# Patient Record
Sex: FEMALE | Race: WHITE | NOT HISPANIC OR LATINO | Employment: FULL TIME | URBAN - NONMETROPOLITAN AREA
[De-identification: names, ages, dates, MRNs, and addresses within clinical notes are randomized per-mention and may not be internally consistent; named-entity substitution may affect disease eponyms.]

---

## 2017-06-15 ENCOUNTER — TELEPHONE (OUTPATIENT)
Dept: FAMILY MEDICINE | Facility: OTHER | Age: 47
End: 2017-06-15

## 2017-06-15 DIAGNOSIS — F41.0 PANIC DISORDER WITHOUT AGORAPHOBIA: ICD-10-CM

## 2017-06-15 RX ORDER — PAROXETINE 10 MG/1
10 TABLET, FILM COATED ORAL DAILY
Qty: 30 TABLET | Refills: 0 | Status: SHIPPED | OUTPATIENT
Start: 2017-06-15 | End: 2017-07-14

## 2017-06-15 NOTE — TELEPHONE ENCOUNTER
Last office visit 7/15/15.  Last signed 12/5/16 #90, 1 R.  Future appointment 7/14/17.  PCP Hailey.  Please see notes below.  Medication pended for 30 days.  Thanks

## 2017-06-15 NOTE — TELEPHONE ENCOUNTER
Reason for call:  Medication    1. Medication Name? Paxil  2. Is this request for a refill? Yes  3. What Pharmacy do you use? [facility]  4. Have you contacted your pharmacy? Yes    5. If yes, when?  (Please note that the turn-around-time for prescriptions is 72 business hours; I am sending your request at this time. SEND TO  [facility]  )  Description: Patient was told she needed to be seen for a med review, but patient will be out of medication in 3 days. She would like if she could get med refilled until her appt on 07/14.  Was an appointment offered for this a call? Yes 07/14 first available  Preferred method for responding to this messageTelephone Call  If we cannot reach you directly, may we leave a detailed response at the number you provided? Yes  Can this message wait until your PCP/Provider returns if not available today? No

## 2017-06-22 DIAGNOSIS — Z12.31 VISIT FOR SCREENING MAMMOGRAM: Primary | ICD-10-CM

## 2017-07-14 ENCOUNTER — OFFICE VISIT (OUTPATIENT)
Dept: FAMILY MEDICINE | Facility: OTHER | Age: 47
End: 2017-07-14
Attending: FAMILY MEDICINE
Payer: COMMERCIAL

## 2017-07-14 VITALS
HEIGHT: 64 IN | WEIGHT: 113 LBS | SYSTOLIC BLOOD PRESSURE: 122 MMHG | BODY MASS INDEX: 19.29 KG/M2 | TEMPERATURE: 97.2 F | DIASTOLIC BLOOD PRESSURE: 66 MMHG | HEART RATE: 58 BPM

## 2017-07-14 DIAGNOSIS — Z91.018 NUT ALLERGY: Primary | ICD-10-CM

## 2017-07-14 DIAGNOSIS — F41.0 PANIC DISORDER WITHOUT AGORAPHOBIA: ICD-10-CM

## 2017-07-14 DIAGNOSIS — F34.1 DYSTHYMIA: ICD-10-CM

## 2017-07-14 PROCEDURE — 99213 OFFICE O/P EST LOW 20 MIN: CPT | Performed by: FAMILY MEDICINE

## 2017-07-14 RX ORDER — PAROXETINE 10 MG/1
10 TABLET, FILM COATED ORAL DAILY
Qty: 90 TABLET | Refills: 4 | Status: SHIPPED | OUTPATIENT
Start: 2017-07-14 | End: 2018-09-14

## 2017-07-14 RX ORDER — EPINEPHRINE 0.3 MG/.3ML
0.3 INJECTION SUBCUTANEOUS PRN
Qty: 0.6 ML | Refills: 3 | Status: SHIPPED | OUTPATIENT
Start: 2017-07-14 | End: 2019-06-24

## 2017-07-14 ASSESSMENT — ANXIETY QUESTIONNAIRES
1. FEELING NERVOUS, ANXIOUS, OR ON EDGE: NOT AT ALL
6. BECOMING EASILY ANNOYED OR IRRITABLE: NOT AT ALL
5. BEING SO RESTLESS THAT IT IS HARD TO SIT STILL: NOT AT ALL
7. FEELING AFRAID AS IF SOMETHING AWFUL MIGHT HAPPEN: NOT AT ALL
GAD7 TOTAL SCORE: 0
2. NOT BEING ABLE TO STOP OR CONTROL WORRYING: NOT AT ALL
4. TROUBLE RELAXING: NOT AT ALL
3. WORRYING TOO MUCH ABOUT DIFFERENT THINGS: NOT AT ALL

## 2017-07-14 ASSESSMENT — PAIN SCALES - GENERAL: PAINLEVEL: NO PAIN (0)

## 2017-07-14 NOTE — MR AVS SNAPSHOT
"              After Visit Summary   7/14/2017    Kacy Valdez    MRN: [id number]           Patient Information     Date Of Birth          1970        Visit Information        Provider Department      7/14/2017 8:45 AM Carlos Hart MD [facility]        Today's Diagnoses     Nut allergy    -  1    Panic disorder without agoraphobia        Dysthymia           Follow-ups after your visit        Your next 10 appointments already scheduled     Aug 23, 2017 10:30 AM CDT   (Arrive by 10:15 AM)   PHYSICAL with Ada Shirley MD   [facility] [place] ([facility] [place] )    [address]  [place] [address]   [phone]              Who to contact     If you have questions or need follow up information about today's clinic visit or your schedule please contact [facility] directly at [phone].  Normal or non-critical lab and imaging results will be communicated to you by MyChart, letter or phone within 4 business days after the clinic has received the results. If you do not hear from us within 7 days, please contact the clinic through MyChart or phone. If you have a critical or abnormal lab result, we will notify you by phone as soon as possible.  Submit refill requests through [employer] or call your pharmacy and they will forward the refill request to us. Please allow 3 business days for your refill to be completed.          Additional Information About Your Visit        MyChart Information     [employer] lets you send messages to your doctor, view your test results, renew your prescriptions, schedule appointments and more. To sign up, go to [email or web address] . Click on \"Log in\" on the left side of the screen, which will take you to the Welcome page. Then click on \"Sign up Now\" on the right side of the page.     You will be asked to enter the access code listed below, as well as some personal information. Please follow the directions to create your username " "and password.     Your access code is: [email or web address]  Expires: 10/12/2017  9:07 AM     Your access code will  in 90 days. If you need help or a new code, please call your [facility] or [phone].        Care EveryWhere ID     This is your Care EveryWhere ID. This could be used by other organizations to access your [place] medical records  [id number]        Your Vitals Were     Pulse Temperature Height BMI (Body Mass Index)          58 97.2  F (36.2  C) 5' 4\" (1.626 m) 19.4 kg/m2         Blood Pressure from Last 3 Encounters:   17 122/66   16 112/76   16 131/81    Weight from Last 3 Encounters:   17 113 lb (51.3 kg)   16 113 lb (51.3 kg)   09/29/15 114 lb (51.7 kg)              Today, you had the following     No orders found for display         Today's Medication Changes          These changes are accurate as of: 17  9:07 AM.  If you have any questions, ask your nurse or doctor.               These medicines have changed or have updated prescriptions.        Dose/Directions    EPINEPHrine 0.3 MG/0.3ML injection 2-pack   Commonly known as:  EPIPEN/ADRENACLICK/or ANY BX GENERIC EQUIV   This may have changed:    - medication strength  - how much to take  - how to take this  - when to take this  - reasons to take this   Used for:  Nut allergy   Changed by:  Carlos Hart MD        Dose:  0.3 mg   Inject 0.3 mLs (0.3 mg) into the muscle as needed for anaphylaxis   Quantity:  0.6 mL   Refills:  3            Where to get your medicines      These medications were sent to [id number] [address] [address]  [address] [address]     Phone:  [phone]     EPINEPHrine 0.3 MG/0.3ML injection 2-pack    PARoxetine 10 MG tablet                Primary Care Provider Office Phone # Fax #    Carlos Hart [phone]       [facility] [address] [facility]  [place] [address]        Equal Access to Services     RISHI BUTLER AH: " Hadii aad ku hadidaniao Solennieali, waaxda luqadaha, qaybta kaalmada kristen, marita roberson. So [facility] [phone].    ATENCIÓN: Si millie paredes, tiene a caceres disposición servicios gratuitos de asistencia lingüística. Llame al [phone].    We comply with applicable federal civil rights laws and [place] laws. We do not discriminate on the basis of race, color, national origin, age, disability sex, sexual orientation or gender identity.            Thank you!     Thank you for choosing [facility]  for your care. Our goal is always to provide you with excellent care. Hearing back from our patients is one way we can continue to improve our services. Please take a few minutes to complete the written survey that you may receive in the mail after your visit with us. Thank you!             Your Updated Medication List - Protect others around you: Learn how to safely use, store and throw away your medicines at www.disposemymeds.org.          This list is accurate as of: 7/14/17  9:07 AM.  Always use your most recent med list.                   Brand Name Dispense Instructions for use Diagnosis    EPINEPHrine 0.3 MG/0.3ML injection 2-pack    EPIPEN/ADRENACLICK/or ANY BX GENERIC EQUIV    0.6 mL    Inject 0.3 mLs (0.3 mg) into the muscle as needed for anaphylaxis    Nut allergy       MIRENA (52 MG) 20 MCG/24HR IUD   Generic drug:  levonorgestrel      1 each by Intrauterine route once. Every 5 years        nystatin-triamcinolone cream    MYCOLOG II    100 g    Apply small amt to area nightly    Vaginitis and vulvovaginitis, unspecified, Routine gynecological examination       PARoxetine 10 MG tablet    PAXIL    90 tablet    Take 1 tablet (10 mg) by mouth daily    Panic disorder without agoraphobia

## 2017-07-14 NOTE — PROGRESS NOTES
"  SUBJECTIVE:                                                    Kacy Valdez is a 47 year old female who presents to clinic today for the following health issues:      Depression Followup    Status since last visit: Stable     See PHQ-9 for current symptoms.  Other associated symptoms: None    Complicating factors:   Significant life event:  No   Current substance abuse:  None  Anxiety or Panic symptoms:  No    PHQ-9  English  PHQ-9   Any Language    Amount of exercise or physical activity: 6-7 days/week for an average of greater than 60 minutes    Problems taking medications regularly: No    Medication side effects: none    Diet: regular (no restrictions)    PHQ-9 SCORE 6/3/2015 8/3/2016 7/14/2017   Total Score 1 - -   Total Score - 0 0     REGINA-7 SCORE 8/3/2016 7/14/2017   Total Score 0 0           Problem list and histories reviewed & adjusted, as indicated.  Additional history: as documented    Labs reviewed in EPIC    Reviewed and updated as needed this visit by clinical staff  Tobacco  Allergies  Meds  Med Hx  Surg Hx  Fam Hx  Soc Hx      Reviewed and updated as needed this visit by Provider         ROS:  C: NEGATIVE for fever, chills, change in weight  R: NEGATIVE for significant cough or SOB  CV: NEGATIVE for chest pain, palpitations or peripheral edema    OBJECTIVE:                                                    /66  Pulse 58  Temp 97.2  F (36.2  C)  Ht 5' 4\" (1.626 m)  Wt 113 lb (51.3 kg)  BMI 19.4 kg/m2  Body mass index is 19.4 kg/(m^2).   GENERAL: healthy, alert, well nourished, well hydrated, no distress  NECK: no tenderness, no adenopathy, no asymmetry, no masses, no stiffness; thyroid- normal to palpation  RESP: lungs clear to auscultation - no rales, no rhonchi, no wheezes  CV: regular rates and rhythm, normal S1 S2, no S3 or S4 and no murmur, no click or rub -  PSYCH: Alert and oriented times 3; speech- coherent , normal rate and volume; able to articulate logical thoughts, able " to abstract reason, no tangential thoughts, no hallucinations or delusions, affect- normal         ASSESSMENT/PLAN:                                                    1. Panic disorder without agoraphobia  VERY stable - need probable low dose life time paxil since works very well for her. Will ref and see back in 12-15 months.   - PARoxetine (PAXIL) 10 MG tablet; Take 1 tablet (10 mg) by mouth daily  Dispense: 90 tablet; Refill: 3    Needs RF on epipen for nut allergy- done today       See Patient Instructions    Carlos Hart MD  [facility]

## 2017-07-14 NOTE — NURSING NOTE
"Chief Complaint   Patient presents with     Depression       Initial /66  Pulse 58  Temp 97.2  F (36.2  C)  Ht 5' 4\" (1.626 m)  Wt 113 lb (51.3 kg)  BMI 19.4 kg/m2 Estimated body mass index is 19.4 kg/(m^2) as calculated from the following:    Height as of this encounter: 5' 4\" (1.626 m).    Weight as of this encounter: 113 lb (51.3 kg).  Medication Reconciliation: complete     Shaw Fu      "

## 2017-07-15 ASSESSMENT — PATIENT HEALTH QUESTIONNAIRE - PHQ9: SUM OF ALL RESPONSES TO PHQ QUESTIONS 1-9: 0

## 2017-07-15 ASSESSMENT — ANXIETY QUESTIONNAIRES: GAD7 TOTAL SCORE: 0

## 2017-08-23 ENCOUNTER — OFFICE VISIT (OUTPATIENT)
Dept: OBGYN | Facility: OTHER | Age: 47
End: 2017-08-23
Attending: OBSTETRICS & GYNECOLOGY
Payer: COMMERCIAL

## 2017-08-23 VITALS
BODY MASS INDEX: 19.29 KG/M2 | HEART RATE: 60 BPM | WEIGHT: 113 LBS | DIASTOLIC BLOOD PRESSURE: 64 MMHG | SYSTOLIC BLOOD PRESSURE: 112 MMHG | HEIGHT: 64 IN

## 2017-08-23 DIAGNOSIS — Z30.09 FAMILY PLANNING: ICD-10-CM

## 2017-08-23 DIAGNOSIS — Z12.31 VISIT FOR SCREENING MAMMOGRAM: Primary | ICD-10-CM

## 2017-08-23 DIAGNOSIS — Z00.00 ROUTINE GENERAL MEDICAL EXAMINATION AT A HEALTH CARE FACILITY: Primary | ICD-10-CM

## 2017-08-23 PROBLEM — N88.2 STENOSIS, CERVIX: Status: ACTIVE | Noted: 2017-08-23

## 2017-08-23 PROCEDURE — 87624 HPV HI-RISK TYP POOLED RSLT: CPT | Mod: 90 | Performed by: OBSTETRICS & GYNECOLOGY

## 2017-08-23 PROCEDURE — 99000 SPECIMEN HANDLING OFFICE-LAB: CPT | Performed by: OBSTETRICS & GYNECOLOGY

## 2017-08-23 PROCEDURE — 88142 CYTOPATH C/V THIN LAYER: CPT | Performed by: OBSTETRICS & GYNECOLOGY

## 2017-08-23 PROCEDURE — 99396 PREV VISIT EST AGE 40-64: CPT | Mod: 25 | Performed by: OBSTETRICS & GYNECOLOGY

## 2017-08-23 PROCEDURE — 77063 BREAST TOMOSYNTHESIS BI: CPT | Mod: TC | Performed by: RADIOLOGY

## 2017-08-23 PROCEDURE — G0202 SCR MAMMO BI INCL CAD: HCPCS | Mod: TC | Performed by: RADIOLOGY

## 2017-08-23 PROCEDURE — 58300 INSERT INTRAUTERINE DEVICE: CPT | Performed by: OBSTETRICS & GYNECOLOGY

## 2017-08-23 PROCEDURE — 76830 TRANSVAGINAL US NON-OB: CPT | Performed by: OBSTETRICS & GYNECOLOGY

## 2017-08-23 ASSESSMENT — ANXIETY QUESTIONNAIRES
7. FEELING AFRAID AS IF SOMETHING AWFUL MIGHT HAPPEN: NOT AT ALL
GAD7 TOTAL SCORE: 0
IF YOU CHECKED OFF ANY PROBLEMS ON THIS QUESTIONNAIRE, HOW DIFFICULT HAVE THESE PROBLEMS MADE IT FOR YOU TO DO YOUR WORK, TAKE CARE OF THINGS AT HOME, OR GET ALONG WITH OTHER PEOPLE: NOT DIFFICULT AT ALL
6. BECOMING EASILY ANNOYED OR IRRITABLE: NOT AT ALL
5. BEING SO RESTLESS THAT IT IS HARD TO SIT STILL: NOT AT ALL
1. FEELING NERVOUS, ANXIOUS, OR ON EDGE: NOT AT ALL
3. WORRYING TOO MUCH ABOUT DIFFERENT THINGS: NOT AT ALL
2. NOT BEING ABLE TO STOP OR CONTROL WORRYING: NOT AT ALL

## 2017-08-23 ASSESSMENT — PATIENT HEALTH QUESTIONNAIRE - PHQ9
5. POOR APPETITE OR OVEREATING: NOT AT ALL
SUM OF ALL RESPONSES TO PHQ QUESTIONS 1-9: 0

## 2017-08-23 NOTE — NURSING NOTE
"Chief Complaint   Patient presents with     Gyn Exam       Initial /64  Pulse 60  Ht 5' 4\" (1.626 m)  Wt 113 lb (51.3 kg)  BMI 19.4 kg/m2 Estimated body mass index is 19.4 kg/(m^2) as calculated from the following:    Height as of this encounter: 5' 4\" (1.626 m).    Weight as of this encounter: 113 lb (51.3 kg).  Medication Reconciliation: complete     WILLY YIP      "

## 2017-08-23 NOTE — LETTER
August 31, 2017      Kacy METCALF Courtney  [address]   [place] [address]        Dear Kacy,       Thank you for coming to the [facility]. This letter is to inform you that your pap test was normal and your High Risk HPV test was negative. Please call the nurse at [phone] if you have questions pertaining to your results.          Sincerely,        Ada Shirley MD/Adia VALLE LPN

## 2017-08-23 NOTE — MR AVS SNAPSHOT
"              After Visit Summary   8/23/2017    Kacy Valdez    MRN: [id number]           Patient Information     Date Of Birth          1970        Visit Information        Provider Department      8/23/2017 10:30 AM Ada Shirley MD [facility] [place]        Today's Diagnoses     Routine general medical examination at a health care facility    -  1    Family planning          Care Instructions    Return in 3 weeks   Return in 1 year           Follow-ups after your visit        Your next 10 appointments already scheduled     Aug 23, 2017 10:30 AM CDT   (Arrive by 10:15 AM)   PHYSICAL with Ada Shirley MD   [facility] ([facility] )    [address] [place] [address]   [phone]              Who to contact     If you have questions or need follow up information about today's clinic visit or your schedule please contact [facility] directly at [phone].  Normal or non-critical lab and imaging results will be communicated to you by MyChart, letter or phone within 4 business days after the clinic has received the results. If you do not hear from us within 7 days, please contact the clinic through MyChart or phone. If you have a critical or abnormal lab result, we will notify you by phone as soon as possible.  Submit refill requests through [employer] or call your pharmacy and they will forward the refill request to us. Please allow 3 business days for your refill to be completed.          Additional Information About Your Visit        MyChart Information     [employer] lets you send messages to your doctor, view your test results, renew your prescriptions, schedule appointments and more. To sign up, go to [email or web address] . Click on \"Log in\" on the left side of the screen, which will take you to the Welcome page. Then click on \"Sign up Now\" on the right side of the page.     You will be asked to enter the access code listed below, as well " "as some personal information. Please follow the directions to create your username and password.     Your access code is: [email or web address]  Expires: 10/12/2017  9:07 AM     Your access code will  in 90 days. If you need help or a new code, please call your [place] clinic or [phone].        Care EveryWhere ID     This is your Care EveryWhere ID. This could be used by other organizations to access your [place] medical records  [id number]        Your Vitals Were     Pulse Height BMI (Body Mass Index)             60 5' 4\" (1.626 m) 19.4 kg/m2          Blood Pressure from Last 3 Encounters:   17 112/64   17 122/66   16 112/76    Weight from Last 3 Encounters:   17 113 lb (51.3 kg)   17 113 lb (51.3 kg)   16 113 lb (51.3 kg)              We Performed the Following     INSERTION INTRAUTERINE DEVICE     REMOVE INTRAUTERINE DEVICE          Today's Medication Changes          These changes are accurate as of: 17 10:28 AM.  If you have any questions, ask your nurse or doctor.               These medicines have changed or have updated prescriptions.        Dose/Directions    * MIRENA (52 MG) 20 MCG/24HR IUD   This may have changed:  Another medication with the same name was added. Make sure you understand how and when to take each.   Generic drug:  levonorgestrel   Changed by:  Ada Shirley MD        Dose:  1 each   1 each by Intrauterine route once. Every 5 years   Refills:  0       * levonorgestrel 20 MCG/24HR IUD   Commonly known as:  MIRENA   This may have changed:  You were already taking a medication with the same name, and this prescription was added. Make sure you understand how and when to take each.   Used for:  Family planning   Changed by:  Ada Shirley MD        Dose:  1 each   1 each (20 mcg) by Intrauterine route continuous   Refills:  0       * Notice:  This list has 2 medication(s) that are the same as other medications prescribed for you. Read the " directions carefully, and ask your doctor or other care provider to review them with you.         Where to get your medicines      Some of these will need a paper prescription and others can be bought over the counter.  Ask your nurse if you have questions.     You don't need a prescription for these medications     levonorgestrel 20 MCG/24HR IUD                Primary Care Provider Office Phone # Fax #    Carlos Hart [phone]       [facility] [address] [facility] [address]        Equal Access to Services     [facility] : Hadii aad ku hadasho Soomaali, waaxda luqadaha, qaybta kaalmada adeegyada, waxay idiin hayaan steve haynes . So [facility] [phone].    ATENCIÓN: Si habla español, tiene a caceres disposición servicios gratuitos de asistencia lingüística. [facility] [phone].    We comply with applicable federal civil rights laws and [place] laws. We do not discriminate on the basis of race, color, national origin, age, disability sex, sexual orientation or gender identity.            Thank you!     Thank you for choosing [facility]  for your care. Our goal is always to provide you with excellent care. Hearing back from our patients is one way we can continue to improve our services. Please take a few minutes to complete the written survey that you may receive in the mail after your visit with us. Thank you!             Your Updated Medication List - Protect others around you: Learn how to safely use, store and throw away your medicines at www.disposemymeds.org.          This list is accurate as of: 8/23/17 10:28 AM.  Always use your most recent med list.                   Brand Name Dispense Instructions for use Diagnosis    EPINEPHrine 0.3 MG/0.3ML injection 2-pack    EPIPEN/ADRENACLICK/or ANY BX GENERIC EQUIV    0.6 mL    Inject 0.3 mLs (0.3 mg) into the muscle as needed for anaphylaxis    Nut allergy       * MIRENA (52 MG) 20 MCG/24HR IUD   Generic  drug:  levonorgestrel      1 each by Intrauterine route once. Every 5 years        * levonorgestrel 20 MCG/24HR IUD    MIRENA     1 each (20 mcg) by Intrauterine route continuous    Family planning       nystatin-triamcinolone cream    MYCOLOG II    100 g    Apply small amt to area nightly    Vaginitis and vulvovaginitis, unspecified, Routine gynecological examination       PARoxetine 10 MG tablet    PAXIL    90 tablet    Take 1 tablet (10 mg) by mouth daily    Panic disorder without agoraphobia       * Notice:  This list has 2 medication(s) that are the same as other medications prescribed for you. Read the directions carefully, and ask your doctor or other care provider to review them with you.

## 2017-08-23 NOTE — PROGRESS NOTES
ANNUAL    Kacy is a 47 year old  female who presents for annual exam.   yc 12  LC 9.5  Gdm n  hpt n  No LMP recorded. Patient is not currently having periods (Reason: IUD).  Menses: n pain n Contraception Mirena.  Planning pregnancy: n  Concerns in addition to routine health maintenance?  n.  GYNECOLOGIC HISTORY:   Surgery: n  History sexually transmitted infections:No STD history HPV  Safe?  y  STI testing offered?  y  History of abnormal Pap smear: y  Problems with sex? n  Family history of: breast CA: pgm and s   Uterine n ovarian CA: n   colon CA: n    HEALTH MAINTENANCE:  Cholesterol: (  Cholesterol   Date Value Ref Range Status   2015 153 <200 mg/dL Final     Comment:     LDL Cholesterol is the primary guide to therapy.   The NCEP recommends further evaluation of: patients with cholesterol greater   than 200 mg/dL if additional risk factors are present, cholesterol greater   than   240 mg/dL, triglycerides greater than 150 mg/dL, or HDL less than 40 mg/dL.     2014 147 <200 mg/dL Final     Comment:     LDL Cholesterol is the primary guide to therapy.   The NCEP recommends further evaluation of: patients with cholesterol greater   than 200 mg/dL if additional risk factors are present, cholesterol greater   than   240 mg/dL, triglycerides greater than 150 mg/dL, or HDL less than 40 mg/dL.      History of abnormal lipids:   Mammo: done . History of abnormal Mammo:y Provatas follows  Regular Self Breast Exams: y Calcium/Vitamin D or Vit: y Exercise y    TSH: (  TSH   Date Value Ref Range Status   2015 2.90 0.40 - 4.00 mU/L Final    )  Pap; (  Lab Results   Component Value Date    PAP NIL 2016    PAP NIL 2015    PAP OTHER-NIL, See Result 2014    )    HISTORY:  Obstetric History     No data available        Past Medical History:   Diagnosis Date     Carcinoma in situ of cervix uteri 2009     Dysthymic disorder 2001     Other acne 1/10/2002     Panic disorder without  agoraphobia 1/3/2011     Papanicolaou smear of cervix with high grade squamous intraepithelial lesion (HGSIL) 4/27/2009     Unspecified sinusitis (chronic) 6/5/2003     Past Surgical History:   Procedure Laterality Date     ------------OTHER-------------  2009    LEEP     ------------OTHER-------------  2005    IUD     BIOPSY BREAST      breast lump     BUNIONECTOMY  2010     Family History   Problem Relation Age of Onset     CANCER Sister 48     breast     Breast Cancer Paternal Grandmother 71     Social History     Social History     Marital status:      Spouse name: N/A     Number of children: N/A     Years of education: N/A     Social History Main Topics     Smoking status: Never Smoker     Smokeless tobacco: Never Used     Alcohol use No     Drug use: No     Sexual activity: Yes     Partners: Male     Birth control/ protection: IUD     Other Topics Concern     Parent/Sibling W/ Cabg, Mi Or Angioplasty Before 65f 55m? No     Social History Narrative       Current Outpatient Prescriptions:      EPINEPHrine (EPIPEN/ADRENACLICK/OR ANY BX GENERIC EQUIV) 0.3 MG/0.3ML injection 2-pack, Inject 0.3 mLs (0.3 mg) into the muscle as needed for anaphylaxis, Disp: 0.6 mL, Rfl: 3     nystatin-triamcinolone (MYCOLOG II) cream, Apply small amt to area nightly, Disp: 100 g, Rfl: 6     levonorgestrel (MIRENA) 20 MCG/24HR IUD, 1 each by Intrauterine route once. Every 5 years, Disp: , Rfl:      PARoxetine (PAXIL) 10 MG tablet, Take 1 tablet (10 mg) by mouth daily, Disp: 90 tablet, Rfl: 4     Allergies   Allergen Reactions     Nuts Other (See Comments)     Nut flavor - throat tightens       Past medical, surgical, social and family history were reviewed and updated in EPIC.    ROS:    C:     NEGATIVE for fever, chills, change in weight  I:       NEGATIVE for worrisome rashes, moles or lesions  E:     NEGATIVE for vision changes or irritation  E/M: NEGATIVE for ear, mouth and throat problems  R:     NEGATIVE for significant  "cough or SOB  CV:   NEGATIVE for chest pain, palpitations or peripheral edema  GI:     NEGATIVE for nausea, abdominal pain, heartburn, or change in bowel habits  :   NEGATIVE for frequency, dysuria, hematuria, vaginal discharge, or irregular bleeding,incontinence   M:     NEGATIVE for significant arthralgias or myalgia  N:      NEGATIVE for weakness, dizziness or paresthesias  E:      NEGATIVE for temperature intolerance, skin/hair changes  P:      NEGATIVE for changes in mood or affect.     EXAM:   /64  Pulse 60  Ht 5' 4\" (1.626 m)  Wt 113 lb (51.3 kg)  BMI 19.4 kg/m2   BMI: Body mass index is 19.4 kg/(m^2).  Constitutional: healthy, alert and no distress  Head: Normocephalic. No masses, lesions, tenderness or abnormalities  Neck: Neck supple. Trachea midline. No adenopathy. Thyroid symmetric, normal size.   Cardiovascular: RRR.   Respiratory: lungs clear   Breast: Breasts reveal mild symmetric fibrocystic densities, but there are no dominant, discrete, fixed or suspicious masses found.  Gastrointestinal: Abdomen soft, non-tender, non-distended. No masses, organomegaly.  :  Vulva:  No external lesions, normal female hair distribution, no inguinal adenopathy.    Urethra:  Midline, non-tender, well supported, no discharge  Vagina:  Moist, pink, no abnormal discharge, no lesions  Cervix: clean string in place  Procedure:  After verbal consent was obtained from the patient, IUD strings were grasped with ring forcep and IUD easily removed intact with minimal patient discomfort noted.  No bleeding noted.  Mirena placed in usual manner without comps but necessitating cervical dilation  Uterus:   Normal size,  , non-tender, freely mobile  Ovaries:  No masses appreciated  Rectal Exam: not done  Musculoskeletal: extremities normal  Skin: no suspicious lesions or rashes  Psychiatric: Affect appropriate, cooperative,mentation appears normal.     COUNSELING:   regular exercise  healthy " diet/nutrition  Immunizations   contraception  family planning  Folic Acid Counseling     reports that she has never smoked. She has never used smokeless tobacco.  Tobacco Cessation Action Plan: na  Body mass index is 19.4 kg/(m^2).  Weight management plan: na  FRAX Risk Assessment    ASSESSMENT:  47 year old female with satisfactory annual exam  Family planning,stenotic ceervix  PLAN:   Pap with HR hpv with cytobrush  Mirena removed and replaced in through stenotic cx  TVusg show IUD in place  rto 3 wks  Tsh,fasting chol with lipid profile,fasting blood sugar,hp,hga1c,next year  mammogram done  Needs MRI next year or whenever Provatas says  CheckMIIC  rto 1 year     Greater than 0  minutes spent discussing other than annual     Ada Shirley MD  Addendum 1639  Call to patient by me reveals light bleeding. Cramping is gone with Ibuprofen

## 2017-08-24 ASSESSMENT — ANXIETY QUESTIONNAIRES: GAD7 TOTAL SCORE: 0

## 2017-08-30 LAB
COPATH REPORT: NORMAL
PAP: NORMAL

## 2017-08-31 LAB
FINAL DIAGNOSIS: NORMAL
HPV HR 12 DNA CVX QL NAA+PROBE: NEGATIVE
HPV16 DNA SPEC QL NAA+PROBE: NEGATIVE
HPV18 DNA SPEC QL NAA+PROBE: NEGATIVE
SPECIMEN DESCRIPTION: NORMAL

## 2017-09-13 ENCOUNTER — OFFICE VISIT (OUTPATIENT)
Dept: OBGYN | Facility: OTHER | Age: 47
End: 2017-09-13
Attending: OBSTETRICS & GYNECOLOGY
Payer: COMMERCIAL

## 2017-09-13 VITALS
BODY MASS INDEX: 19.29 KG/M2 | DIASTOLIC BLOOD PRESSURE: 70 MMHG | HEIGHT: 64 IN | SYSTOLIC BLOOD PRESSURE: 112 MMHG | HEART RATE: 60 BPM | WEIGHT: 113 LBS

## 2017-09-13 DIAGNOSIS — Z30.09 FAMILY PLANNING: Primary | ICD-10-CM

## 2017-09-13 PROCEDURE — 99212 OFFICE O/P EST SF 10 MIN: CPT | Performed by: OBSTETRICS & GYNECOLOGY

## 2017-09-13 NOTE — NURSING NOTE
"Chief Complaint   Patient presents with     IUD       Initial /70  Pulse 60  Ht 5' 4\" (1.626 m)  Wt 113 lb (51.3 kg)  BMI 19.4 kg/m2 Estimated body mass index is 19.4 kg/(m^2) as calculated from the following:    Height as of this encounter: 5' 4\" (1.626 m).    Weight as of this encounter: 113 lb (51.3 kg).  Medication Reconciliation: sg Cancino      "

## 2017-09-13 NOTE — MR AVS SNAPSHOT
"              After Visit Summary   2017    Kacy Valdez    MRN: [id number]           Patient Information     Date Of Birth          1970        Visit Information        Provider Department      2017 8:30 AM Ada Shirley MD [facility]        Today's Diagnoses     Family planning    -  1      Care Instructions    Return for annual exam.            Follow-ups after your visit        Who to contact     If you have questions or need follow up information about today's clinic visit or your schedule please contact [facility] directly at [phone].  Normal or non-critical lab and imaging results will be communicated to you by Yolah[employer], letter or phone within 4 business days after the clinic has received the results. If you do not hear from us within 7 days, please contact the clinic through Yolahart or phone. If you have a critical or abnormal lab result, we will notify you by phone as soon as possible.  Submit refill requests through [employer] or call your pharmacy and they will forward the refill request to us. Please allow 3 business days for your refill to be completed.          Additional Information About Your Visit        MyChart Information     [employer] lets you send messages to your doctor, view your test results, renew your prescriptions, schedule appointments and more. To sign up, go to [email or web address] . Click on \"Log in\" on the left side of the screen, which will take you to the Welcome page. Then click on \"Sign up Now\" on the right side of the page.     You will be asked to enter the access code listed below, as well as some personal information. Please follow the directions to create your username and password.     Your access code is: [email or web address]  Expires: 10/12/2017  9:07 AM     Your access code will  in 90 days. If you need help or a new code, please call your [facility] or [phone].        Care EveryWhere ID     This is your Care " "EveryWhere ID. This could be used by other organizations to access your [place] medical records  [id number]        Your Vitals Were     Pulse Height BMI (Body Mass Index)             60 5' 4\" (1.626 m) 19.4 kg/m2          Blood Pressure from Last 3 Encounters:   09/13/17 112/70   08/23/17 112/64   07/14/17 122/66    Weight from Last 3 Encounters:   09/13/17 113 lb (51.3 kg)   08/23/17 113 lb (51.3 kg)   07/14/17 113 lb (51.3 kg)              Today, you had the following     No orders found for display       Primary Care Provider Office Phone # Fax #    Carlos Hart [phone]       [facility] [address]  [facility] [address]        Equal Access to Services     RISHI BUTLER : Hadii jair florence hadasho Soomaali, waaxda luqadaha, qaybta kaalmada adeegyada, marita haynes . So [facility] [phone].    ATENCIÓN: Si habla español, tiene a caceres disposición servicios gratuitos de asistencia lingüística. Yamile al [phone].    We comply with applicable federal civil rights laws and [place] laws. We do not discriminate on the basis of race, color, national origin, age, disability sex, sexual orientation or gender identity.            Thank you!     Thank you for choosing [facility]  for your care. Our goal is always to provide you with excellent care. Hearing back from our patients is one way we can continue to improve our services. Please take a few minutes to complete the written survey that you may receive in the mail after your visit with us. Thank you!             Your Updated Medication List - Protect others around you: Learn how to safely use, store and throw away your medicines at www.disposemymeds.org.          This list is accurate as of: 9/13/17  9:43 AM.  Always use your most recent med list.                   Brand Name Dispense Instructions for use Diagnosis    EPINEPHrine 0.3 MG/0.3ML injection 2-pack    EPIPEN/ADRENACLICK/or ANY BX " GENERIC EQUIV    0.6 mL    Inject 0.3 mLs (0.3 mg) into the muscle as needed for anaphylaxis    Nut allergy       levonorgestrel 20 MCG/24HR IUD    MIRENA     1 each (20 mcg) by Intrauterine route continuous    Family planning       nystatin-triamcinolone cream    MYCOLOG II    100 g    Apply small amt to area nightly    Vaginitis and vulvovaginitis, unspecified, Routine gynecological examination       PARoxetine 10 MG tablet    PAXIL    90 tablet    Take 1 tablet (10 mg) by mouth daily    Panic disorder without agoraphobia

## 2017-09-13 NOTE — PROGRESS NOTES
"Kacy Valdez is a 47 year old female with IUD in for 3 wks. No pain. No bleeding. Sex ok.no string      O:   /70  Pulse 60  Ht 5' 4\" (1.626 m)  Wt 113 lb (51.3 kg)  BMI 19.4 kg/m2   String in place    A:  Family planning    P:  rto annual    Greater than 15 minutes were spent face to face counseling this patient    Ada Shirley MD    "

## 2018-09-11 NOTE — PATIENT INSTRUCTIONS
Preventive Health Recommendations  Female Ages 50 - 64    Yearly exam: See your health care provider every year in order to  o Review health changes.   o Discuss preventive care.    o Review your medicines if your doctor has prescribed any.      Get a Pap test every three years (unless you have an abnormal result and your provider advises testing more often).    If you get Pap tests with HPV test, you only need to test every 5 years, unless you have an abnormal result.     You do not need a Pap test if your uterus was removed (hysterectomy) and you have not had cancer.    You should be tested each year for STDs (sexually transmitted diseases) if you're at risk.     Have a mammogram every 1 to 2 years.    Have a colonoscopy at age 50, or have a yearly FIT test (stool test). These exams screen for colon cancer.      Have a cholesterol test every 5 years, or more often if advised.    Have a diabetes test (fasting glucose) every three years. If you are at risk for diabetes, you should have this test more often.     If you are at risk for osteoporosis (brittle bone disease), think about having a bone density scan (DEXA).    Shots: Get a flu shot each year. Get a tetanus shot every 10 years.    Nutrition:     Eat at least 5 servings of fruits and vegetables each day.    Eat whole-grain bread, whole-wheat pasta and brown rice instead of white grains and rice.    Get adequate Calcium and Vitamin D.     Lifestyle    Exercise at least 150 minutes a week (30 minutes a day, 5 days a week). This will help you control your weight and prevent disease.    Limit alcohol to one drink per day.    No smoking.     Wear sunscreen to prevent skin cancer.     See your dentist every six months for an exam and cleaning.    See your eye doctor every 1 to 2 years.  Results for orders placed or performed in visit on 09/14/18 (from the past 24 hour(s))   Comprehensive metabolic panel   Result Value Ref Range    Sodium 140 133 - 144 mmol/L     Potassium 3.6 3.4 - 5.3 mmol/L    Chloride 105 94 - 109 mmol/L    Carbon Dioxide 29 20 - 32 mmol/L    Anion Gap 6 3 - 14 mmol/L    Glucose 79 70 - 99 mg/dL    Urea Nitrogen 12 7 - 30 mg/dL    Creatinine 0.85 0.52 - 1.04 mg/dL    GFR Estimate 71 >60 mL/min/1.7m2    GFR Estimate If Black 85 >60 mL/min/1.7m2    Calcium 9.5 8.5 - 10.1 mg/dL    Bilirubin Total 0.5 0.2 - 1.3 mg/dL    Albumin 4.4 3.4 - 5.0 g/dL    Protein Total 8.0 6.8 - 8.8 g/dL    Alkaline Phosphatase 83 40 - 150 U/L    ALT 20 0 - 50 U/L    AST 24 0 - 45 U/L   CBC with platelets and differential   Result Value Ref Range    WBC 4.5 4.0 - 11.0 10e9/L    RBC Count 4.74 3.8 - 5.2 10e12/L    Hemoglobin 14.9 11.7 - 15.7 g/dL    Hematocrit 43.2 35.0 - 47.0 %    MCV 91 78 - 100 fl    MCH 31.4 26.5 - 33.0 pg    MCHC 34.5 31.5 - 36.5 g/dL    RDW 11.9 10.0 - 15.0 %    Platelet Count 238 150 - 450 10e9/L    Diff Method Automated Method     % Neutrophils 57.9 %    % Lymphocytes 27.8 %    % Monocytes 9.9 %    % Eosinophils 3.5 %    % Basophils 0.7 %    % Immature Granulocytes 0.2 %    Nucleated RBCs 0 0 /100    Absolute Neutrophil 2.6 1.6 - 8.3 10e9/L    Absolute Lymphocytes 1.3 0.8 - 5.3 10e9/L    Absolute Monocytes 0.5 0.0 - 1.3 10e9/L    Absolute Eosinophils 0.2 0.0 - 0.7 10e9/L    Absolute Basophils 0.0 0.0 - 0.2 10e9/L    Abs Immature Granulocytes 0.0 0 - 0.4 10e9/L    Absolute Nucleated RBC 0.0      Colonoscopy  What You Should Know  Please arrive with an adult who can drive you home after the exam and stay with you for the next six hours. The medicines used in the exam will make you sleepy. You will not be able to drive. You cannot take a bus or taxi by yourself.  What is a colonoscopy?  A colonoscopy lets the doctor look inside your large intestine (colon). The doctor guides a scope, or small camera, through the entire colon. The scope is attached to a long, flexible tube. The image from the scope appears on a large TV screen.   The scope will send air into  your colon. This   opens the colon so the doctor can see it better on the screen.  The exam looks for defects such as inflamed tissue, polyps, ulcers (sores), diverticula (pouches in the wall of the colon) and early signs of cancer.  How do I prepare for the exam?  Read your guidelines for cleansing the colon. It is highly important that you follow the directions closely. If your colon is empty and clean, your exam will be more thorough and take less time.   Be sure to tell your doctor or nurse if you have ever had eye, lung or heart disease (including endocarditis or an artificial valve); diabetes; hepatitis; or any allergies to medicines.  Plan to arrive on time for your exam.    Dress in comfortable, loose clothing.    Bring your insurance card. Leave your purse, billfold, credit cards and other valuables at home.    Bring a list of your medicines and known allergies. If you have a pacemaker or ICD, please bring your information card.    We do our best to stay on time, but there may be a delay. Please bring something to pass the time, such as a newspaper or book.  What happens when I arrive?    You will fill out some paperwork, then we will take you to a private area. A nurse will check your records and ask you questions.    You will change into a hospital gown. We may ask you to remove any jewelry.    We will review the risks and benefits of the exam. You will need to sign a consent form.    We will insert an IV (thin tube) into a vein   in your hand or arm. We will use this to give   you medicine.  What happens during the exam?  The exam takes from 15 minutes to one hour.   You may ask questions of the doctor.    You will lie on your left side on a table.    You will receive medicines to relieve pain and help you relax.    The doctor will insert the scope into your rectum (bottom). The scope is on a long, thin tube.   The doctor will slowly guide the scope into your colon. The tube bends, so it can move through  the curves of your colon.    We may ask you to change positions to help the doctor move the scope.  If we see any polyps (growths), we will remove them. We do this because polyps can cause bleeding or turn into cancer. For some polyps, we will take tissue (a biopsy) to test in the lab. Most polyps turn out to be benign (not cancer).   Will it hurt?  You should feel little or no discomfort. The air will make you will feel full, and you will notice some pressure as the tube passes through your colon. Tell your doctor or nurse if you feel any pain. If you have cramps, breathe slowly to help your body relax.   What happens after the exam?    We will take you to the recovery area. We will watch you for up to one hour or until most of the medicine has worn off.    We will remove the IV. You will receive a report about your exam.    You may feel bloated or crampy for a short time because of the air that was injected. You will need to be passing air before you go home.    Your first meal should be light. Slowly return to normal meals, unless your doctor gives you other orders. Take it easy the rest of the day.    If you had a polyp removed:  ? Avoid heavy exercise for one week (no heavy lifting, sports or other activity).  ? Avoid nuts and popcorn for one week.  ? You may have bleeding for several days after your exam. Call your doctor if bleeding is heavy or you have black or bloody stools (bowel movements).  ? If you normally take aspirin or blood thinners, ask your doctor when you can start taking them again.    You may receive more than one bill for your exam. (Separate charges may come from the clinic, doctor, lab, etc.).  What if I need to change my appointment?  If you cannot keep your appointment, please call us as soon as you can. Our center is very busy, and we will need to contact the patient who comes after you.     [facility]: [phone]    [facility]:  [phone]    [facility]: [phone]    [facility]:   [phone]    [facility]: Call your clinic    [facility]: Call your clinic    [facility]: [phone]    [facility]: [phone]    Your clinic: ______________________________  For informational purposes only. Not to replace the advice of your health care provider.   Copyright   2007 [facility]. All rights reserved. Developed in   collaboration with [facility]. [email or web address] [id number] - REV 3/18    Colonoscopy     A camera attached to a flexible tube with a viewing lens is used to take video pictures.     Colonoscopy is a test to view the inside of your lower digestive tract (colon and rectum). Sometimes it can show the last part of the small intestine (ileum). During the test, small pieces of tissue may be removed for testing. This is called a biopsy. Small growths, such as polyps, may also be removed.   Why is colonoscopy done?  The test is done to help look for colon cancer. And it can help find the source of abdominal pain, bleeding, and changes in bowel habits. It may be needed once a year, depending on factors such as your:    Age    Health history    Family health history    Symptoms    Results from any prior colonoscopy  Risks and possible complications  These include:    Bleeding                 A puncture or tear in the colon     Risks of anesthesia    A cancer lesion not being seen  Getting ready   To prepare for the test:    Talk with your healthcare provider about the risks of the test (see below). Also ask your healthcare provider about alternatives to the test.    Tell your healthcare provider about any medicines you take. Also tell him or her about any health conditions you may have.    Make sure your rectum and colon are empty for the test. Follow the diet and bowel prep  instructions exactly. If you don t, the test may need to be rescheduled.    Plan for a friend or family member to drive you home after the test.     Colonoscopy provides an inside view of the entire colon.     You may discuss the results with your doctor right away or at a future visit.  During the test   The test is usually done in the hospital on an outpatient basis. This means you go home the same day. The procedure takes about 30 minutes. During that time:    You are given relaxing (sedating) medicine through an IV line. You may be drowsy, or fully asleep.    The healthcare provider will first give you a physical exam to check for anal and rectal problems.    Then the anus is lubricated and the scope inserted.    If you are awake, you may have a feeling similar to needing to have a bowel movement. You may also feel pressure as air is pumped into the colon. It s OK to pass gas during the procedure.    Biopsy, polyp removal, or other treatments may be done during the test.  After the test   You may have gas right after the test. It can help to try to pass it to help prevent later bloating. Your healthcare provider may discuss the results with you right away. Or you may need to schedule a follow-up visit to talk about the results. After the test, you can go back to your normal eating and other activities. You may be tired from the sedation and need to rest for a few hours.  Date Last Reviewed: 11/1/2016 2000-2017 [employer]. [address] [place] [address], [place] [address]. All rights reserved. This information is not intended as a substitute for professional medical care. Always follow your healthcare professional's instructions.

## 2018-09-11 NOTE — PROGRESS NOTES
SUBJECTIVE:   CC: Kacy Valdez is an 48 year old woman who presents for preventive health visit.     Healthy Habits:    Do you get at least three servings of calcium containing foods daily (dairy, green leafy vegetables, etc.)? yes    Amount of exercise or daily activities, outside of work: 6 day(s) per week    Problems taking medications regularly No    Medication side effects: No    Have you had an eye exam in the past two years? no    Do you see a dentist twice per year? yes    Do you have sleep apnea, excessive snoring or daytime drowsiness?no      Depression and Anxiety Follow-Up    Status since last visit: No change    Other associated symptoms:None    Complicating factors:     Significant life event: No     Current substance abuse: None    PHQ-9 7/14/2017 8/23/2017 9/14/2018   Total Score 0 0 0   Q9: Suicide Ideation Not at all Not at all Not at all     REGINA-7 SCORE 7/14/2017 8/23/2017 9/14/2018   Total Score 0 0 0       PHQ-9  English  PHQ-9   Any Language  REGINA-7  Suicide Assessment Five-step Evaluation and Treatment (SAFE-T)    Today's PHQ-2 Score:   PHQ-2 ( 1999 [employer]) 9/29/2015 5/8/2013   Q1: Little interest or pleasure in doing things 0 0   Q2: Feeling down, depressed or hopeless 0 0   PHQ-2 Score 0 0       Abuse: Current or Past(Physical, Sexual or Emotional)- No  Do you feel safe in your environment - Yes    Social History   Substance Use Topics     Smoking status: Never Smoker     Smokeless tobacco: Never Used     Alcohol use No     If you drink alcohol do you typically have >3 drinks per day or >7 drinks per week? No                     Reviewed orders with patient.  Reviewed health maintenance and updated orders accordingly - Yes  Labs reviewed in [facility]    Patient over age 50, mutual decision to screen reflected in health maintenance.    Pertinent mammograms are reviewed under the imaging tab.  History of abnormal Pap smear: YES - ALEJANDRO 2/3 on biopsy - PAP/HPV co-testing at 12, 24 months.  If two  "negative results repeat co-testing in 3 years, if negative then routine screening.  PAP / HPV Latest Ref Rng & Units 8/23/2017 8/3/2016 6/3/2015   PAP - NIL NIL NIL   HPV 16 DNA NEG:Negative Negative Negative Negative   HPV 18 DNA NEG:Negative Negative Negative Negative   OTHER HR HPV NEG:Negative Negative Negative Positive(A)   HPV HIGH RISK DNA PROBE - - - -     Reviewed and updated as needed this visit by clinical staff         Reviewed and updated as needed this visit by Provider        Past Medical History:   Diagnosis Date     Carcinoma in situ of cervix uteri 5/19/2009     Dysthymic disorder 8/8/2001     Other acne 1/10/2002     Panic disorder without agoraphobia 1/3/2011     Papanicolaou smear of cervix with high grade squamous intraepithelial lesion (HGSIL) 4/27/2009     Unspecified sinusitis (chronic) 6/5/2003      Past Surgical History:   Procedure Laterality Date     ------------OTHER-------------  2009    LEEP     ------------OTHER-------------  2005    IUD     BIOPSY BREAST      breast lump     BUNIONECTOMY  2010       ROS:  CONSTITUTIONAL: NEGATIVE for fever, chills, change in weight  INTEGUMENTARU/SKIN: NEGATIVE for worrisome rashes, moles or lesions  EYES: NEGATIVE for vision changes or irritation  ENT: NEGATIVE for ear, mouth and throat problems  RESP: NEGATIVE for significant cough or SOB  BREAST: NEGATIVE for masses, tenderness or discharge  CV: NEGATIVE for chest pain, palpitations or peripheral edema  GI: NEGATIVE for nausea, abdominal pain, heartburn, or change in bowel habits  : NEGATIVE for unusual urinary or vaginal symptoms. Periods are regular.  MUSCULOSKELETAL: NEGATIVE for significant arthralgias or myalgia  NEURO: NEGATIVE for weakness, dizziness or paresthesias  PSYCHIATRIC: NEGATIVE for changes in mood or affect    OBJECTIVE:   /70  Pulse 65  Ht 5' 3.5\" (1.613 m)  Wt 112 lb (50.8 kg)  SpO2 99%  BMI 19.53 kg/m2  EXAM:  GENERAL: healthy, alert and no distress  EYES: Eyes " grossly normal to inspection, PERRL and conjunctivae and sclerae normal  HENT: ear canals and TM's normal, nose and mouth without ulcers or lesions  NECK: no adenopathy, no asymmetry, masses, or scars and thyroid normal to palpation  RESP: lungs clear to auscultation - no rales, rhonchi or wheezes  BREAST: normal without masses, tenderness or nipple discharge and no palpable axillary masses or adenopathy  CV: regular rate and rhythm, normal S1 S2, no S3 or S4, no murmur, click or rub, no peripheral edema and peripheral pulses strong  ABDOMEN: soft, nontender, no hepatosplenomegaly, no masses and bowel sounds normal   (female): normal female external genitalia, normal urethral meatus, vaginal mucosa pink, moist, well rugated, and normal cervix/adnexa/uterus without masses or discharge  MS: no gross musculoskeletal defects noted, no edema  SKIN: no suspicious lesions or rashes  NEURO: Normal strength and tone, mentation intact and speech normal  PSYCH: mentation appears normal, affect normal/bright    Results for orders placed or performed in visit on 09/14/18 (from the past 24 hour(s))   Comprehensive metabolic panel   Result Value Ref Range    Sodium 140 133 - 144 mmol/L    Potassium 3.6 3.4 - 5.3 mmol/L    Chloride 105 94 - 109 mmol/L    Carbon Dioxide 29 20 - 32 mmol/L    Anion Gap 6 3 - 14 mmol/L    Glucose 79 70 - 99 mg/dL    Urea Nitrogen 12 7 - 30 mg/dL    Creatinine 0.85 0.52 - 1.04 mg/dL    GFR Estimate 71 >60 mL/min/1.7m2    GFR Estimate If Black 85 >60 mL/min/1.7m2    Calcium 9.5 8.5 - 10.1 mg/dL    Bilirubin Total 0.5 0.2 - 1.3 mg/dL    Albumin 4.4 3.4 - 5.0 g/dL    Protein Total 8.0 6.8 - 8.8 g/dL    Alkaline Phosphatase 83 40 - 150 U/L    ALT 20 0 - 50 U/L    AST 24 0 - 45 U/L   CBC with platelets and differential   Result Value Ref Range    WBC 4.5 4.0 - 11.0 10e9/L    RBC Count 4.74 3.8 - 5.2 10e12/L    Hemoglobin 14.9 11.7 - 15.7 g/dL    Hematocrit 43.2 35.0 - 47.0 %    MCV 91 78 - 100 fl    MCH  31.4 26.5 - 33.0 pg    MCHC 34.5 31.5 - 36.5 g/dL    RDW 11.9 10.0 - 15.0 %    Platelet Count 238 150 - 450 10e9/L    Diff Method Automated Method     % Neutrophils 57.9 %    % Lymphocytes 27.8 %    % Monocytes 9.9 %    % Eosinophils 3.5 %    % Basophils 0.7 %    % Immature Granulocytes 0.2 %    Nucleated RBCs 0 0 /100    Absolute Neutrophil 2.6 1.6 - 8.3 10e9/L    Absolute Lymphocytes 1.3 0.8 - 5.3 10e9/L    Absolute Monocytes 0.5 0.0 - 1.3 10e9/L    Absolute Eosinophils 0.2 0.0 - 0.7 10e9/L    Absolute Basophils 0.0 0.0 - 0.2 10e9/L    Abs Immature Granulocytes 0.0 0 - 0.4 10e9/L    Absolute Nucleated RBC 0.0          ASSESSMENT/PLAN:   1. Health examination of defined subpopulation  Very healthy and active. See in a year   - Comprehensive metabolic panel; Future  - CBC with platelets and differential; Future  - EKG 12-lead complete w/read - (Clinic Performed); Future  - Comprehensive metabolic panel  - CBC with platelets and differential  - EKG 12-lead complete w/read - (Clinic Performed)  - HPV High Risk Types DNA Cervical    2. Breast cancer screening, high risk patient  Higher risk - mammogram ordered     3. High risk HPV infection  Discussed. Hard time following Dr Shirley repeat paps since leep. Discussed.  Did have Other HR HPV in 2015.After long discussion- will do pap today and then move them out to 3 yrs if normal   - A pap thin layer screen with  HPV - recommended age 30 - 65 years (select HPV order below)  - HPV High Risk Types DNA Cervical    4. Depression with anxiety  Stable - needs long term meds     5. Special screening for malignant neoplasms, colon  Discussed. Referral sent   - EKG 12-lead complete w/read - (Clinic Performed); Future  - EKG 12-lead complete w/read - (Clinic Performed)  - GENERAL SURG ADULT REFERRAL    6. Family history of breast cancer in sister  Mammogram ordered  - MA Screening Digital Bilateral; Future    7. Encounter for screening mammogram for breast cancer  As above   - MA  "Screening Digital Bilateral; Future    8. Panic disorder without agoraphobia  Stable- needs long term meds   - PARoxetine (PAXIL) 10 MG tablet; Take 1 tablet (10 mg) by mouth daily  Dispense: 90 tablet; Refill: 4    9. History of cervical dysplasia  As above. Space pap out ot 3 yrs if this one ok   - A pap thin layer screen with  HPV - recommended age 30 - 65 years (select HPV order below)  - HPV High Risk Types DNA Cervical    COUNSELING:   Reviewed preventive health counseling, as reflected in patient instructions       Regular exercise       Healthy diet/nutrition       Colon cancer screening    BP Readings from Last 1 Encounters:   09/13/17 112/70     Estimated body mass index is 19.4 kg/(m^2) as calculated from the following:    Height as of 9/13/17: 5' 4\" (1.626 m).    Weight as of 9/13/17: 113 lb (51.3 kg).           reports that she has never smoked. She has never used smokeless tobacco.      Counseling Resources:  ATP IV Guidelines  Pooled Cohorts Equation Calculator  Breast Cancer Risk Calculator  FRAX Risk Assessment  ICSI Preventive Guidelines  Dietary Guidelines for Americans, 2010  USDA's MyPlate  ASA Prophylaxis  Lung CA Screening    Carlos Hart MD  [facility] [place]  "

## 2018-09-14 ENCOUNTER — OFFICE VISIT (OUTPATIENT)
Dept: FAMILY MEDICINE | Facility: OTHER | Age: 48
End: 2018-09-14
Attending: FAMILY MEDICINE
Payer: COMMERCIAL

## 2018-09-14 ENCOUNTER — TELEPHONE (OUTPATIENT)
Dept: SURGERY | Facility: OTHER | Age: 48
End: 2018-09-14

## 2018-09-14 VITALS
DIASTOLIC BLOOD PRESSURE: 70 MMHG | HEART RATE: 65 BPM | OXYGEN SATURATION: 99 % | BODY MASS INDEX: 19.12 KG/M2 | SYSTOLIC BLOOD PRESSURE: 118 MMHG | WEIGHT: 112 LBS | HEIGHT: 64 IN

## 2018-09-14 DIAGNOSIS — F41.0 PANIC DISORDER WITHOUT AGORAPHOBIA: ICD-10-CM

## 2018-09-14 DIAGNOSIS — Z12.39 BREAST CANCER SCREENING, HIGH RISK PATIENT: ICD-10-CM

## 2018-09-14 DIAGNOSIS — Z87.410 HISTORY OF CERVICAL DYSPLASIA: ICD-10-CM

## 2018-09-14 DIAGNOSIS — Z80.3 FAMILY HISTORY OF BREAST CANCER IN SISTER: ICD-10-CM

## 2018-09-14 DIAGNOSIS — B97.7 HIGH RISK HPV INFECTION: ICD-10-CM

## 2018-09-14 DIAGNOSIS — Z02.89 HEALTH EXAMINATION OF DEFINED SUBPOPULATION: Primary | ICD-10-CM

## 2018-09-14 DIAGNOSIS — F41.8 DEPRESSION WITH ANXIETY: ICD-10-CM

## 2018-09-14 DIAGNOSIS — Z12.31 ENCOUNTER FOR SCREENING MAMMOGRAM FOR BREAST CANCER: ICD-10-CM

## 2018-09-14 DIAGNOSIS — Z12.11 SPECIAL SCREENING FOR MALIGNANT NEOPLASMS, COLON: ICD-10-CM

## 2018-09-14 LAB
ALBUMIN SERPL-MCNC: 4.4 G/DL (ref 3.4–5)
ALP SERPL-CCNC: 83 U/L (ref 40–150)
ALT SERPL W P-5'-P-CCNC: 20 U/L (ref 0–50)
ANION GAP SERPL CALCULATED.3IONS-SCNC: 6 MMOL/L (ref 3–14)
AST SERPL W P-5'-P-CCNC: 24 U/L (ref 0–45)
BASOPHILS # BLD AUTO: 0 10E9/L (ref 0–0.2)
BASOPHILS NFR BLD AUTO: 0.7 %
BILIRUB SERPL-MCNC: 0.5 MG/DL (ref 0.2–1.3)
BUN SERPL-MCNC: 12 MG/DL (ref 7–30)
CALCIUM SERPL-MCNC: 9.5 MG/DL (ref 8.5–10.1)
CHLORIDE SERPL-SCNC: 105 MMOL/L (ref 94–109)
CO2 SERPL-SCNC: 29 MMOL/L (ref 20–32)
CREAT SERPL-MCNC: 0.85 MG/DL (ref 0.52–1.04)
DIFFERENTIAL METHOD BLD: NORMAL
EOSINOPHIL # BLD AUTO: 0.2 10E9/L (ref 0–0.7)
EOSINOPHIL NFR BLD AUTO: 3.5 %
ERYTHROCYTE [DISTWIDTH] IN BLOOD BY AUTOMATED COUNT: 11.9 % (ref 10–15)
GFR SERPL CREATININE-BSD FRML MDRD: 71 ML/MIN/1.7M2
GLUCOSE SERPL-MCNC: 79 MG/DL (ref 70–99)
HCT VFR BLD AUTO: 43.2 % (ref 35–47)
HGB BLD-MCNC: 14.9 G/DL (ref 11.7–15.7)
IMM GRANULOCYTES # BLD: 0 10E9/L (ref 0–0.4)
IMM GRANULOCYTES NFR BLD: 0.2 %
LYMPHOCYTES # BLD AUTO: 1.3 10E9/L (ref 0.8–5.3)
LYMPHOCYTES NFR BLD AUTO: 27.8 %
MCH RBC QN AUTO: 31.4 PG (ref 26.5–33)
MCHC RBC AUTO-ENTMCNC: 34.5 G/DL (ref 31.5–36.5)
MCV RBC AUTO: 91 FL (ref 78–100)
MONOCYTES # BLD AUTO: 0.5 10E9/L (ref 0–1.3)
MONOCYTES NFR BLD AUTO: 9.9 %
NEUTROPHILS # BLD AUTO: 2.6 10E9/L (ref 1.6–8.3)
NEUTROPHILS NFR BLD AUTO: 57.9 %
NRBC # BLD AUTO: 0 10*3/UL
NRBC BLD AUTO-RTO: 0 /100
PLATELET # BLD AUTO: 238 10E9/L (ref 150–450)
POTASSIUM SERPL-SCNC: 3.6 MMOL/L (ref 3.4–5.3)
PROT SERPL-MCNC: 8 G/DL (ref 6.8–8.8)
RBC # BLD AUTO: 4.74 10E12/L (ref 3.8–5.2)
SODIUM SERPL-SCNC: 140 MMOL/L (ref 133–144)
WBC # BLD AUTO: 4.5 10E9/L (ref 4–11)

## 2018-09-14 PROCEDURE — 80053 COMPREHEN METABOLIC PANEL: CPT | Performed by: FAMILY MEDICINE

## 2018-09-14 PROCEDURE — 85025 COMPLETE CBC W/AUTO DIFF WBC: CPT | Performed by: FAMILY MEDICINE

## 2018-09-14 PROCEDURE — 36415 COLL VENOUS BLD VENIPUNCTURE: CPT | Performed by: FAMILY MEDICINE

## 2018-09-14 PROCEDURE — 93000 ELECTROCARDIOGRAM COMPLETE: CPT | Performed by: INTERNAL MEDICINE

## 2018-09-14 PROCEDURE — 99396 PREV VISIT EST AGE 40-64: CPT | Performed by: FAMILY MEDICINE

## 2018-09-14 PROCEDURE — 87624 HPV HI-RISK TYP POOLED RSLT: CPT | Mod: 90 | Performed by: FAMILY MEDICINE

## 2018-09-14 PROCEDURE — 88142 CYTOPATH C/V THIN LAYER: CPT | Performed by: FAMILY MEDICINE

## 2018-09-14 PROCEDURE — 99000 SPECIMEN HANDLING OFFICE-LAB: CPT | Performed by: FAMILY MEDICINE

## 2018-09-14 RX ORDER — PAROXETINE 10 MG/1
10 TABLET, FILM COATED ORAL DAILY
Qty: 90 TABLET | Refills: 4 | Status: SHIPPED | OUTPATIENT
Start: 2018-09-14 | End: 2019-10-05

## 2018-09-14 ASSESSMENT — ANXIETY QUESTIONNAIRES
3. WORRYING TOO MUCH ABOUT DIFFERENT THINGS: NOT AT ALL
GAD7 TOTAL SCORE: 0
1. FEELING NERVOUS, ANXIOUS, OR ON EDGE: NOT AT ALL
5. BEING SO RESTLESS THAT IT IS HARD TO SIT STILL: NOT AT ALL
4. TROUBLE RELAXING: NOT AT ALL
7. FEELING AFRAID AS IF SOMETHING AWFUL MIGHT HAPPEN: NOT AT ALL
6. BECOMING EASILY ANNOYED OR IRRITABLE: NOT AT ALL
2. NOT BEING ABLE TO STOP OR CONTROL WORRYING: NOT AT ALL

## 2018-09-14 ASSESSMENT — PAIN SCALES - GENERAL: PAINLEVEL: NO PAIN (0)

## 2018-09-14 NOTE — MR AVS SNAPSHOT
After Visit Summary   9/14/2018    Kacy Valdez    MRN: [id number]           Patient Information     Date Of Birth          1970        Visit Information        Provider Department      9/14/2018 8:00 AM Carlos Hart MD [facility]        Today's Diagnoses     Health examination of defined subpopulation    -  1    Breast cancer screening, high risk patient        High risk HPV infection        Depression with anxiety        Special screening for malignant neoplasms, colon        Family history of breast cancer in sister        Encounter for screening mammogram for breast cancer        Panic disorder without agoraphobia        History of cervical dysplasia          Care Instructions      Preventive Health Recommendations  Female Ages 50 - 64    Yearly exam: See your health care provider every year in order to  o Review health changes.   o Discuss preventive care.    o Review your medicines if your doctor has prescribed any.      Get a Pap test every three years (unless you have an abnormal result and your provider advises testing more often).    If you get Pap tests with HPV test, you only need to test every 5 years, unless you have an abnormal result.     You do not need a Pap test if your uterus was removed (hysterectomy) and you have not had cancer.    You should be tested each year for STDs (sexually transmitted diseases) if you're at risk.     Have a mammogram every 1 to 2 years.    Have a colonoscopy at age 50, or have a yearly FIT test (stool test). These exams screen for colon cancer.      Have a cholesterol test every 5 years, or more often if advised.    Have a diabetes test (fasting glucose) every three years. If you are at risk for diabetes, you should have this test more often.     If you are at risk for osteoporosis (brittle bone disease), think about having a bone density scan (DEXA).    Shots: Get a flu shot each year. Get a tetanus shot every 10  years.    Nutrition:     Eat at least 5 servings of fruits and vegetables each day.    Eat whole-grain bread, whole-wheat pasta and brown rice instead of white grains and rice.    Get adequate Calcium and Vitamin D.     Lifestyle    Exercise at least 150 minutes a week (30 minutes a day, 5 days a week). This will help you control your weight and prevent disease.    Limit alcohol to one drink per day.    No smoking.     Wear sunscreen to prevent skin cancer.     See your dentist every six months for an exam and cleaning.    See your eye doctor every 1 to 2 years.  Results for orders placed or performed in visit on 09/14/18 (from the past 24 hour(s))   Comprehensive metabolic panel   Result Value Ref Range    Sodium 140 133 - 144 mmol/L    Potassium 3.6 3.4 - 5.3 mmol/L    Chloride 105 94 - 109 mmol/L    Carbon Dioxide 29 20 - 32 mmol/L    Anion Gap 6 3 - 14 mmol/L    Glucose 79 70 - 99 mg/dL    Urea Nitrogen 12 7 - 30 mg/dL    Creatinine 0.85 0.52 - 1.04 mg/dL    GFR Estimate 71 >60 mL/min/1.7m2    GFR Estimate If Black 85 >60 mL/min/1.7m2    Calcium 9.5 8.5 - 10.1 mg/dL    Bilirubin Total 0.5 0.2 - 1.3 mg/dL    Albumin 4.4 3.4 - 5.0 g/dL    Protein Total 8.0 6.8 - 8.8 g/dL    Alkaline Phosphatase 83 40 - 150 U/L    ALT 20 0 - 50 U/L    AST 24 0 - 45 U/L   CBC with platelets and differential   Result Value Ref Range    WBC 4.5 4.0 - 11.0 10e9/L    RBC Count 4.74 3.8 - 5.2 10e12/L    Hemoglobin 14.9 11.7 - 15.7 g/dL    Hematocrit 43.2 35.0 - 47.0 %    MCV 91 78 - 100 fl    MCH 31.4 26.5 - 33.0 pg    MCHC 34.5 31.5 - 36.5 g/dL    RDW 11.9 10.0 - 15.0 %    Platelet Count 238 150 - 450 10e9/L    Diff Method Automated Method     % Neutrophils 57.9 %    % Lymphocytes 27.8 %    % Monocytes 9.9 %    % Eosinophils 3.5 %    % Basophils 0.7 %    % Immature Granulocytes 0.2 %    Nucleated RBCs 0 0 /100    Absolute Neutrophil 2.6 1.6 - 8.3 10e9/L    Absolute Lymphocytes 1.3 0.8 - 5.3 10e9/L    Absolute Monocytes 0.5 0.0 - 1.3  10e9/L    Absolute Eosinophils 0.2 0.0 - 0.7 10e9/L    Absolute Basophils 0.0 0.0 - 0.2 10e9/L    Abs Immature Granulocytes 0.0 0 - 0.4 10e9/L    Absolute Nucleated RBC 0.0      Colonoscopy  What You Should Know  Please arrive with an adult who can drive you home after the exam and stay with you for the next six hours. The medicines used in the exam will make you sleepy. You will not be able to drive. You cannot take a bus or taxi by yourself.  What is a colonoscopy?  A colonoscopy lets the doctor look inside your large intestine (colon). The doctor guides a scope, or small camera, through the entire colon. The scope is attached to a long, flexible tube. The image from the scope appears on a large TV screen.   The scope will send air into your colon. This   opens the colon so the doctor can see it better on the screen.  The exam looks for defects such as inflamed tissue, polyps, ulcers (sores), diverticula (pouches in the wall of the colon) and early signs of cancer.  How do I prepare for the exam?  Read your guidelines for cleansing the colon. It is highly important that you follow the directions closely. If your colon is empty and clean, your exam will be more thorough and take less time.   Be sure to tell your doctor or nurse if you have ever had eye, lung or heart disease (including endocarditis or an artificial valve); diabetes; hepatitis; or any allergies to medicines.  Plan to arrive on time for your exam.    Dress in comfortable, loose clothing.    Bring your insurance card. Leave your purse, billfold, credit cards and other valuables at home.    Bring a list of your medicines and known allergies. If you have a pacemaker or ICD, please bring your information card.    We do our best to stay on time, but there may be a delay. Please bring something to pass the time, such as a newspaper or book.  What happens when I arrive?    You will fill out some paperwork, then we will take you to a private area. A nurse  will check your records and ask you questions.    You will change into a hospital gown. We may ask you to remove any jewelry.    We will review the risks and benefits of the exam. You will need to sign a consent form.    We will insert an IV (thin tube) into a vein   in your hand or arm. We will use this to give   you medicine.  What happens during the exam?  The exam takes from 15 minutes to one hour.   You may ask questions of the doctor.    You will lie on your left side on a table.    You will receive medicines to relieve pain and help you relax.    The doctor will insert the scope into your rectum (bottom). The scope is on a long, thin tube.   The doctor will slowly guide the scope into your colon. The tube bends, so it can move through the curves of your colon.    We may ask you to change positions to help the doctor move the scope.  If we see any polyps (growths), we will remove them. We do this because polyps can cause bleeding or turn into cancer. For some polyps, we will take tissue (a biopsy) to test in the lab. Most polyps turn out to be benign (not cancer).   Will it hurt?  You should feel little or no discomfort. The air will make you will feel full, and you will notice some pressure as the tube passes through your colon. Tell your doctor or nurse if you feel any pain. If you have cramps, breathe slowly to help your body relax.   What happens after the exam?    We will take you to the recovery area. We will watch you for up to one hour or until most of the medicine has worn off.    We will remove the IV. You will receive a report about your exam.    You may feel bloated or crampy for a short time because of the air that was injected. You will need to be passing air before you go home.    Your first meal should be light. Slowly return to normal meals, unless your doctor gives you other orders. Take it easy the rest of the day.    If you had a polyp removed:  ? Avoid heavy exercise for one week (no heavy  lifting, sports or other activity).  ? Avoid nuts and popcorn for one week.  ? You may have bleeding for several days after your exam. Call your doctor if bleeding is heavy or you have black or bloody stools (bowel movements).  ? If you normally take aspirin or blood thinners, ask your doctor when you can start taking them again.    You may receive more than one bill for your exam. (Separate charges may come from the clinic, doctor, lab, etc.).  What if I need to change my appointment?  If you cannot keep your appointment, please call us as soon as you can. Our center is very busy, and we will need to contact the patient who comes after you.     [facility]: [phone]    [facility]: [phone]    [facility]: [phone]    [facility]:   [phone]    [facility]: Call your clinic    [facility]: Call your clinic    [facility]: [phone]    [facility]: [phone]    Your clinic: ______________________________  For informational purposes only. Not to replace the advice of your health care provider.   Copyright   2007 [employer] [facility]. All rights reserved. Developed in   collaboration with [facility] [employer]. OLSET [id number] - REV 3/18    Colonoscopy     A camera attached to a flexible tube with a viewing lens is used to take video pictures.     Colonoscopy is a test to view the inside of your lower digestive tract (colon and rectum). Sometimes it can show the last part of the small intestine (ileum). During the test, small pieces of tissue may be removed for testing. This is called a biopsy. Small growths, such as polyps, may also be removed.   Why is colonoscopy done?  The test is done to help look for colon cancer. And it can help find the source of abdominal pain, bleeding, and changes in bowel habits. It may be needed  once a year, depending on factors such as your:    Age    Health history    Family health history    Symptoms    Results from any prior colonoscopy  Risks and possible complications  These include:    Bleeding                 A puncture or tear in the colon     Risks of anesthesia    A cancer lesion not being seen  Getting ready   To prepare for the test:    Talk with your healthcare provider about the risks of the test (see below). Also ask your healthcare provider about alternatives to the test.    Tell your healthcare provider about any medicines you take. Also tell him or her about any health conditions you may have.    Make sure your rectum and colon are empty for the test. Follow the diet and bowel prep instructions exactly. If you don t, the test may need to be rescheduled.    Plan for a friend or family member to drive you home after the test.     Colonoscopy provides an inside view of the entire colon.     You may discuss the results with your doctor right away or at a future visit.  During the test   The test is usually done in the hospital on an outpatient basis. This means you go home the same day. The procedure takes about 30 minutes. During that time:    You are given relaxing (sedating) medicine through an IV line. You may be drowsy, or fully asleep.    The healthcare provider will first give you a physical exam to check for anal and rectal problems.    Then the anus is lubricated and the scope inserted.    If you are awake, you may have a feeling similar to needing to have a bowel movement. You may also feel pressure as air is pumped into the colon. It s OK to pass gas during the procedure.    Biopsy, polyp removal, or other treatments may be done during the test.  After the test   You may have gas right after the test. It can help to try to pass it to help prevent later bloating. Your healthcare provider may discuss the results with you right away. Or you may need to schedule a follow-up visit to  talk about the results. After the test, you can go back to your normal eating and other activities. You may be tired from the sedation and need to rest for a few hours.  Date Last Reviewed: 11/1/2016 2000-2017 [employer]. [address] [place] [address]. All rights reserved. This information is not intended as a substitute for professional medical care. Always follow your healthcare professional's instructions.                Follow-ups after your visit        Additional Services     GENERAL SURG ADULT REFERRAL       Your provider has referred you to:[facility] can be meet and greet   Please be aware that coverage of these services is subject to the terms and limitations of your health insurance plan.  Call member services at your health plan with any benefit or coverage questions.      Please bring the following with you to your appointment:    (1) Any X-Rays, CTs or MRIs which have been performed.  Contact the facility where they were done to arrange for  prior to your scheduled appointment.   (2) List of current medications   (3) This referral request   (4) Any documents/labs given to you for this referral                  Future tests that were ordered for you today     Open Future Orders        Priority Expected Expires Ordered    MA Screening Digital Bilateral Routine  9/14/2019 9/14/2018            Who to contact     If you have questions or need follow up information about today's clinic visit or your schedule please contact [facility] directly at [phone].  Normal or non-critical lab and imaging results will be communicated to you by MyChart, letter or phone within 4 business days after the clinic has received the results. If you do not hear from us within 7 days, please contact the clinic through MyChart or phone. If you have a critical or abnormal lab result, we will notify you by phone as soon as possible.  Submit refill requests through [employer] or call  "your pharmacy and they will forward the refill request to us. Please allow 3 business days for your refill to be completed.          Additional Information About Your Visit        MyChart Information     [employer] lets you send messages to your doctor, view your test results, renew your prescriptions, schedule appointments and more. To sign up, go to [email or web address] . Click on \"Log in\" on the left side of the screen, which will take you to the Welcome page. Then click on \"Sign up Now\" on the right side of the page.     You will be asked to enter the access code listed below, as well as some personal information. Please follow the directions to create your username and password.     Your access code is: [email or web address]  Expires: 2018  8:37 AM     Your access code will  in 90 days. If you need help or a new code, please call your [facility] clinic or [phone].        Care EveryWhere ID     This is your Care EveryWhere ID. This could be used by other organizations to access your [facility] medical records  [id number]        Your Vitals Were     Pulse Height Pulse Oximetry BMI (Body Mass Index)          65 5' 3.5\" (1.613 m) 99% 19.53 kg/m2         Blood Pressure from Last 3 Encounters:   18 118/70   17 112/70   17 112/64    Weight from Last 3 Encounters:   18 112 lb (50.8 kg)   17 113 lb (51.3 kg)   17 113 lb (51.3 kg)              We Performed the Following     A pap thin layer screen with  HPV - recommended age 30 - 65 years (select HPV order below)     CBC with platelets and differential     Comprehensive metabolic panel     GENERAL SURG ADULT REFERRAL     HPV High Risk Types DNA Cervical          Where to get your medicines      These medications were sent to [id number] - [place] - [address]  [address] [address]     Phone:  [phone]     PARoxetine 10 MG tablet          Primary Care Provider Office Phone # Fax #    Carlos Hart, " [phone] [address] [facility] [address]        Equal Access to Services     RISHI BUTLER : Hadii aad ku hadidaniazeyad Adelia, waappleda luannalisevinnie, qaybta kafrankda kristen, marita brookein hayaapatrick peoplesnewton verde ivy roberson. So [facility] [phone].    ATENCIÓN: Si habla español, tiene a caceres disposición servicios gratuitos de asistencia lingüística. Zackame al [phone].    We comply with applicable federal civil rights laws and [place] laws. We do not discriminate on the basis of race, color, national origin, age, disability, sex, sexual orientation, or gender identity.            Thank you!     Thank you for choosing [facility]  for your care. Our goal is always to provide you with excellent care. Hearing back from our patients is one way we can continue to improve our services. Please take a few minutes to complete the written survey that you may receive in the mail after your visit with us. Thank you!             Your Updated Medication List - Protect others around you: Learn how to safely use, store and throw away your medicines at www.disposemymeds.org.          This list is accurate as of 9/14/18  8:50 AM.  Always use your most recent med list.                   Brand Name Dispense Instructions for use Diagnosis    EPINEPHrine 0.3 MG/0.3ML injection 2-pack    EPIPEN/ADRENACLICK/or ANY BX GENERIC EQUIV    0.6 mL    Inject 0.3 mLs (0.3 mg) into the muscle as needed for anaphylaxis    Nut allergy       levonorgestrel 20 MCG/24HR IUD    MIRENA     1 each (20 mcg) by Intrauterine route continuous    Family planning       nystatin-triamcinolone cream    MYCOLOG II    100 g    Apply small amt to area nightly    Vaginitis and vulvovaginitis, unspecified, Routine gynecological examination       PARoxetine 10 MG tablet    PAXIL    90 tablet    Take 1 tablet (10 mg) by mouth daily    Panic disorder without agoraphobia

## 2018-09-14 NOTE — LETTER
My Depression Action Plan  Name: Kacy Valdez   Date of Birth 1970  Date: 9/14/2018    My doctor: Carlos Hart   My clinic: [facility] [place]  [place] [address]  [phone]          GREEN    ZONE   Good Control    What it looks like:     Things are going generally well. You have normal up s and down s. You may even feel depressed from time to time, but bad moods usually last less than a day.   What you need to do:  1. Continue to care for yourself (see self care plan)  2. Check your depression survival kit and update it as needed  3. Follow your physician s recommendations including any medication.  4. Do not stop taking medication unless you consult with your physician first.           YELLOW         ZONE Getting Worse    What it looks like:     Depression is starting to interfere with your life.     It may be hard to get out of bed; you may be starting to isolate yourself from others.    Symptoms of depression are starting to last most all day and this has happened for several days.     You may have suicidal thoughts but they are not constant.   What you need to do:     1. Call your care team, your response to treatment will improve if you keep your care team informed of your progress. Yellow periods are signs an adjustment may need to be made.     2. Continue your self-care, even if you have to fake it!    3. Talk to someone in your support network    4. Open up your depression survival kit           RED    ZONE Medical Alert - Get Help    What it looks like:     Depression is seriously interfering with your life.     You may experience these or other symptoms: You can t get out of bed most days, can t work or engage in other necessary activities, you have trouble taking care of basic hygiene, or basic responsibilities, thoughts of suicide or death that will not go away, self-injurious behavior.     What you need to do:  1. Call your care team and request a same-day  appointment. If they are not available (weekends or after hours) call your local crisis line, emergency room or 911.            Depression Self Care Plan / Survival Kit    Self-Care for Depression  Here s the deal. Your body and mind are really not as separate as most people think.  What you do and think affects how you feel and how you feel influences what you do and think. This means if you do things that people who feel good do, it will help you feel better.  Sometimes this is all it takes.  There is also a place for medication and therapy depending on how severe your depression is, so be sure to consult with your medical provider and/ or Behavioral Health Consultant if your symptoms are worsening or not improving.     In order to better manage my stress, I will:    Exercise  Get some form of exercise, every day. This will help reduce pain and release endorphins, the  feel good  chemicals in your brain. This is almost as good as taking antidepressants!  This is not the same as joining a gym and then never going! (they count on that by the way ) It can be as simple as just going for a walk or doing some gardening, anything that will get you moving.      Hygiene   Maintain good hygiene (Get out of bed in the morning, Make your bed, Brush your teeth, Take a shower, and Get dressed like you were going to work, even if you are unemployed).  If your clothes don't fit try to get ones that do.    Diet  I will strive to eat foods that are good for me, drink plenty of water, and avoid excessive sugar, caffeine, alcohol, and other mood-altering substances.  Some foods that are helpful in depression are: complex carbohydrates, B vitamins, flaxseed, fish or fish oil, fresh fruits and vegetables.    Psychotherapy  I agree to participate in Individual Therapy (if recommended).    Medication  If prescribed medications, I agree to take them.  Missing doses can result in serious side effects.  I understand that drinking alcohol,  or other illicit drug use, may cause potential side effects.  I will not stop my medication abruptly without first discussing it with my provider.    Staying Connected With Others  I will stay in touch with my friends, family members, and my primary care provider/team.    Use your imagination  Be creative.  We all have a creative side; it doesn t matter if it s oil painting, sand castles, or mud pies! This will also kick up the endorphins.    Witness Beauty  (AKA stop and smell the roses) Take a look outside, even in mid-winter. Notice colors, textures. Watch the squirrels and birds.     Service to others  Be of service to others.  There is always someone else in need.  By helping others we can  get out of ourselves  and remember the really important things.  This also provides opportunities for practicing all the other parts of the program.    Humor  Laugh and be silly!  Adjust your TV habits for less news and crime-drama and more comedy.    Control your stress  Try breathing deep, massage therapy, biofeedback, and meditation. Find time to relax each day.     My support system    Clinic Contact:  Phone number:    Contact 1:  Phone number:    Contact 2:  Phone number:    Gnosticist/:  Phone number:    Therapist:  Phone number:    Local crisis center:    Phone number:    Other community support:  Phone number:

## 2018-09-14 NOTE — NURSING NOTE
"Chief Complaint   Patient presents with     Physical       Initial /70  Pulse 65  Ht 5' 3.5\" (1.613 m)  Wt 112 lb (50.8 kg)  SpO2 99%  BMI 19.53 kg/m2 Estimated body mass index is 19.53 kg/(m^2) as calculated from the following:    Height as of this encounter: 5' 3.5\" (1.613 m).    Weight as of this encounter: 112 lb (50.8 kg).  Medication Reconciliation: complete    Shaw Fu LPN  "

## 2018-09-14 NOTE — LETTER
September 17, 2018          Kacy S Courtney  [address]   [facility] [address]        Dear Kacy,      Thank you for allowing Dr. SOMERS and our surgical team to participate in your care. Please review the following instructions and information to prepare for your upcoming colonoscopy and feel free to call our office with any questions.      Guide to your Colonoscopy with Gatorade prep       Date of Procedure October 4TH with Dr. SOMERS    Admit time: Surgery Department will call you the day before your procedure by 5pm with your admit time. If your surgery is on Monday, please expect a call on Friday.    If we were unable to reach you before 5PM, you may call admitting at [phone] or toll free at [phone] ext [phone]    Please call the Registered Nurse in Surgery Education at [phone] or toll free at [phone] one to two weeks before your procedure and have an allergy and medication list ready      YOUR UPCOMING COLONOSCOPY    At [facility], we want to make sure that your colonoscopy is as pleasant as possible. This guide is designed to answer any questions you might have and to walk you through the preparations you will need to make before your procedure.    Should you have additional questions, please feel free to contact us. Contact numbers are listed below. Thank you for choosing [facility].    Important Numbers    Clinic Health Unit Coordinator: [phone]  Clinic Nurse: [phone] (or [phone]; [phone])  Surgery Education Nurse: [phone] or toll free [phone]    All nursing questions or concerns can be directed to the clinic or surgery education nurse    If you have a scheduling or appointment question, or need to postpone your procedure, please call the Health Unit Coordinator between 8am and 4pm Monday through Friday.    After hours or on weekends, please call [phone] to postpone.     Call the surgery nurse or your primary care provider if you should  "become ill within 1 week of your procedure and we will reschedule it when you are healthy. This includes signs or symptoms of a cold or the flu. This can include fever, chills, sore throat, cough, chest congestions, productive cough, runny nose.     y.                    COLONOSCOPY PREP    7 DAYS BEFORE THE EXAM:     Do not take Aspirin (325mg) or other NSAIDS (Ibuprofen, Motrin, Aleve, Celebrex, Naproxen, etc) 7 days before your surgery. Tylenol is fine.  Stop taking fiber supplements, vitamins, iron or multivitamins that contain iron.  Stop eating nuts and seeds.    If you are prescribed blood thinners (Aspirin, Coumadin/Warfarin, Plavix, etc) talk to your provider.  If you are a diabetic and take medications to control your blood sugar, follow the special instructions listed or talk to your provider.     Arrange transportation with a family member or friend to drive you home and have an adult available to stay with you for the next 4 hours when you arrive home for your safety. If you need to take a taxi or the bus, you MUST have a responsible adult to ride with you OR YOUR PROCEDURE WILL BE CANCELLED. It is recommended that you DO NOT DRIVE for the next 24 hours after receiving anesthesia.     You have been ordered Gatorade Prep as your mechanical bowel prep. Please  the items listed below from your preferred pharmacy. They are all over the counter and do not require a prescription.     Two 5 mg Dulcolax (bisacodyl) tablets   One 8.3 ounce bottle of miralax   One 10 ounce bottle of magnesium citrate   One 64 ounce bottle of gatorade-No red or purple, not powdered.      2 DAYS BEFORE THE EXAM:   Begin a low fiber diet. No raw fruits and vegetables or whole grains.  Please see the list of foods you can have and foods to avoid on page 3 of the separate \"Miralax, Dulcolax and Magnesium Citrate\" colonoscopy instruction packet.   Drink at least 4-6 large glasses of sports drink each day (not red or " "purple).      1 DAY BEFORE THE EXAM:  DO NOT EAT ANY SOLID FOOD OR MILK PRODUCTS AFTER 12:00 AM (MIDNIGHT).    Drink only clear liquids for breakfast, lunch and dinner. (No red or purple)  Please see the list of liquids you can have and what to avoid on page 2 of the separate \"Miralax, Dulcolax and Magnesium Citrate\" colonoscopy instruction packet.     Drink at least 8-10 glasses clear liquids during the day.  Follow the instructions of your colon preparation.  No red or purple colors, milk products, or alcohol.     AT 12:00 PM NOON THE DAY BEFORE EXAM:  Take 2 Dulcolax tablets by mouth with clear liquids.    AT 6:00 PM THE DAY BEFORE EXAM:  Mix the bottle of Miralax and 64 ounces of Gatorade in a pitcher. Drink one 8 ounce glass every 15 minutes until gone.  Stay near a toilet. You will have watery stools and may have cramping, bloating and nausea.    DAY OF COLONOSCOPY EXAM:     6 HOURS PRIOR TO EXAM:  Drink the bottle of magnesium citrate. Right after drinking the medication, drink one full glass of water.    You many have clear liquids up until 3 hours before you check in at admitting.  Wear comfortable clothes. No jewelry, body piercings, make-up, nail polish, hair spray, lotions, perfumes or colognes. Shower before you arrive.  Take blood pressure and heart medications as usual with a sip of water.  If you have asthma, bring your inhaler with you.  [place] in Admitting through the [facility].  You must have a  with you and and adult available to stay with your for 4 hours at home. The medicine used in this test will make you sleepy. If you do not have someone, please reschedule or your test will be cancelled.  It is recommended that you do not drive for 24 hours after your test. Do not operate power equipment, drink alcoholic beverages, make important decisions or sign legal documents.     COLONOSCOPY FREQUENTLY ASKED QUESTIONS    What is a colonoscopy?    A colonoscopy is a test to look at the " "lining of your large intestine. The purpose of the exam is to check for abnormalities including growths called \"polyps\" that can lead to serious disease. A flexibles scope is inserted into your rectum by the doctor to examine your large intestine.    What are polyps?  Polyps are abnormal growths on the lining of the colon. Most polyps are not cancerous, but some polyps have the potential to turn into cancer with time. Polyps can also bleed. For these reasons, most polyps are removed during a colonoscopy and sent to the laboratory for microscopic examination.    What preparation is needed?  The colon must be completely clean for the procedure to be performed. You may be given one or two different prep solutions to cleanse your bowel. You will also need to follow a clear liquid diet the day before your procedure.    What happens after the procedure?  After your procedure is complete, you will be taken back to your day surgery room where you will be monitored for approximately 1 hour. You can expect to feel drowsy for several hours afterward. You may experience some cramping or bloating due to the air introduced into your colon during the exam. You will not be able to drive or operate machinery the rest of the day. You will be given written discharge instructions and appropriate learning material before you go home. You must have an adult to stay at home with you for the next 4 hours after you leave the hospital for your safety.    When will I find out the results of my test?  Your surgeon will talk to you and your designated  before you leave and usually the preliminary results can be given to you at that time. If a biopsy was taken during your procedure, it will be sent to the laboratory for examination. Results usually take one week. You will be contacted by phone or by letter with results.      TIPS FOR COLON CLEANSING BEFORE YOUR COLONOSCOPY    To get accurate results from your exam, your colon must be " completely clean and empty. Please follow your doctor's instructions. If you do not, you may need to repeat both the exam and colon-cleansing process.    The medicine you take may cause bloating, nausea and other discomfort. Follow these tips to make the process as easy as possible:     You may use alcohol-free baby wipes to ease anal irritation. You may also use Vaseline to help protect the skin. Other options include Tucks wipes, hemorrhoid treatments and hydrocortisone cream.     You may wish to squeeze some lemon juice into your preparation or add a packet of Crystal Light lemon-lime or ice tea flavor. (remember to not use red or purple)    To chill the solution, put it in your refrigerator or set it in a bowl of ice. Do not add ice in your drinking glass. You may remove the colon preparation from the refrigerator 15-30 minutes before drinking.    Quickly drink one whole glass every 5 to 10 minutes. It may help to use a timer. If the liquid is too salty, use a straw.    Stay near a toilet!    You will have diarrhea (loose watery stools) and may also have chills. Dress for comfort.    Expect to feel discomfort until the stool clears from your colon. This usually takes about 2 to 4 hours.    Even when you are sitting on the toilet, keep drinking a glass of solution every 10-15 minutes.    If you have nausea or vomiting, rinse your mouth with water. Take a break for 15 to 30 minutes, and then keep drinking the solution.    Some people find it helpful to suck on a wedge of lemon or lime. You may also try sucking on hard candy (not red or purple) or washing your mouth out with water, clear soda or mouthwash.    If you followed your doctor's orders and your stool is a clear or yellow liquid, you are ready for the exam.    If you are not sure if your colon is clean, please call your clinic and ask to speak to a nurse.                         Sincerely,        Jese Rizzo MD

## 2018-09-14 NOTE — TELEPHONE ENCOUNTER
Patient contacted via telephone regarding a referral sent by Dr Hart for a meet and greet colonoscopy.  Patient has been cleared by the surgery education department to have a meet and greet colonoscopy.  Message left with the direct line to the [facility] surgery department nurses to contact regarding setting up this procedure.

## 2018-09-15 ASSESSMENT — ANXIETY QUESTIONNAIRES: GAD7 TOTAL SCORE: 0

## 2018-09-15 ASSESSMENT — PATIENT HEALTH QUESTIONNAIRE - PHQ9: SUM OF ALL RESPONSES TO PHQ QUESTIONS 1-9: 0

## 2018-09-18 DIAGNOSIS — Z12.11 SPECIAL SCREENING FOR MALIGNANT NEOPLASMS, COLON: Primary | ICD-10-CM

## 2018-09-19 LAB
COPATH REPORT: NORMAL
PAP: NORMAL

## 2018-09-21 LAB
FINAL DIAGNOSIS: NORMAL
HPV HR 12 DNA CVX QL NAA+PROBE: NEGATIVE
HPV16 DNA SPEC QL NAA+PROBE: NEGATIVE
HPV18 DNA SPEC QL NAA+PROBE: NEGATIVE
SPECIMEN DESCRIPTION: NORMAL
SPECIMEN SOURCE CVX/VAG CYTO: NORMAL

## 2018-09-24 ENCOUNTER — RADIANT APPOINTMENT (OUTPATIENT)
Dept: MAMMOGRAPHY | Facility: OTHER | Age: 48
End: 2018-09-24
Attending: FAMILY MEDICINE
Payer: COMMERCIAL

## 2018-09-24 DIAGNOSIS — Z12.31 ENCOUNTER FOR SCREENING MAMMOGRAM FOR BREAST CANCER: ICD-10-CM

## 2018-09-24 DIAGNOSIS — Z80.3 FAMILY HISTORY OF BREAST CANCER IN SISTER: ICD-10-CM

## 2018-09-24 PROCEDURE — 77067 SCR MAMMO BI INCL CAD: CPT | Mod: TC

## 2018-09-24 PROCEDURE — 77063 BREAST TOMOSYNTHESIS BI: CPT | Mod: TC

## 2018-09-27 NOTE — H&P (VIEW-ONLY)
SUBJECTIVE:   CC: Kacy Valdez is an 48 year old woman who presents for preventive health visit.     Healthy Habits:    Do you get at least three servings of calcium containing foods daily (dairy, green leafy vegetables, etc.)? yes    Amount of exercise or daily activities, outside of work: 6 day(s) per week    Problems taking medications regularly No    Medication side effects: No    Have you had an eye exam in the past two years? no    Do you see a dentist twice per year? yes    Do you have sleep apnea, excessive snoring or daytime drowsiness?no      Depression and Anxiety Follow-Up    Status since last visit: No change    Other associated symptoms:None    Complicating factors:     Significant life event: No     Current substance abuse: None    PHQ-9 7/14/2017 8/23/2017 9/14/2018   Total Score 0 0 0   Q9: Suicide Ideation Not at all Not at all Not at all     REGINA-7 SCORE 7/14/2017 8/23/2017 9/14/2018   Total Score 0 0 0       PHQ-9  English  PHQ-9   Any Language  REGINA-7  Suicide Assessment Five-step Evaluation and Treatment (SAFE-T)    Today's PHQ-2 Score:   PHQ-2 ( 1999 [employer]) 9/29/2015 5/8/2013   Q1: Little interest or pleasure in doing things 0 0   Q2: Feeling down, depressed or hopeless 0 0   PHQ-2 Score 0 0       Abuse: Current or Past(Physical, Sexual or Emotional)- No  Do you feel safe in your environment - Yes    Social History   Substance Use Topics     Smoking status: Never Smoker     Smokeless tobacco: Never Used     Alcohol use No     If you drink alcohol do you typically have >3 drinks per day or >7 drinks per week? No                     Reviewed orders with patient.  Reviewed health maintenance and updated orders accordingly - Yes  Labs reviewed in [facility]    Patient over age 50, mutual decision to screen reflected in health maintenance.    Pertinent mammograms are reviewed under the imaging tab.  History of abnormal Pap smear: YES - ALEJANDRO 2/3 on biopsy - PAP/HPV co-testing at 12, 24 months.  If two  "negative results repeat co-testing in 3 years, if negative then routine screening.  PAP / HPV Latest Ref Rng & Units 8/23/2017 8/3/2016 6/3/2015   PAP - NIL NIL NIL   HPV 16 DNA NEG:Negative Negative Negative Negative   HPV 18 DNA NEG:Negative Negative Negative Negative   OTHER HR HPV NEG:Negative Negative Negative Positive(A)   HPV HIGH RISK DNA PROBE - - - -     Reviewed and updated as needed this visit by clinical staff         Reviewed and updated as needed this visit by Provider        Past Medical History:   Diagnosis Date     Carcinoma in situ of cervix uteri 5/19/2009     Dysthymic disorder 8/8/2001     Other acne 1/10/2002     Panic disorder without agoraphobia 1/3/2011     Papanicolaou smear of cervix with high grade squamous intraepithelial lesion (HGSIL) 4/27/2009     Unspecified sinusitis (chronic) 6/5/2003      Past Surgical History:   Procedure Laterality Date     ------------OTHER-------------  2009    LEEP     ------------OTHER-------------  2005    IUD     BIOPSY BREAST      breast lump     BUNIONECTOMY  2010       ROS:  CONSTITUTIONAL: NEGATIVE for fever, chills, change in weight  INTEGUMENTARU/SKIN: NEGATIVE for worrisome rashes, moles or lesions  EYES: NEGATIVE for vision changes or irritation  ENT: NEGATIVE for ear, mouth and throat problems  RESP: NEGATIVE for significant cough or SOB  BREAST: NEGATIVE for masses, tenderness or discharge  CV: NEGATIVE for chest pain, palpitations or peripheral edema  GI: NEGATIVE for nausea, abdominal pain, heartburn, or change in bowel habits  : NEGATIVE for unusual urinary or vaginal symptoms. Periods are regular.  MUSCULOSKELETAL: NEGATIVE for significant arthralgias or myalgia  NEURO: NEGATIVE for weakness, dizziness or paresthesias  PSYCHIATRIC: NEGATIVE for changes in mood or affect    OBJECTIVE:   /70  Pulse 65  Ht 5' 3.5\" (1.613 m)  Wt 112 lb (50.8 kg)  SpO2 99%  BMI 19.53 kg/m2  EXAM:  GENERAL: healthy, alert and no distress  EYES: Eyes " grossly normal to inspection, PERRL and conjunctivae and sclerae normal  HENT: ear canals and TM's normal, nose and mouth without ulcers or lesions  NECK: no adenopathy, no asymmetry, masses, or scars and thyroid normal to palpation  RESP: lungs clear to auscultation - no rales, rhonchi or wheezes  BREAST: normal without masses, tenderness or nipple discharge and no palpable axillary masses or adenopathy  CV: regular rate and rhythm, normal S1 S2, no S3 or S4, no murmur, click or rub, no peripheral edema and peripheral pulses strong  ABDOMEN: soft, nontender, no hepatosplenomegaly, no masses and bowel sounds normal   (female): normal female external genitalia, normal urethral meatus, vaginal mucosa pink, moist, well rugated, and normal cervix/adnexa/uterus without masses or discharge  MS: no gross musculoskeletal defects noted, no edema  SKIN: no suspicious lesions or rashes  NEURO: Normal strength and tone, mentation intact and speech normal  PSYCH: mentation appears normal, affect normal/bright    Results for orders placed or performed in visit on 09/14/18 (from the past 24 hour(s))   Comprehensive metabolic panel   Result Value Ref Range    Sodium 140 133 - 144 mmol/L    Potassium 3.6 3.4 - 5.3 mmol/L    Chloride 105 94 - 109 mmol/L    Carbon Dioxide 29 20 - 32 mmol/L    Anion Gap 6 3 - 14 mmol/L    Glucose 79 70 - 99 mg/dL    Urea Nitrogen 12 7 - 30 mg/dL    Creatinine 0.85 0.52 - 1.04 mg/dL    GFR Estimate 71 >60 mL/min/1.7m2    GFR Estimate If Black 85 >60 mL/min/1.7m2    Calcium 9.5 8.5 - 10.1 mg/dL    Bilirubin Total 0.5 0.2 - 1.3 mg/dL    Albumin 4.4 3.4 - 5.0 g/dL    Protein Total 8.0 6.8 - 8.8 g/dL    Alkaline Phosphatase 83 40 - 150 U/L    ALT 20 0 - 50 U/L    AST 24 0 - 45 U/L   CBC with platelets and differential   Result Value Ref Range    WBC 4.5 4.0 - 11.0 10e9/L    RBC Count 4.74 3.8 - 5.2 10e12/L    Hemoglobin 14.9 11.7 - 15.7 g/dL    Hematocrit 43.2 35.0 - 47.0 %    MCV 91 78 - 100 fl    MCH  31.4 26.5 - 33.0 pg    MCHC 34.5 31.5 - 36.5 g/dL    RDW 11.9 10.0 - 15.0 %    Platelet Count 238 150 - 450 10e9/L    Diff Method Automated Method     % Neutrophils 57.9 %    % Lymphocytes 27.8 %    % Monocytes 9.9 %    % Eosinophils 3.5 %    % Basophils 0.7 %    % Immature Granulocytes 0.2 %    Nucleated RBCs 0 0 /100    Absolute Neutrophil 2.6 1.6 - 8.3 10e9/L    Absolute Lymphocytes 1.3 0.8 - 5.3 10e9/L    Absolute Monocytes 0.5 0.0 - 1.3 10e9/L    Absolute Eosinophils 0.2 0.0 - 0.7 10e9/L    Absolute Basophils 0.0 0.0 - 0.2 10e9/L    Abs Immature Granulocytes 0.0 0 - 0.4 10e9/L    Absolute Nucleated RBC 0.0          ASSESSMENT/PLAN:   1. Health examination of defined subpopulation  Very healthy and active. See in a year   - Comprehensive metabolic panel; Future  - CBC with platelets and differential; Future  - EKG 12-lead complete w/read - (Clinic Performed); Future  - Comprehensive metabolic panel  - CBC with platelets and differential  - EKG 12-lead complete w/read - (Clinic Performed)  - HPV High Risk Types DNA Cervical    2. Breast cancer screening, high risk patient  Higher risk - mammogram ordered     3. High risk HPV infection  Discussed. Hard time following Dr Shirley repeat paps since leep. Discussed.  Did have Other HR HPV in 2015.After long discussion- will do pap today and then move them out to 3 yrs if normal   - A pap thin layer screen with  HPV - recommended age 30 - 65 years (select HPV order below)  - HPV High Risk Types DNA Cervical    4. Depression with anxiety  Stable - needs long term meds     5. Special screening for malignant neoplasms, colon  Discussed. Referral sent   - EKG 12-lead complete w/read - (Clinic Performed); Future  - EKG 12-lead complete w/read - (Clinic Performed)  - GENERAL SURG ADULT REFERRAL    6. Family history of breast cancer in sister  Mammogram ordered  - MA Screening Digital Bilateral; Future    7. Encounter for screening mammogram for breast cancer  As above   - MA  "Screening Digital Bilateral; Future    8. Panic disorder without agoraphobia  Stable- needs long term meds   - PARoxetine (PAXIL) 10 MG tablet; Take 1 tablet (10 mg) by mouth daily  Dispense: 90 tablet; Refill: 4    9. History of cervical dysplasia  As above. Space pap out ot 3 yrs if this one ok   - A pap thin layer screen with  HPV - recommended age 30 - 65 years (select HPV order below)  - HPV High Risk Types DNA Cervical    COUNSELING:   Reviewed preventive health counseling, as reflected in patient instructions       Regular exercise       Healthy diet/nutrition       Colon cancer screening    BP Readings from Last 1 Encounters:   09/13/17 112/70     Estimated body mass index is 19.4 kg/(m^2) as calculated from the following:    Height as of 9/13/17: 5' 4\" (1.626 m).    Weight as of 9/13/17: 113 lb (51.3 kg).           reports that she has never smoked. She has never used smokeless tobacco.      Counseling Resources:  ATP IV Guidelines  Pooled Cohorts Equation Calculator  Breast Cancer Risk Calculator  FRAX Risk Assessment  ICSI Preventive Guidelines  Dietary Guidelines for Americans, 2010  USDA's MyPlate  ASA Prophylaxis  Lung CA Screening    Carlos Hart MD  [facility] [place]  "

## 2018-10-03 ENCOUNTER — ANESTHESIA EVENT (OUTPATIENT)
Dept: SURGERY | Facility: HOSPITAL | Age: 48
End: 2018-10-03
Payer: COMMERCIAL

## 2018-10-03 NOTE — ANESTHESIA PREPROCEDURE EVALUATION
Anesthesia Evaluation     . Pt has had prior anesthetic.            ROS/MED HX    ENT/Pulmonary:  - neg pulmonary ROS     Neurologic:  - neg neurologic ROS     Cardiovascular:  - neg cardiovascular ROS       METS/Exercise Tolerance:     Hematologic:  - neg hematologic  ROS       Musculoskeletal:  - neg musculoskeletal ROS       GI/Hepatic:     (+) bowel prep,       Renal/Genitourinary:     (+) chronic renal disease (Stage 2 (mild)), type: CRI,       Endo:  - neg endo ROS       Psychiatric:     (+) psychiatric history other (comment), anxiety and depression (Panic d/o)      Infectious Disease:  - neg infectious disease ROS       Malignancy:   (+) Malignancy History of Other  Other CA Cervical CA status post         Other:    (+) No chance of pregnancy   - neg other ROS                 Physical Exam  Normal systems: cardiovascular, pulmonary and dental    Airway   Mallampati: III  TM distance: >3 FB  Neck ROM: full    Dental     Cardiovascular   Rhythm and rate: regular and normal      Pulmonary    breath sounds clear to auscultation                    Anesthesia Plan      History & Physical Review  History and physical reviewed and following examination; no interval change.    ASA Status:  2 .    NPO Status:  > 8 hours    Plan for MAC with Intravenous and Propofol induction. Maintenance will be TIVA.  Reason for MAC:  Extreme anxiety (QS) and Other - see comments  PONV prophylaxis:  Ondansetron (or other 5HT-3)  HCG Negative  Surgeon requests deep sedation. Patient has an anxiety d/o treated with anxiolytics. Will provide MAC.      Postoperative Care  Postoperative pain management:  IV analgesics.      Consents  Anesthetic plan, risks, benefits and alternatives discussed with:  Patient..                          .

## 2018-10-04 ENCOUNTER — SURGERY (OUTPATIENT)
Age: 48
End: 2018-10-04

## 2018-10-04 ENCOUNTER — HOSPITAL ENCOUNTER (OUTPATIENT)
Facility: HOSPITAL | Age: 48
Discharge: HOME | End: 2018-10-04
Attending: SURGERY | Admitting: SURGERY
Payer: COMMERCIAL

## 2018-10-04 ENCOUNTER — APPOINTMENT (OUTPATIENT)
Dept: LAB | Facility: HOSPITAL | Age: 48
End: 2018-10-04
Attending: SURGERY
Payer: COMMERCIAL

## 2018-10-04 ENCOUNTER — ANESTHESIA (OUTPATIENT)
Dept: SURGERY | Facility: HOSPITAL | Age: 48
End: 2018-10-04
Payer: COMMERCIAL

## 2018-10-04 VITALS
DIASTOLIC BLOOD PRESSURE: 84 MMHG | TEMPERATURE: 97.4 F | SYSTOLIC BLOOD PRESSURE: 118 MMHG | RESPIRATION RATE: 18 BRPM | OXYGEN SATURATION: 99 %

## 2018-10-04 LAB — HCG UR QL: NEGATIVE

## 2018-10-04 PROCEDURE — 88305 TISSUE EXAM BY PATHOLOGIST: CPT | Mod: TC | Performed by: SURGERY

## 2018-10-04 PROCEDURE — 71000027 ZZH RECOVERY PHASE 2 EACH 15 MINS: Performed by: SURGERY

## 2018-10-04 PROCEDURE — 37000009 ZZH ANESTHESIA TECHNICAL FEE, EACH ADDTL 15 MIN: Performed by: SURGERY

## 2018-10-04 PROCEDURE — 27210794 ZZH OR GENERAL SUPPLY STERILE: Performed by: SURGERY

## 2018-10-04 PROCEDURE — 01999 UNLISTED ANES PROCEDURE: CPT | Performed by: NURSE ANESTHETIST, CERTIFIED REGISTERED

## 2018-10-04 PROCEDURE — 36000052 ZZH SURGERY LEVEL 2 EA 15 ADDTL MIN: Performed by: SURGERY

## 2018-10-04 PROCEDURE — 40000305 ZZH STATISTIC PRE PROC ASSESS I: Performed by: SURGERY

## 2018-10-04 PROCEDURE — 81025 URINE PREGNANCY TEST: CPT | Performed by: ANESTHESIOLOGY

## 2018-10-04 PROCEDURE — 25000128 H RX IP 250 OP 636: Performed by: NURSE ANESTHETIST, CERTIFIED REGISTERED

## 2018-10-04 PROCEDURE — 45380 COLONOSCOPY AND BIOPSY: CPT | Mod: PT | Performed by: SURGERY

## 2018-10-04 PROCEDURE — 25000128 H RX IP 250 OP 636: Performed by: ANESTHESIOLOGY

## 2018-10-04 PROCEDURE — 36000050 ZZH SURGERY LEVEL 2 1ST 30 MIN: Performed by: SURGERY

## 2018-10-04 PROCEDURE — 37000008 ZZH ANESTHESIA TECHNICAL FEE, 1ST 30 MIN: Performed by: SURGERY

## 2018-10-04 PROCEDURE — 25000125 ZZHC RX 250: Performed by: NURSE ANESTHETIST, CERTIFIED REGISTERED

## 2018-10-04 PROCEDURE — 45385 COLONOSCOPY W/LESION REMOVAL: CPT | Performed by: ANESTHESIOLOGY

## 2018-10-04 RX ORDER — LIDOCAINE 40 MG/G
CREAM TOPICAL
Status: DISCONTINUED | OUTPATIENT
Start: 2018-10-04 | End: 2018-10-04 | Stop reason: HOSPADM

## 2018-10-04 RX ORDER — ALBUTEROL SULFATE 0.83 MG/ML
2.5 SOLUTION RESPIRATORY (INHALATION) EVERY 4 HOURS PRN
Status: DISCONTINUED | OUTPATIENT
Start: 2018-10-04 | End: 2018-10-04 | Stop reason: HOSPADM

## 2018-10-04 RX ORDER — KETOROLAC TROMETHAMINE 30 MG/ML
30 INJECTION, SOLUTION INTRAMUSCULAR; INTRAVENOUS EVERY 6 HOURS PRN
Status: DISCONTINUED | OUTPATIENT
Start: 2018-10-04 | End: 2018-10-04 | Stop reason: HOSPADM

## 2018-10-04 RX ORDER — FENTANYL CITRATE 50 UG/ML
25-50 INJECTION, SOLUTION INTRAMUSCULAR; INTRAVENOUS
Status: DISCONTINUED | OUTPATIENT
Start: 2018-10-04 | End: 2018-10-04 | Stop reason: HOSPADM

## 2018-10-04 RX ORDER — HYDRALAZINE HYDROCHLORIDE 20 MG/ML
2.5-5 INJECTION INTRAMUSCULAR; INTRAVENOUS EVERY 10 MIN PRN
Status: DISCONTINUED | OUTPATIENT
Start: 2018-10-04 | End: 2018-10-04 | Stop reason: HOSPADM

## 2018-10-04 RX ORDER — NALOXONE HYDROCHLORIDE 0.4 MG/ML
.1-.4 INJECTION, SOLUTION INTRAMUSCULAR; INTRAVENOUS; SUBCUTANEOUS
Status: DISCONTINUED | OUTPATIENT
Start: 2018-10-04 | End: 2018-10-04 | Stop reason: HOSPADM

## 2018-10-04 RX ORDER — ONDANSETRON 4 MG/1
4 TABLET, ORALLY DISINTEGRATING ORAL EVERY 30 MIN PRN
Status: DISCONTINUED | OUTPATIENT
Start: 2018-10-04 | End: 2018-10-04 | Stop reason: HOSPADM

## 2018-10-04 RX ORDER — DEXAMETHASONE SODIUM PHOSPHATE 4 MG/ML
4 INJECTION, SOLUTION INTRA-ARTICULAR; INTRALESIONAL; INTRAMUSCULAR; INTRAVENOUS; SOFT TISSUE EVERY 10 MIN PRN
Status: DISCONTINUED | OUTPATIENT
Start: 2018-10-04 | End: 2018-10-04 | Stop reason: HOSPADM

## 2018-10-04 RX ORDER — SODIUM CHLORIDE, SODIUM LACTATE, POTASSIUM CHLORIDE, CALCIUM CHLORIDE 600; 310; 30; 20 MG/100ML; MG/100ML; MG/100ML; MG/100ML
INJECTION, SOLUTION INTRAVENOUS CONTINUOUS
Status: DISCONTINUED | OUTPATIENT
Start: 2018-10-04 | End: 2018-10-04 | Stop reason: HOSPADM

## 2018-10-04 RX ORDER — FLUMAZENIL 0.1 MG/ML
0.2 INJECTION, SOLUTION INTRAVENOUS
Status: DISCONTINUED | OUTPATIENT
Start: 2018-10-04 | End: 2018-10-04 | Stop reason: HOSPADM

## 2018-10-04 RX ORDER — PROPOFOL 10 MG/ML
INJECTION, EMULSION INTRAVENOUS PRN
Status: DISCONTINUED | OUTPATIENT
Start: 2018-10-04 | End: 2018-10-04

## 2018-10-04 RX ORDER — MEPERIDINE HYDROCHLORIDE 50 MG/ML
12.5 INJECTION INTRAMUSCULAR; INTRAVENOUS; SUBCUTANEOUS
Status: DISCONTINUED | OUTPATIENT
Start: 2018-10-04 | End: 2018-10-04 | Stop reason: HOSPADM

## 2018-10-04 RX ORDER — ONDANSETRON 2 MG/ML
4 INJECTION INTRAMUSCULAR; INTRAVENOUS EVERY 30 MIN PRN
Status: DISCONTINUED | OUTPATIENT
Start: 2018-10-04 | End: 2018-10-04 | Stop reason: HOSPADM

## 2018-10-04 RX ORDER — LIDOCAINE HYDROCHLORIDE 20 MG/ML
INJECTION, SOLUTION INFILTRATION; PERINEURAL PRN
Status: DISCONTINUED | OUTPATIENT
Start: 2018-10-04 | End: 2018-10-04

## 2018-10-04 RX ADMIN — PROPOFOL 30 MG: 10 INJECTION, EMULSION INTRAVENOUS at 13:28

## 2018-10-04 RX ADMIN — PROPOFOL 50 MG: 10 INJECTION, EMULSION INTRAVENOUS at 13:04

## 2018-10-04 RX ADMIN — PROPOFOL 50 MG: 10 INJECTION, EMULSION INTRAVENOUS at 13:15

## 2018-10-04 RX ADMIN — LIDOCAINE HYDROCHLORIDE 40 MG: 20 INJECTION, SOLUTION INFILTRATION; PERINEURAL at 12:59

## 2018-10-04 RX ADMIN — PROPOFOL 50 MG: 10 INJECTION, EMULSION INTRAVENOUS at 12:59

## 2018-10-04 RX ADMIN — SODIUM CHLORIDE, POTASSIUM CHLORIDE, SODIUM LACTATE AND CALCIUM CHLORIDE: 600; 310; 30; 20 INJECTION, SOLUTION INTRAVENOUS at 12:45

## 2018-10-04 RX ADMIN — PROPOFOL 50 MG: 10 INJECTION, EMULSION INTRAVENOUS at 13:10

## 2018-10-04 RX ADMIN — PROPOFOL 50 MG: 10 INJECTION, EMULSION INTRAVENOUS at 13:24

## 2018-10-04 RX ADMIN — PROPOFOL 50 MG: 10 INJECTION, EMULSION INTRAVENOUS at 13:00

## 2018-10-04 NOTE — IP AVS SNAPSHOT
[facility] Operating Room    [address] [place] [id number]    Phone:  [phone]                                       After Visit Summary   10/4/2018    Kacy Valdez    MRN: [id number]           After Visit Summary Signature Page     I have received my discharge instructions, and my questions have been answered. I have discussed any challenges I see with this plan with the nurse or doctor.    ..........................................................................................................................................  Patient/Patient Representative Signature      ..........................................................................................................................................  Patient Representative Print Name and Relationship to Patient    ..................................................               ................................................  Date                                   Time    ..........................................................................................................................................  Reviewed by Signature/Title    ...................................................              ..............................................  Date                                               Time          22EPIC Rev 08/18

## 2018-10-04 NOTE — OR NURSING
Took juice and cookies w/o nausea.States feels better.Patient and responsible adult given discharge instructions with no questions regarding instructions. Jesse score 20. Pain level 0/10.  Discharged from unit via walking. Patient discharged to home.

## 2018-10-04 NOTE — ANESTHESIA CARE TRANSFER NOTE
Patient: Kacy METCALF Courtney    Procedure(s):  COLONSCOPY with Polypectomy - Wound Class: IV-Dirty or Infected    Diagnosis: SCREENING  Diagnosis Additional Information: No value filed.    Anesthesia Type:   MAC     Note:  Airway :Nasal Cannula  Patient transferred to:Phase II  Handoff Report: Identifed the Patient, Identified the Reponsible Provider, Reviewed the pertinent medical history, Discussed the surgical course, Reviewed Intra-OP anesthesia mangement and issues during anesthesia, Set expectations for post-procedure period and Allowed opportunity for questions and acknowledgement of understanding      Vitals: (Last set prior to Anesthesia Care Transfer)    CRNA VITALS  10/4/2018 1306 - 10/4/2018 1339      10/4/2018             Resp Rate (set): 8                Electronically Signed By: DANIEL Garcia CRNA  October 4, 2018  1:39 PM

## 2018-10-04 NOTE — IP AVS SNAPSHOT
MRN:[id number]                      After Visit Summary   10/4/2018    Kacy Valdez    MRN: [id number]           Thank you!     Thank you for choosing [facility] for your care. Our goal is always to provide you with excellent care. Hearing back from our patients is one way we can continue to improve our services. Please take a few minutes to complete the written survey that you may receive in the mail after you visit with us. Thank you!        Patient Information     Date Of Birth          1970        About your hospital stay     You were admitted on:  October 4, 2018 You last received care in the:  [facility]    You were discharged on:  October 4, 2018       Who to Call     For medical emergencies, please call 911.  For non-urgent questions about your medical care, please call your primary care provider or clinic, [phone]  For questions related to your surgery, please call your surgery clinic        Attending Provider     Provider Jese Loredo MD General Surgery       Primary Care Provider Office Phone # Fax #    Carlos Hart [phone]      After Care Instructions     Discharge Instructions       Resume pre procedure diet            Discharge Instructions       Restart home medications.                  Further instructions from your care team           INSTRUCTIONS AFTER COLONOSCOPY    WHEN YOU ARE BACK HOME:    Plan to rest for an hour or two after you get home.    You may have some cramping or pressure until you pass gas.    You may resume your regular medications.    Eat a small, light meal at first, and then gradually return to normal meal sizes.  If you had a polyp removed:    Slight bleeding may occur.  You may have a slight blood stain on the toilet paper after a bowel movement.    To lessen the chance of bleeding, avoid heavy exercise for ONE WEEK.  This includes heavy lifting, vigorous sport activities, and heavy physical labor.   You may resume your normal sexual activity.      Avoid aspirin or aspirin products if instructed by your doctor.    WHAT TO WATCH FOR:  Problems rarely occur after the exam; however, it is important for you to watch for early signs of possible problems.  If you have     Unusual pain in your abdomen    Nausea and vomiting that persists    Excessive bleeding    Black or bloody bowel movements    Fever or temperature above 100.6 F  Please call your doctor ([facility] [phone]) or go to the nearest hospital emergency room.    Post-Anesthesia Patient Instructions    IMMEDIATELY FOLLOWING SURGERY:  Do not drive or operate machinery for the first twenty four hours after surgery.  Do not make any important decisions for twenty four hours after surgery or while taking narcotic pain medications or sedatives.  If you develop intractable nausea and vomiting or a severe headache please notify your doctor immediately.    FOLLOW-UP:  Please make an appointment with your surgeon as instructed. You do not need to follow up with anesthesia unless specifically instructed to do so.    WOUND CARE INSTRUCTIONS (if applicable):  Keep a dry clean dressing on the anesthesia/puncture wound site if there is drainage.  Once the wound has quit draining you may leave it open to air.  Generally you should leave the bandage intact for twenty four hours unless there is drainage.  If the epidural site drains for more than 36-48 hours please call the anesthesia department.    QUESTIONS?:  Please feel free to call your physician or the hospital  if you have any questions, and they will be happy to assist you.       Pending Results     No orders found from 10/2/2018 to 10/5/2018.            Admission Information     Date & Time Provider Department Dept. Phone    10/4/2018 Jese Rizzo MD [facility] [phone]      Your Vitals Were     Blood Pressure Temperature Respirations Pulse Oximetry          106/65 99.1  F (37.3  " C) (Tympanic) 18 98%        [employer] Information     Between lets you send messages to your doctor, view your test results, renew your prescriptions, schedule appointments and more. To sign up, go to [email or web address] . Click on \"Log in\" on the left side of the screen, which will take you to the Welcome page. Then click on \"Sign up Now\" on the right side of the page.     You will be asked to enter the access code listed below, as well as some personal information. Please follow the directions to create your username and password.     Your access code is: [email or web address]  Expires: 2018  8:37 AM     Your access code will  in 90 days. If you need help or a new code, please call your [facility] clinic or [phone].        Care EveryWhere ID     This is your Care EveryWhere ID. This could be used by other organizations to access your [facility] medical records  [id number]        Equal Access to Services     RISHI BUTLER : Hadii jair florence hadasho Solennieali, waaxda luqadaha, qaybta kaalmada adeegyada, marita haynes . So [facility] [phone].    ATENCIÓN: Si habla español, tiene a caceres disposición servicios gratuitos de asistencia lingüística. Yamile al [phone].    We comply with applicable federal civil rights laws and [place] laws. We do not discriminate on the basis of race, color, national origin, age, disability, sex, sexual orientation, or gender identity.               Review of your medicines      CONTINUE these medicines which have NOT CHANGED        Dose / Directions    EPINEPHrine 0.3 MG/0.3ML injection 2-pack   Commonly known as:  EPIPEN/ADRENACLICK/or ANY BX GENERIC EQUIV   Used for:  Nut allergy        Dose:  0.3 mg   Inject 0.3 mLs (0.3 mg) into the muscle as needed for anaphylaxis   Quantity:  0.6 mL   Refills:  3       levonorgestrel 20 MCG/24HR IUD   Commonly known as:  MIRENA   Used for:  Family planning        Dose:  1 each   1 each (20 mcg) by Intrauterine route " continuous   Refills:  0       nystatin-triamcinolone cream   Commonly known as:  MYCOLOG II   Used for:  Vaginitis and vulvovaginitis, unspecified, Routine gynecological examination        Apply small amt to area nightly   Quantity:  100 g   Refills:  6       PARoxetine 10 MG tablet   Commonly known as:  PAXIL   Used for:  Panic disorder without agoraphobia        Dose:  10 mg   Take 1 tablet (10 mg) by mouth daily   Quantity:  90 tablet   Refills:  4                Protect others around you: Learn how to safely use, store and throw away your medicines at www.disposemymeds.org.             Medication List: This is a list of all your medications and when to take them. Check marks below indicate your daily home schedule. Keep this list as a reference.      Medications           Morning Afternoon Evening Bedtime As Needed    EPINEPHrine 0.3 MG/0.3ML injection 2-pack   Commonly known as:  EPIPEN/ADRENACLICK/or ANY BX GENERIC EQUIV   Inject 0.3 mLs (0.3 mg) into the muscle as needed for anaphylaxis                                levonorgestrel 20 MCG/24HR IUD   Commonly known as:  MIRENA   1 each (20 mcg) by Intrauterine route continuous                                nystatin-triamcinolone cream   Commonly known as:  MYCOLOG II   Apply small amt to area nightly                                PARoxetine 10 MG tablet   Commonly known as:  PAXIL   Take 1 tablet (10 mg) by mouth daily

## 2018-10-04 NOTE — DISCHARGE INSTRUCTIONS
INSTRUCTIONS AFTER COLONOSCOPY    WHEN YOU ARE BACK HOME:    Plan to rest for an hour or two after you get home.    You may have some cramping or pressure until you pass gas.    You may resume your regular medications.    Eat a small, light meal at first, and then gradually return to normal meal sizes.  If you had a polyp removed:    Slight bleeding may occur.  You may have a slight blood stain on the toilet paper after a bowel movement.    To lessen the chance of bleeding, avoid heavy exercise for ONE WEEK.  This includes heavy lifting, vigorous sport activities, and heavy physical labor.  You may resume your normal sexual activity.      Avoid aspirin or aspirin products if instructed by your doctor.    WHAT TO WATCH FOR:  Problems rarely occur after the exam; however, it is important for you to watch for early signs of possible problems.  If you have     Unusual pain in your abdomen    Nausea and vomiting that persists    Excessive bleeding    Black or bloody bowel movements    Fever or temperature above 100.6 F  Please call your doctor ([facility] [phone]) or go to the nearest hospital emergency room.    Post-Anesthesia Patient Instructions    IMMEDIATELY FOLLOWING SURGERY:  Do not drive or operate machinery for the first twenty four hours after surgery.  Do not make any important decisions for twenty four hours after surgery or while taking narcotic pain medications or sedatives.  If you develop intractable nausea and vomiting or a severe headache please notify your doctor immediately.    FOLLOW-UP:  Please make an appointment with your surgeon as instructed. You do not need to follow up with anesthesia unless specifically instructed to do so.    WOUND CARE INSTRUCTIONS (if applicable):  Keep a dry clean dressing on the anesthesia/puncture wound site if there is drainage.  Once the wound has quit draining you may leave it open to air.  Generally you should leave the bandage intact for twenty four  hours unless there is drainage.  If the epidural site drains for more than 36-48 hours please call the anesthesia department.    QUESTIONS?:  Please feel free to call your physician or the hospital  if you have any questions, and they will be happy to assist you.

## 2018-10-04 NOTE — ANESTHESIA POSTPROCEDURE EVALUATION
Patient: Kacy METCALF Courtney    Procedure(s):  COLONSCOPY with Polypectomy - Wound Class: IV-Dirty or Infected    Diagnosis:SCREENING  Diagnosis Additional Information: No value filed.    Anesthesia Type:  MAC    Note:  Anesthesia Post Evaluation    Patient location during evaluation: Phase 2 and Bedside  Patient participation: Able to fully participate in evaluation  Level of consciousness: awake and alert  Pain management: adequate  Airway patency: patent  Cardiovascular status: acceptable  Respiratory status: acceptable  Hydration status: stable  PONV: none     Anesthetic complications: None          Last vitals:  Vitals:    10/04/18 1430 10/04/18 1435 10/04/18 1440   BP: 121/81 142/96 118/84   Resp: 18 18 18   Temp:   97.4  F (36.3  C)   SpO2: 98% 99% 99%         Electronically Signed By: Silverio Strickland MD  October 4, 2018  2:57 PM

## 2018-10-04 NOTE — BRIEF OP NOTE
[facility] - Brief Operative Note                Pre-operative diagnosis:                     SCREENING   Post-operative diagnosis Colon polyp x1    Procedure: Procedure(s):  COLONSCOPY with Polypectomy - Wound Class: IV-Dirty or Infected   Surgeon: Jese Rizzo MD,    Anesthesia: Monitor Anesthesia Care    Estimated blood loss: * No values recorded between 10/4/2018  1:01 PM and 10/4/2018  1:34 PM *   Blood transfusion: No transfusion was given during surgery   Drains: None    Specimens:   ID Type Source Tests Collected by Time Destination   A :  Polyp Large Intestine, Cecum SURGICAL PATHOLOGY EXAM Jese Rizzo MD 10/4/2018  1:22 PM       Findings: Cecum polyp    Complications: None   Condition: Stable   Comments: Details included in dictated operative note.

## 2018-10-05 LAB — COPATH REPORT: NORMAL

## 2018-10-05 NOTE — OP NOTE
Kacy Valdez MRN# [id number]   YOB: 1970 Age: 48 year old      Date of Admission:  10/4/2018  Date of Service:   10/04/18    Primary care provider: Carlos Hart    PREOPERATIVE DIAGNOSIS:  Screening colonoscopy        POSTOPERATIVE DIAGNOSIS:  Colon Polyp x 1          PROCEDURE:  Colonoscopy  w/ polypectomy          INDICATIONS:  Screening colonoscopy.      Specimen:   ID Type Source Tests Collected by Time Destination   A :  Polyp Large Intestine, Cecum SURGICAL PATHOLOGY EXAM Jese Rizzo MD 10/4/2018  1:22 PM        SURGEON: Jese Rizzo    DESCRIPTION OF PROCEDURE: Kacy Valdez was brought into the endoscopy suite and placed in the left lateral decubitus position. After preprocedural pause and attended monitored anesthesia was administered, the external anus was inspected and was normal . Digital rectal exam was normal. The colonoscope was inserted and advanced under direct visualization to the level of the cecum which was identified by the appendiceal orifice and the ileocecal valve. The prep was excellent. Upon slow withdrawal of the colonoscope, approximately 95% of the mucosa was directly visualized. The colon was quite tortuous. There was a polyp near the appendiceal orifice that was removed with biopsy forceps.  The rest of the colon was without mucosal abnormality. There was no evidence of further polyps, inflammation, bleeding or AVMs. Retroflexion of the rectum was normal. The extra air was removed from the colon, and the colonoscope withdrawn. The patient tolerated the procedure well and was taken to postanesthesia care unit.     We invite the patient to return in 5-10 years for follow up screening evaluation, pending pathology related to cecal polyp.    Jese Rizzo

## 2018-11-19 ENCOUNTER — ALLIED HEALTH/NURSE VISIT (OUTPATIENT)
Dept: ALLERGY | Facility: OTHER | Age: 48
End: 2018-11-19
Attending: FAMILY MEDICINE
Payer: COMMERCIAL

## 2018-11-19 DIAGNOSIS — Z23 NEED FOR PROPHYLACTIC VACCINATION AND INOCULATION AGAINST INFLUENZA: Primary | ICD-10-CM

## 2018-11-19 PROCEDURE — 90471 IMMUNIZATION ADMIN: CPT

## 2018-11-19 PROCEDURE — 90682 RIV4 VACC RECOMBINANT DNA IM: CPT

## 2018-11-19 NOTE — PROGRESS NOTES

## 2018-11-19 NOTE — MR AVS SNAPSHOT
After Visit Summary   11/19/2018    Kacy Valdez    MRN: [id number]           Patient Information     Date Of Birth          1970        Visit Information        Provider Department      11/19/2018 9:00 AM HC SHOT ROOM [facility] [place]        Today's Diagnoses     Need for prophylactic vaccination and inoculation against influenza    -  1       Follow-ups after your visit        Your next 10 appointments already scheduled     Nov 19, 2018  9:00 AM CST   Flu Shot with HC SHOT ROOM   [facility] [place] ([facility] [place] [facility]    [address]  [place] [address]   [phone]              Who to contact     If you have questions or need follow up information about today's clinic visit or your schedule please contact [facility] directly at [phone].  Normal or non-critical lab and imaging results will be communicated to you by MyChart, letter or phone within 4 business days after the clinic has received the results. If you do not hear from us within 7 days, please contact the clinic through MyChart or phone. If you have a critical or abnormal lab result, we will notify you by phone as soon as possible.  Submit refill requests through [employer] or call your pharmacy and they will forward the refill request to us. Please allow 3 business days for your refill to be completed.          Additional Information About Your Visit        Care EveryWhere ID     This is your Care EveryWhere ID. This could be used by other organizations to access your [facility] medical records  [id number]         Blood Pressure from Last 3 Encounters:   10/04/18 118/84   09/14/18 118/70   09/13/17 112/70    Weight from Last 3 Encounters:   09/14/18 112 lb (50.8 kg)   09/13/17 113 lb (51.3 kg)   08/23/17 113 lb (51.3 kg)              We Performed the Following     C RIV4 (FLUBLOK) VACCINE RECOMBINANT DNA PRSRV ANTIBIO FREE, IM        Primary Care Provider  Office Phone # Fax #    Carlos Hart [phone] [address] [facility] [address]        Equal Access to Services     RISHI BUTLER : Ezekiel jair florence demetria Delvalle, waappleda niru, qamegta kafrankda kristen, marita verde lashelbypatrick karol. So [facility] [phone].    ATENCIÓN: Si habla español, tiene a caceres disposición servicios gratuitos de asistencia lingüística. Llame al [phone].    We comply with applicable federal civil rights laws and [place] laws. We do not discriminate on the basis of race, color, national origin, age, disability, sex, sexual orientation, or gender identity.            Thank you!     Thank you for choosing [facility]  for your care. Our goal is always to provide you with excellent care. Hearing back from our patients is one way we can continue to improve our services. Please take a few minutes to complete the written survey that you may receive in the mail after your visit with us. Thank you!             Your Updated Medication List - Protect others around you: Learn how to safely use, store and throw away your medicines at www.disposemymeds.org.          This list is accurate as of 11/19/18  8:59 AM.  Always use your most recent med list.                   Brand Name Dispense Instructions for use Diagnosis    EPINEPHrine 0.3 MG/0.3ML injection 2-pack    EPIPEN/ADRENACLICK/or ANY BX GENERIC EQUIV    0.6 mL    Inject 0.3 mLs (0.3 mg) into the muscle as needed for anaphylaxis    Nut allergy       levonorgestrel 20 MCG/24HR IUD    MIRENA     1 each (20 mcg) by Intrauterine route continuous    Family planning       nystatin-triamcinolone cream    MYCOLOG II    100 g    Apply small amt to area nightly    Vaginitis and vulvovaginitis, unspecified, Routine gynecological examination       PARoxetine 10 MG tablet    PAXIL    90 tablet    Take 1 tablet (10 mg) by mouth daily    Panic disorder without agoraphobia

## 2018-12-18 ENCOUNTER — OFFICE VISIT (OUTPATIENT)
Dept: FAMILY MEDICINE | Facility: OTHER | Age: 48
End: 2018-12-18
Attending: FAMILY MEDICINE
Payer: COMMERCIAL

## 2018-12-18 VITALS
DIASTOLIC BLOOD PRESSURE: 72 MMHG | BODY MASS INDEX: 20.92 KG/M2 | OXYGEN SATURATION: 98 % | WEIGHT: 120 LBS | HEART RATE: 64 BPM | SYSTOLIC BLOOD PRESSURE: 110 MMHG

## 2018-12-18 DIAGNOSIS — M22.41 CHONDROMALACIA OF PATELLA, RIGHT: ICD-10-CM

## 2018-12-18 DIAGNOSIS — M22.42 CHONDROMALACIA OF PATELLA, LEFT: Primary | ICD-10-CM

## 2018-12-18 DIAGNOSIS — S83.001A SUBLUXATION OF RIGHT PATELLA, INITIAL ENCOUNTER: ICD-10-CM

## 2018-12-18 DIAGNOSIS — S83.002A SUBLUXATION OF LEFT PATELLA, INITIAL ENCOUNTER: ICD-10-CM

## 2018-12-18 PROCEDURE — 99213 OFFICE O/P EST LOW 20 MIN: CPT | Performed by: FAMILY MEDICINE

## 2018-12-18 ASSESSMENT — PAIN SCALES - GENERAL: PAINLEVEL: MILD PAIN (3)

## 2018-12-18 NOTE — PATIENT INSTRUCTIONS
Patient Education     Common Kneecap (Patella) Problems  If the kneecap is  off track  even slightly (a tracking problem), it can cause uneven pressure on the back of the kneecap. This can cause pain and difficulty with movements, such as walking and going down stairs. Below are some common causes of kneecap pain.    Cartilage damage  Sometimes the cartilage on the back of the kneecap or in the groove of the thighbone is damaged. Damaged cartilage can t spread pressure evenly. Uneven pressure wears down the cartilage even further.   Dislocation  Sometimes a muscle or ligament in the knee is pulled the wrong way. Or the kneecap may be pushed too hard. Then the kneecap may move partly out of the groove (subluxation). It may even move completely out (dislocation).     Patellar tendinitis  Patellar tendinitis ( jumper s knee ) happens when the quadriceps muscles are overused or tight. During movement, the patellar tendon absorbs more shock than usual. The tendon becomes irritated or damaged.   Plica syndrome  Plica bands are tissue fibers that some people have near the kneecap. They usually cause no problems. But sometimes they can become irritated or inflamed.   Date Last Reviewed: 5/1/2018 2000-2018 [employer]. [address] [place] [address]. All rights reserved. This information is not intended as a substitute for professional medical care. Always follow your healthcare professional's instructions.           Patient Education     Patella (Kneecap) Dislocation or Subluxation, Reduced  Your kneecap (patella) is held in place by ligaments and tendons. The kneecap can slide to the side of the knee joint if it is hit with a strong force. This sliding is called subluxation or dislocation. In a dislocation, the kneecap moves farther away from its normal position.     Sometimes the kneecap will move back in place by itself. Otherwise, a healthcare provider will have to move it back into place  (reduce it) for you. As a result of this injury, the ligaments and tendons around the kneecap are torn or stretched. It will take about 4 to 6 weeks for these tissues to heal. During this time, the knee must be protected to prevent another injury.  Once a patella dislocation or subluxation has occurred, it is more likely to happen again. This is because the tissues around the kneecap have been weakened. Wear a knee brace or padded shield when playing sports that have a high risk for knee injury. These sports include soccer, basketball, skateboarding, football, skiing, and snowboarding. These devices help support your knee and lower your risk for further injury. An important part of your treatment will be to begin rehabilitation and strengthening exercises as soon as possible.  Home care  Follow these guidelines when caring for yourself at home:    You may be given a knee immobilizer. This will keep you from moving your knee for the first few weeks. Unless otherwise advised, you may take this device off to bathe and sleep. But wear it when you are out of bed, for the prescribed time. Your healthcare provider will often have you wear a knee brace (patellar restraining brace) after you are done with the immobilizer.    If you were not given a knee immobilizer, you can use an elastic tubular knee brace. This will give support while your knee heals. You can buy this kind of brace at [employer]. Use crutches to help you walk, if they were prescribed.    Put an ice pack on the injured area. Do this for 20 minutes every 1 to 2 hours the first day for pain relief. You can make an ice pack by wrapping a plastic bag of ice cubes in a thin towel. Continue using the ice pack 3 to 4 times a day for the next 2 days. Then use the ice pack as needed to ease pain and swelling.    You may use acetaminophen or ibuprofen to control pain, unless another pain medicine was prescribed. If you have chronic liver or kidney disease, talk with  your healthcare provider before using these medicines. Also talk with your provider if you ve had a stomach ulcer or gastrointestinal bleeding.    Don t take part in sports or physical education until your healthcare provider says it s OK to do so. Limit impact activities like walking or bending if they cause pain.  Follow-up care  Follow up with your healthcare provider, or as advised.  If X-rays were taken, a radiologist may look at them. You will be told of any new findings that may affect your care.  When to seek medical advice  Call your healthcare provider right away if any of these occur:    Knee pain or swelling gets worse    You can t bend your knee because of pain or because the joint locks    Redness or warmth over the knee    Pus or fluid drains from any scrape on the knee    You can t put weight on the injured leg because of pain    It feels like your knee is wobbly and might give out   Date Last Reviewed: 5/1/2017 2000-2018 [employer]. [address] [place] [address]. All rights reserved. This information is not intended as a substitute for professional medical care. Always follow your healthcare professional's instructions.

## 2018-12-18 NOTE — NURSING NOTE
"Chief Complaint   Patient presents with     Musculoskeletal Problem       Initial /72 (Patient Position: Sitting)   Pulse 64   Wt 54.4 kg (120 lb)   SpO2 98%   BMI 20.92 kg/m   Estimated body mass index is 20.92 kg/m  as calculated from the following:    Height as of 9/14/18: 1.613 m (5' 3.5\").    Weight as of this encounter: 54.4 kg (120 lb).  Medication Reconciliation: complete    Kandis Mendez LPN  "

## 2019-04-12 ENCOUNTER — OFFICE VISIT (OUTPATIENT)
Dept: FAMILY MEDICINE | Facility: OTHER | Age: 49
End: 2019-04-12
Attending: FAMILY MEDICINE
Payer: COMMERCIAL

## 2019-04-12 VITALS
DIASTOLIC BLOOD PRESSURE: 68 MMHG | WEIGHT: 122 LBS | BODY MASS INDEX: 20.83 KG/M2 | TEMPERATURE: 98.1 F | HEART RATE: 83 BPM | HEIGHT: 64 IN | OXYGEN SATURATION: 99 % | SYSTOLIC BLOOD PRESSURE: 122 MMHG

## 2019-04-12 DIAGNOSIS — S76.012A MUSCLE STRAIN OF LEFT GLUTEAL REGION, INITIAL ENCOUNTER: Primary | ICD-10-CM

## 2019-04-12 PROCEDURE — 99213 OFFICE O/P EST LOW 20 MIN: CPT | Performed by: FAMILY MEDICINE

## 2019-04-12 RX ORDER — NABUMETONE 500 MG/1
500 TABLET, FILM COATED ORAL 2 TIMES DAILY
Qty: 30 TABLET | Refills: 1 | Status: SHIPPED | OUTPATIENT
Start: 2019-04-12 | End: 2020-01-08

## 2019-04-12 ASSESSMENT — MIFFLIN-ST. JEOR: SCORE: 1145.45

## 2019-04-12 ASSESSMENT — PAIN SCALES - GENERAL: PAINLEVEL: MILD PAIN (2)

## 2019-04-12 NOTE — PROGRESS NOTES
"  SUBJECTIVE:   Kacy Valdez is a 49 year old female who presents to clinic today for the following   health issues:      Musculoskeletal problem/pain      Duration: couple months    Description  Location: left gluteus    Intensity:  moderate    Accompanying signs and symptoms: none    History  Previous similar problem: no   Previous evaluation:  none    Precipitating or alleviating factors:  Trauma or overuse: YES- running  Aggravating factors include: sitting, exercise and overuse    Therapies tried and outcome: ice and Ibuprofen    Started with running on ice and her gait/run was off due ice surface    Walking/squat is fine     Walking fast or running- hurts a lot.     Does not radiate- no sciatica            Additional history: as documented    Reviewed  and updated as needed this visit by clinical staff  Tobacco  Allergies  Meds  Med Hx  Surg Hx  Fam Hx  Soc Hx        Reviewed and updated as needed this visit by Provider         Labs reviewed in EPIC    ROS:  CONSTITUTIONAL: NEGATIVE for fever, chills, change in weight  RESP: NEGATIVE for significant cough or SOB  CV: NEGATIVE for chest pain, palpitations or peripheral edema    OBJECTIVE:                                                    /68   Pulse 83   Temp 98.1  F (36.7  C)   Ht 1.613 m (5' 3.5\")   Wt 55.3 kg (122 lb)   SpO2 99%   BMI 21.27 kg/m    Body mass index is 21.27 kg/m .   GENERAL: healthy, alert, well nourished, well hydrated, no distress  MS: extremities- no gross deformities noted, no edema- tender in deep mid left gluteus.  Stressing piriformis- no sx occur.   BACK: no CVA tenderness, no paralumbar tenderness         ASSESSMENT/PLAN:                                                        ICD-10-CM    1. Muscle strain of left gluteal region, initial encounter S76.012A nabumetone (RELAFEN) 500 MG tablet     PHYSICAL THERAPY REFERRAL     Will add Relafen and start PT - see if Marylou can help.  Seem like this is more of a gluteal " muscle injury. Pt has been tried rest and prn Aleve and motrin. Ice and heat discussed. Discussed in length conservative measures of OTC medications for pain, Ice/Heat, elevation and the concept of R.I.C.E.. Continue behavioral changes and proper body mechanics to allow for healing. Follow up as directed.         Carlos Hart MD  [facility] [place]

## 2019-04-12 NOTE — NURSING NOTE
"Chief Complaint   Patient presents with     Pain       Initial /68   Pulse 83   Temp 98.1  F (36.7  C)   Ht 1.613 m (5' 3.5\")   Wt 55.3 kg (122 lb)   SpO2 99%   BMI 21.27 kg/m   Estimated body mass index is 21.27 kg/m  as calculated from the following:    Height as of this encounter: 1.613 m (5' 3.5\").    Weight as of this encounter: 55.3 kg (122 lb).  Medication Reconciliation: complete    Shaw Fu LPN  "

## 2019-04-24 ENCOUNTER — HOSPITAL ENCOUNTER (OUTPATIENT)
Dept: PHYSICAL THERAPY | Facility: HOSPITAL | Age: 49
Setting detail: THERAPIES SERIES
End: 2019-04-24
Attending: FAMILY MEDICINE
Payer: COMMERCIAL

## 2019-04-24 DIAGNOSIS — S76.012A MUSCLE STRAIN OF LEFT GLUTEAL REGION, INITIAL ENCOUNTER: ICD-10-CM

## 2019-04-24 PROCEDURE — 97110 THERAPEUTIC EXERCISES: CPT | Mod: GP

## 2019-04-24 PROCEDURE — 97161 PT EVAL LOW COMPLEX 20 MIN: CPT | Mod: GP

## 2019-04-24 NOTE — PROGRESS NOTES
Initial Physical Therapy Evaluation      Name: Kacy Valdez MRN# [id number]   Age: 49 year old YOB: 1970     Date of Consultation: April 24, 2019  Primary care provider: Carlos Hart    Referring Physician: Carlos Hart  Orders: Eval and Treat  Medical Diagnosis: Strain of L Gluteal Region   Onset of Illness/Injury: Jan 15th    Reason for PT Visit: Patient is a 50 y/o female who presents with L hip pain.  Initially injured her L hip or glute while running.  No bruising or discoloration following the injury.  Notes she is able to perform some exercises, but running or walking fast pace, especially up hill will increase her symptoms.  Sitting for an extended period of time also causes her symptoms to increase.   Patient pain increased with forward bend stretch.  Patient has a history of low back.  Patient has gone to the chiropractor since the injury, but it has not been helpful at all in relieving symptoms.   Prior Level of Function: Patient was running 3-4 miles run daily.  Patient has run marathons in the past.    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 No  Have you fallen and had an injury in the last year? No  Timed up & go: N/a  Is patient a fall risk? No    Past Medical History:   Past Medical History:   Diagnosis Date     Carcinoma in situ of cervix uteri 5/19/2009     Dysthymic disorder 8/8/2001     Other acne 1/10/2002     Panic disorder without agoraphobia 1/3/2011     Papanicolaou smear of cervix with high grade squamous intraepithelial lesion (HGSIL)  "4/27/2009     Unspecified sinusitis (chronic) 6/5/2003       Past Surgical History:  Past Surgical History:   Procedure Laterality Date     ------------OTHER-------------  2009    LEEP     ------------OTHER-------------  2005    IUD     BIOPSY BREAST      breast lump     BUNIONECTOMY  2010     COLONOSCOPY N/A 10/4/2018    Procedure: COLONOSCOPY;  COLONSCOPY with Polypectomy;  Surgeon: Jese Rizzo MD;  Location: [facility] OR       Medications:   Current Outpatient Medications   Medication Sig     EPINEPHrine (EPIPEN/ADRENACLICK/OR ANY BX GENERIC EQUIV) 0.3 MG/0.3ML injection 2-pack Inject 0.3 mLs (0.3 mg) into the muscle as needed for anaphylaxis     levonorgestrel (MIRENA) 20 MCG/24HR IUD 1 each (20 mcg) by Intrauterine route continuous     nabumetone (RELAFEN) 500 MG tablet Take 1 tablet (500 mg) by mouth 2 times daily     nystatin-triamcinolone (MYCOLOG II) cream Apply small amt to area nightly     PARoxetine (PAXIL) 10 MG tablet Take 1 tablet (10 mg) by mouth daily     No current facility-administered medications for this encounter.        Imaging:     Musculoskeletal Findings:     OBJECTIVE   Imaging: N/a    Patient goals: \"To return to running.\"    OBJECTIVE    Observation:   Palpation: Tenderness and pain at ischial tuberosity     Gait: Normal   Assistive devices: wheeled walker and no assistive devices    Functional mobility:  Posture: Normal erect.  Sitting Posture:   Standing Posture:  Correction of posture:     Neurological Assessment:  Reflexes - Right:  Patellar: 2+  Achilles: 2+    Reflexes - Left:  Patellar: 2+  Achilles: 2+    Myotomes:  Right lower extremity: Neg  Left lower extremity: Neg    Dermatomes:  Right lower extremity: Neg  Left lower extremity: Neg    Range of Motion:  Active Lumbar Spine:       Flexion: WNL       Extension: WNL       Right sidebend: WNL       Left sidebend: WNL       Right rotation: WNL       Left rotation: WNL    Strength:  Abdominal Strength: 5/5   Right Lower Extremity " Strength: 5/5   Left Lower Extremity Strength: 5/5 except Knee flexion 3/5 due to limitation from pain     Special Tests:    Spine Special Tests:  Slump:    Left: Neg              Right: Neg  Straight Leg Raise:    Left: Neg      Right: Neg  Traction: Neg  Prone Knee Bend:     Left:  neg     Right: neg  Compression:    Left:       Right:  Repeated Movement Testing:     Passive Intervertebral Accessory Movements:     Prone Instability Test:       Hip Special Testss  Roberto:    Neg          VIC: Neg                  Scour Test:   Neg                        Mukesh Test:   Neg                   Leg Length:  Equal              Trendelenburg s Sign:    Neg  HS 90/90 - 0*, no limitations, pain at ischial tuberosity past 90*     Hip ROM:  L LE: WNL    R LE: WNL    Hip Joint Accessory Motion/Glides:    Patient's Concordant Sign: Running, Walking uphill, Forward Bends, Sitting on L side     Outcome Measures:    54/80 on LEFS     Prognosis/Plan of Care: Patient has a good prognosis for the following goals   Appropriate for Physical Therapy Intervention: Yes        GOALS:   Short-term goals:  To be achieved in 2-4 weeks:    Instruct in home program  1.) Patient will understand biomechanical stressors of the hip joint in order to make modifications to activities to reduce further risk of injury.  2.) Patient will be independent with a short-term home exercise program.  3.) Improve knee flexion 5/5 and demonstrate ability to perform forward bend w/o increase in pain at ischial tuberosity     Long-term goals:  To be achieved in 8-10 weeks:    Return to previous level of function  Self management of symptoms.  1. Patient will return to jogging short distance up to 30 min a day demonstrating ability to stop activity when unable to tolerate.   2. Patient will return to running 3-6 miles per day.   3. Patient will be able to sit for her whole shift w/o increase in L hip pain.      Planned Interventions: Therapeutic exercise, manual  therapy, therapeutic activity, patient education, Iontophoresis, Dry Needling      Patient presents today with signs and symptoms consistent of potential HS strain at ischial tuberosity or potentially Ischial bursitis based on symptoms being reproduced in sitting w/ active knee flexion, or end range hip flexion. . Therapy today consisted of evaluation, patient education, and therapeutic exercise. Patient would benefit from continued PT sessions addressing overall pain and dysfunction with use of appropriate interventions.      Treatment Rendered/Intervention:  Evaluation completed as described above followed by discussion of exam findings and plan of care.      Clinical Impressions:  Criteria for Skilled Therapeutic Intervention Met: Yes  PT Diagnosis: Hamstring Strain   Influenced by the following impairments: Posterior Hip Pain, Impaired Hip ROM, Impaired Knee Strength   Functional limitations due to impairment: Running, walking, sitting   Clinical presentation: Stable/Uncomplicated  Clinical presentation rationale: Therapist Discretion   Clinical Decision making (complexity): Low Complexity  Predicted Duration of Therapy Intervention (days/wks): 1-2 x per week for up to 8 weeks   Risks and Benefits of therapy have been explained: Yes  Patient, Family & other staff in agreement with plan of care: Yes  Comments: N/a   Frequency: 2 times/week  Date Range: 4/24/19 to 6/24/19    Total Evaluation Time: 25

## 2019-04-26 ENCOUNTER — HOSPITAL ENCOUNTER (OUTPATIENT)
Dept: PHYSICAL THERAPY | Facility: HOSPITAL | Age: 49
Setting detail: THERAPIES SERIES
End: 2019-04-26
Attending: FAMILY MEDICINE
Payer: COMMERCIAL

## 2019-04-26 PROCEDURE — 97110 THERAPEUTIC EXERCISES: CPT | Mod: GP

## 2019-04-26 PROCEDURE — 97033 APP MDLTY 1+IONTPHRSIS EA 15: CPT | Mod: GP

## 2019-05-01 ENCOUNTER — HOSPITAL ENCOUNTER (OUTPATIENT)
Dept: PHYSICAL THERAPY | Facility: HOSPITAL | Age: 49
Setting detail: THERAPIES SERIES
End: 2019-05-01
Attending: FAMILY MEDICINE
Payer: COMMERCIAL

## 2019-05-01 PROCEDURE — 40000268 ZZH STATISTIC NO CHARGES: Performed by: PHYSICAL THERAPIST

## 2019-05-01 NOTE — PROGRESS NOTES
Dry Needling Intervention Note    Date: May 1, 2019  Complaint: L buttock/glute pain  Pain: 3  Progress: Patient was seen for her first sessio of dry needling intervention to address her persistent L glute/posterior hip pain    Patient was seen for a session of dry needling intervention.  Informed consent was obtained from the patient after reviewing indications, precautions and contraindications.      Patient position: Prone.  Patient was appropriately draped.  Therapist's hands were washed and gloved appropriately to ensure safety. The call light was placed within reach of the patient and the patient was instructed on use.    Dry Needling Protocol Applied: 50-75mm single-sheath, single-use needles were independently inserted into the following protocol points:    Ah-shi1: 50-75mm, posterior aspect of gluteus medius. 1 cun lateral of the PSIS needling perpendicular to the skin onto the posterior ilium  Parrish Morris: 50-75mm, middle aspect of gluteus medius. 1/2 between PSIS and ASIS needling perpendicular to the skin onto the posterior ilium  Ah-shi2: 50-75mm, anterior aspect of gluteus medius.  Between iliac crest and greater trochanter (25% inferior to greater trochanter) needling perpendicular to the skin onto posterior ilium  Ah-shi3: 50-75mm, posterior aspect of the gluteus minimus.  1.5 cun inferior of Parrish Morris needling perpendicular to the skin onto posterior ilium  Ah-shi4:50-75mm. Between iliac crest and greater trochanter (50% inferior to greater trochanter) needling perpendicular to the skin onto posterior ilium    Electrical Stimulation: Yes. Details: Electrical stimulation was applied once all needles were placed at 2-4 Hz, 250 milliseconds using continuous/biphasic waveform for 12 minutes with a pause half way to manually wind the needles for tissue health promotion.  Patient's status was monitored and no adverse reactions were appreciated.        Upon completion of the treatment, the needles were removed  with clean, gloved hands by the treating therapist and were disposed of immediately in a sharps container.  Skin was inspected and patient was notified of expected response to treatment with the likelihood some some redness and mild aching that should resolve within 24-48 hours.The patient had no adverse reactions to the treatment and was instructed to seek medical care if any of the following symptoms developed:    Shortness of breath on exertion  Increased breathing rate  Chest pain  A dry cough  Bluish discoloration of the skin  Excessive sweating    Patient reported understanding and the dry needling session was completed.        Mery Reed DPT, OCS, Cert. DN

## 2019-05-13 ENCOUNTER — HOSPITAL ENCOUNTER (OUTPATIENT)
Dept: PHYSICAL THERAPY | Facility: HOSPITAL | Age: 49
Setting detail: THERAPIES SERIES
End: 2019-05-13
Attending: FAMILY MEDICINE
Payer: COMMERCIAL

## 2019-05-13 PROCEDURE — 97110 THERAPEUTIC EXERCISES: CPT | Mod: GP

## 2019-05-13 PROCEDURE — 97033 APP MDLTY 1+IONTPHRSIS EA 15: CPT | Mod: GP

## 2019-05-15 ENCOUNTER — HOSPITAL ENCOUNTER (OUTPATIENT)
Dept: PHYSICAL THERAPY | Facility: HOSPITAL | Age: 49
Setting detail: THERAPIES SERIES
End: 2019-05-15
Attending: FAMILY MEDICINE
Payer: COMMERCIAL

## 2019-05-15 PROCEDURE — 40000268 ZZH STATISTIC NO CHARGES: Performed by: PHYSICAL THERAPIST

## 2019-05-15 NOTE — PROGRESS NOTES
Dry Needling Intervention Note    Date: May 15, 2019  Complaint: L buttock/ hip pain  Pain: 2  Progress: Reports she continues to have L buttock/ hip pain that has improved, however she is still experiencing the discomfort with running and more activity    Patient was seen for a session of dry needling intervention.  Informed consent was obtained from the patient after reviewing indications, precautions and contraindications.      Patient position: SL on R.  Patient was appropriately draped.  Therapist's hands were washed and gloved appropriately to ensure safety. The call light was placed within reach of the patient and the patient was instructed on use.    Dry Needling Protocol Applied: 50-75mm single-sheath, single-use needles were independently inserted into the following protocol points:    BL54: distance from skin to sacrum + 15mm, superior aspect of proximal piriformis.  Distance between S2 spinous process and just lateral to PSIS was measured and dropped down the distance from the PSIS needling just off lateral aspect of sacrum posterior to anterior and slightly medial  BL53: distance from skin to the sacrum + 15mm, inferior aspect of the proximal piriformis.  1 cun proximal to BL 54 along sacrum needling just off lateral aspect of sacrum posterior to anterior and slightly medial  GB30: 40--60mm, center of distal piriformis.  3-4 fingers superior and medial of the apex of the greater trochanter needling at an angle toward pubic symphysis onto posterior aspect of greater trochanter  Ah-shi1: 50-75mm, posterior aspect of gluteus medius. 1 cun lateral of the PSIS needling perpendicular to the skin onto the posterior ilium  Parrish Morris: 50-75mm, middle aspect of gluteus medius. 1/2 between PSIS and ASIS needling perpendicular to the skin onto the posterior ilium  Ah-shi2: 50-75mm, anterior aspect of gluteus medius.  Between iliac crest and greater trochanter (25% inferior to greater trochanter) needling  perpendicular to the skin onto posterior ilium  Ah-shi3: 50-75mm, posterior aspect of the gluteus minimus.  1.5 cun inferior of Parrish Morris needling perpendicular to the skin onto posterior ilium  Ah-shi4:50-75mm. Between iliac crest and greater trochanter (50% inferior to greater trochanter) needling perpendicular to the skin onto posterior ilium    Electrical Stimulation: Yes. Details: Electrical stimulation was applied once all needles were placed at 2-4 Hz, 250 milliseconds using continuous/biphasic waveform for 12 minutes with a pause half way to manually wind the needles for tissue health promotion.  Patient's status was monitored and no adverse reactions were appreciated.        Upon completion of the treatment, the needles were removed with clean, gloved hands by the treating therapist and were disposed of immediately in a sharps container.  Skin was inspected and patient was notified of expected response to treatment with the likelihood some some redness and mild aching that should resolve within 24-48 hours.The patient had no adverse reactions to the treatment and was instructed to seek medical care if any of the following symptoms developed:    Shortness of breath on exertion  Increased breathing rate  Chest pain  A dry cough  Bluish discoloration of the skin  Excessive sweating    Patient reported understanding and the dry needling session was completed.        Mery Reed DPT, OCS, Cert. DN

## 2019-05-20 ENCOUNTER — HOSPITAL ENCOUNTER (OUTPATIENT)
Dept: PHYSICAL THERAPY | Facility: HOSPITAL | Age: 49
Setting detail: THERAPIES SERIES
End: 2019-05-20
Attending: FAMILY MEDICINE
Payer: COMMERCIAL

## 2019-05-20 PROCEDURE — 97110 THERAPEUTIC EXERCISES: CPT | Mod: GP

## 2019-05-20 PROCEDURE — 97033 APP MDLTY 1+IONTPHRSIS EA 15: CPT | Mod: GP

## 2019-05-24 ENCOUNTER — TELEPHONE (OUTPATIENT)
Dept: FAMILY MEDICINE | Facility: OTHER | Age: 49
End: 2019-05-24

## 2019-05-24 ENCOUNTER — HOSPITAL ENCOUNTER (OUTPATIENT)
Dept: PHYSICAL THERAPY | Facility: HOSPITAL | Age: 49
Setting detail: THERAPIES SERIES
End: 2019-05-24
Attending: FAMILY MEDICINE
Payer: COMMERCIAL

## 2019-05-24 DIAGNOSIS — S76.012A MUSCLE STRAIN OF LEFT GLUTEAL REGION, INITIAL ENCOUNTER: Primary | ICD-10-CM

## 2019-05-24 PROCEDURE — 97110 THERAPEUTIC EXERCISES: CPT | Mod: GP

## 2019-05-24 NOTE — PROGRESS NOTES
Outpatient Physical Therapy Progress Note     Patient: Kacy Valdez  : 1970    Beginning/End Dates of Reporting Period:  19 to 2019    Referring Provider: Dr. Carlos Hart    Therapy Diagnosis: High Hamstring Injury or Gluteal Bursitis      Client Self Report: Patient still having continued hip pain, increased w/ sitting, w/ minimal to no consistent improvement      Objective Measurements:  No loss of strength or ROM loss    PATIENT PROGRESS:  Patient having likely a high hamstring strain or potentially Ischial bursitis, at times presenting w/ diffuse pain through gluteals.  Pain most reproduced though in long lever position of leg such which would likely implicate high hamstring tendon.  Patient also has pain w/ sitting for extended period of time over gluteal region.  PT has attempted strengthening, hip mobility, iontophoresis, and dry needling to the posterior glute and hamstring region w/ minimal symptom relief w/ no consistency.  As patient is so active and athletic, may benefit from additional imaging or potential referral to ortho for further consideration.     Plan:  Patient to continue w/ isometrics x 3 45-60 sec hold for shortened hamstring position and lengthened hamstring position for next 3 weeks, w/ additional hamstring eccentric control exercises based on tolerance to pain.      Discharge:  No

## 2019-05-24 NOTE — TELEPHONE ENCOUNTER
I have seen Kacy for 1 month in PT working on conservative treatment for pain.  Patient having likely a high hamstring strain or potentially Ischial bursitis, at times presenting w/ diffuse pain through gluteals.  Pain most reproduced though in long lever position of leg such which would likely implicate high hamstring tendon.  Patient also has pain w/ sitting for extended period of time over gluteal region.  PT has attempted strengthening, hip mobility, iontophoresis, and dry needling to the posterior glute and hamstring region w/ minimal symptom relief w/ no consistency.  As patient is so active and athletic, may benefit from additional imaging or potential referral to ortho for further consideration.  Please advise.

## 2019-05-30 ENCOUNTER — TELEPHONE (OUTPATIENT)
Dept: FAMILY MEDICINE | Facility: OTHER | Age: 49
End: 2019-05-30

## 2019-06-07 ENCOUNTER — HOSPITAL ENCOUNTER (OUTPATIENT)
Dept: MRI IMAGING | Facility: HOSPITAL | Age: 49
Discharge: HOME | End: 2019-06-07
Attending: FAMILY MEDICINE | Admitting: FAMILY MEDICINE
Payer: COMMERCIAL

## 2019-06-07 DIAGNOSIS — S76.012A MUSCLE STRAIN OF LEFT GLUTEAL REGION, INITIAL ENCOUNTER: ICD-10-CM

## 2019-06-07 PROCEDURE — 72195 MRI PELVIS W/O DYE: CPT | Mod: TC

## 2019-06-10 DIAGNOSIS — S76.012A MUSCLE STRAIN OF LEFT GLUTEAL REGION, INITIAL ENCOUNTER: Primary | ICD-10-CM

## 2019-06-10 RX ORDER — TRAMADOL HYDROCHLORIDE 50 MG/1
TABLET ORAL
Qty: 40 TABLET | Refills: 0 | Status: SHIPPED | OUTPATIENT
Start: 2019-06-10 | End: 2020-01-08

## 2019-06-10 RX ORDER — METHYLPREDNISOLONE 4 MG
TABLET, DOSE PACK ORAL
Qty: 21 TABLET | Refills: 0 | Status: SHIPPED | OUTPATIENT
Start: 2019-06-10 | End: 2020-01-08

## 2019-06-24 DIAGNOSIS — Z91.018 NUT ALLERGY: ICD-10-CM

## 2019-06-25 RX ORDER — EPINEPHRINE 0.3 MG/.3ML
INJECTION SUBCUTANEOUS
Qty: 2 EACH | Refills: 3 | Status: SHIPPED | OUTPATIENT
Start: 2019-06-25 | End: 2024-03-13

## 2019-06-25 NOTE — TELEPHONE ENCOUNTER
Epi Pen  Last Written Prescription Date: 7/14/17  Last Fill Quantity: 0.6mL # of Refills: 3  Last Office Visit: 12/18/18

## 2019-06-26 ENCOUNTER — TRANSFERRED RECORDS (OUTPATIENT)
Dept: HEALTH INFORMATION MANAGEMENT | Facility: CLINIC | Age: 49
End: 2019-06-26

## 2019-07-01 ENCOUNTER — HOSPITAL ENCOUNTER (OUTPATIENT)
Dept: PHYSICAL THERAPY | Facility: HOSPITAL | Age: 49
Setting detail: THERAPIES SERIES
End: 2019-07-01
Attending: FAMILY MEDICINE
Payer: COMMERCIAL

## 2019-07-01 PROCEDURE — 97110 THERAPEUTIC EXERCISES: CPT | Mod: GP

## 2019-07-01 PROCEDURE — 97530 THERAPEUTIC ACTIVITIES: CPT | Mod: GP

## 2019-07-15 ENCOUNTER — HOSPITAL ENCOUNTER (OUTPATIENT)
Dept: PHYSICAL THERAPY | Facility: HOSPITAL | Age: 49
Setting detail: THERAPIES SERIES
End: 2019-07-15
Attending: FAMILY MEDICINE
Payer: COMMERCIAL

## 2019-07-15 PROCEDURE — 97110 THERAPEUTIC EXERCISES: CPT | Mod: GP

## 2019-07-24 ENCOUNTER — TRANSFERRED RECORDS (OUTPATIENT)
Dept: HEALTH INFORMATION MANAGEMENT | Facility: CLINIC | Age: 49
End: 2019-07-24

## 2019-10-05 DIAGNOSIS — F41.0 PANIC DISORDER WITHOUT AGORAPHOBIA: ICD-10-CM

## 2019-10-07 RX ORDER — PAROXETINE 10 MG/1
TABLET, FILM COATED ORAL
Qty: 90 TABLET | Refills: 0 | Status: SHIPPED | OUTPATIENT
Start: 2019-10-07 | End: 2020-01-08

## 2019-11-27 ENCOUNTER — TRANSFERRED RECORDS (OUTPATIENT)
Dept: HEALTH INFORMATION MANAGEMENT | Facility: CLINIC | Age: 49
End: 2019-11-27

## 2019-12-06 DIAGNOSIS — Z12.31 VISIT FOR SCREENING MAMMOGRAM: Primary | ICD-10-CM

## 2019-12-30 NOTE — PROGRESS NOTES
pap SUBJECTIVE:   CC: Kacy Valdez is an 49 year old woman who presents for preventive health visit.     Healthy Habits:    Do you get at least three servings of calcium containing foods daily (dairy, green leafy vegetables, etc.)? yes    Amount of exercise or daily activities, outside of work: 6 day(s) per week    Problems taking medications regularly No    Medication side effects: No    Have you had an eye exam in the past two years? yes    Do you see a dentist twice per year? yes    Do you have sleep apnea, excessive snoring or daytime drowsiness?no    PHQ-9 SCORE 8/23/2017 9/14/2018 1/8/2020   PHQ-9 Total Score - - -   PHQ-9 Total Score 0 0 0     REG[place]-7 SCORE 8/23/2017 9/14/2018 1/8/2020   Total Score 0 0 0         Musculoskeletal problem/pain      Duration: 1 year    Description  Location: left hip    Intensity:  moderate    Accompanying signs and symptoms: none    History  Previous similar problem: YES  Previous evaluation:  MRI    Precipitating or alleviating factors:  Trauma or overuse: no   Aggravating factors include: sitting    Therapies tried and outcome: heat    Seen Dr Jay multiple times - worked with [facility]- along with oral steroids --helped.  BUT all wears off.    IMPRESSION:   1. Tendinosis proximal hamstring tendons. Study was not tailored to  evaluate the hamstring tendons.  2. Heterogeneous marrow signal intensity of uncertain etiology. No  focal destructive lesion or occult fracture.  3. Mild nonspecific soft tissue edema proximal medial thighs  bilaterally, only imaged on the coronal sequences and not included in  the field-of-view of other sequences.     DARIO JONES MD        Today's PHQ-2 Score:   PHQ-2 ( 1999 [employer]) 4/12/2019 9/29/2015   Q1: Little interest or pleasure in doing things 0 0   Q2: Feeling down, depressed or hopeless 0 0   PHQ-2 Score 0 0       Abuse: Current or Past(Physical, Sexual or Emotional)- No  Do you feel safe in your environment? Yes        Social History      Tobacco Use     Smoking status: Never Smoker     Smokeless tobacco: Never Used   Substance Use Topics     Alcohol use: No     If you drink alcohol do you typically have >3 drinks per day or >7 drinks per week? No                     Reviewed orders with patient.  Reviewed health maintenance and updated orders accordingly - Yes  Labs reviewed in [facility]    Mammogram Screening: Patient over age 50, mutual decision to screen reflected in health maintenance.    Pertinent mammograms are reviewed under the imaging tab.  History of abnormal Pap smear: YES - ALEJANDRO 2/3 on biopsy - PAP/HPV co-testing at 12, 24 months.  If two negative results repeat co-testing in 3 years, if negative then routine screening.  PAP / HPV Latest Ref Rng & Units 9/14/2018 8/23/2017 8/3/2016   PAP - NIL NIL NIL   HPV 16 DNA NEG:Negative Negative Negative Negative   HPV 18 DNA NEG:Negative Negative Negative Negative   OTHER HR HPV NEG:Negative Negative Negative Negative   HPV HIGH RISK DNA PROBE - - - -     Reviewed and updated as needed this visit by clinical staff         Reviewed and updated as needed this visit by Provider        Past Medical History:   Diagnosis Date     Carcinoma in situ of cervix uteri 5/19/2009     Dysthymic disorder 8/8/2001     Other acne 1/10/2002     Panic disorder without agoraphobia 1/3/2011     Papanicolaou smear of cervix with high grade squamous intraepithelial lesion (HGSIL) 4/27/2009     Unspecified sinusitis (chronic) 6/5/2003      Past Surgical History:   Procedure Laterality Date     ------------OTHER-------------  2009    LEEP     ------------OTHER-------------  2005    IUD     BIOPSY BREAST      breast lump     BUNIONECTOMY  2010     COLONOSCOPY N/A 10/4/2018    Repeat in 2028//Procedure: COLONOSCOPY;  COLONSCOPY with Polypectomy;  Surgeon: Jese Rizzo MD;  Location: [facility]       ROS:  CONSTITUTIONAL: NEGATIVE for fever, chills, change in weight  INTEGUMENTARU/SKIN: NEGATIVE for worrisome rashes, moles or  "lesions  EYES: NEGATIVE for vision changes or irritation  ENT: NEGATIVE for ear, mouth and throat problems  RESP: NEGATIVE for significant cough or SOB  BREAST: NEGATIVE for masses, tenderness or discharge  CV: NEGATIVE for chest pain, palpitations or peripheral edema  GI: NEGATIVE for nausea, abdominal pain, heartburn, or change in bowel habits  : NEGATIVE for unusual urinary or vaginal symptoms. Periods are absent-- has IUD.  HAS perimenopausal sx of hot flashes   MUSCULOSKELETAL: NEGATIVE for significant arthralgias or myalgia  NEURO: NEGATIVE for weakness, dizziness or paresthesias  PSYCHIATRIC: NEGATIVE for changes in mood or affect    OBJECTIVE:   /72   Pulse 65   Temp 99  F (37.2  C)   Ht 1.613 m (5' 3.5\")   Wt 52.6 kg (116 lb)   SpO2 99%   BMI 20.23 kg/m    EXAM:  GENERAL: healthy, alert and no distress  EYES: Eyes grossly normal to inspection, PERRL and conjunctivae and sclerae normal  HENT: ear canals and TM's normal, nose and mouth without ulcers or lesions  NECK: no adenopathy, no asymmetry, masses, or scars and thyroid normal to palpation  RESP: lungs clear to auscultation - no rales, rhonchi or wheezes  BREAST: normal without masses, tenderness or nipple discharge and no palpable axillary masses or adenopathy  CV: regular rate and rhythm, normal S1 S2, no S3 or S4, no murmur, click or rub, no peripheral edema and peripheral pulses strong  ABDOMEN: soft, nontender, no hepatosplenomegaly, no masses and bowel sounds normal   (female): normal female external genitalia, normal urethral meatus, vaginal mucosa pink, moist, well rugated, and normal cervix/adnexa/uterus without masses or discharge  IUD strings present  MS: no gross musculoskeletal defects noted, no edema-- POINT TENDER 6-8 INCHES POSTERIOR TO GREATER TROCHANTER ON LEFT SIDE  SKIN: no suspicious lesions or rashes  NEURO: Normal strength and tone, mentation intact and speech normal  PSYCH: mentation appears normal, affect " normal/bright  LYMPH: no cervical, supraclavicular, axillary, or inguinal adenopathy        ASSESSMENT/PLAN:   (Z00.00) Routine general medical examination at a health care facility  (primary encounter diagnosis)  Comment: overall very healthy  Plan: Comprehensive metabolic panel, CBC with         platelets differential, Lipid Profile, TSH            (Z80.3) Family history of breast cancer in sister  Comment: Mammogram ok   Plan: Comprehensive metabolic panel, CBC with         platelets differential            (Z87.410) History of cervical dysplasia  Comment: discussed again -- I feel does not need repeat pap and could not follow Dr Shirley plan of yearly    PT is very uncomfortable with this and wants to continue yearly pap.  Pt says HGSIL came on suddenly back a decade ago  Plan: Comprehensive metabolic panel, A pap thin layer        screen with  HPV - recommended age 30 - 65         years (select HPV order below), HPV High Risk         Types DNA Cervical            (F41.8) Depression with anxiety  Comment: stable   Plan: CBC with platelets differential, TSH            (Z23) Need for prophylactic vaccination and inoculation against influenza  Comment: shot given   Plan: INFLUENZA VACCINE IM > 6 MONTHS VALENT IIV4         [87408], Vaccine Administration, Initial         [49440]            (Z23) Need for vaccination  Comment: shot   NOT given -- out for now  Plan: SHINGRIX [04958], Each additional admin.          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         COUNSELING:   Reviewed preventive health counseling, as reflected in patient instructions       Regular exercise       Healthy diet/nutrition    Estimated body mass index is 21.27 kg/m  as calculated from the following:    Height as of 4/12/19: 1.613 m (5' 3.5\").    Weight as of 4/12/19: 55.3 kg (122 lb).         reports that she has never smoked. She has never used smokeless tobacco.      Counseling Resources:  ATP IV Guidelines  Pooled Cohorts Equation Calculator  Breast Cancer Risk Calculator  FRAX Risk Assessment  ICSI Preventive Guidelines  Dietary Guidelines for Americans, 2010  USDA's MyPlate  ASA Prophylaxis  Lung CA Screening    Carlos Hart MD  [facility] - [place]  "

## 2020-01-08 ENCOUNTER — OFFICE VISIT (OUTPATIENT)
Dept: FAMILY MEDICINE | Facility: OTHER | Age: 50
End: 2020-01-08
Attending: FAMILY MEDICINE
Payer: COMMERCIAL

## 2020-01-08 ENCOUNTER — ANCILLARY PROCEDURE (OUTPATIENT)
Dept: MAMMOGRAPHY | Facility: OTHER | Age: 50
End: 2020-01-08
Attending: FAMILY MEDICINE
Payer: COMMERCIAL

## 2020-01-08 VITALS
DIASTOLIC BLOOD PRESSURE: 72 MMHG | SYSTOLIC BLOOD PRESSURE: 118 MMHG | WEIGHT: 116 LBS | TEMPERATURE: 99 F | HEART RATE: 65 BPM | HEIGHT: 64 IN | BODY MASS INDEX: 19.81 KG/M2 | OXYGEN SATURATION: 99 %

## 2020-01-08 DIAGNOSIS — Z23 NEED FOR PROPHYLACTIC VACCINATION AND INOCULATION AGAINST INFLUENZA: ICD-10-CM

## 2020-01-08 DIAGNOSIS — Z00.00 ROUTINE GENERAL MEDICAL EXAMINATION AT A HEALTH CARE FACILITY: Primary | ICD-10-CM

## 2020-01-08 DIAGNOSIS — Z87.410 HISTORY OF CERVICAL DYSPLASIA: ICD-10-CM

## 2020-01-08 DIAGNOSIS — D06.0 CARCINOMA IN SITU OF ENDOCERVIX: ICD-10-CM

## 2020-01-08 DIAGNOSIS — S76.012A MUSCLE STRAIN OF LEFT GLUTEAL REGION, INITIAL ENCOUNTER: ICD-10-CM

## 2020-01-08 DIAGNOSIS — N95.1 PERIMENOPAUSAL: ICD-10-CM

## 2020-01-08 DIAGNOSIS — Z12.31 VISIT FOR SCREENING MAMMOGRAM: ICD-10-CM

## 2020-01-08 DIAGNOSIS — Z23 NEED FOR VACCINATION: ICD-10-CM

## 2020-01-08 DIAGNOSIS — F41.0 PANIC DISORDER WITHOUT AGORAPHOBIA: ICD-10-CM

## 2020-01-08 DIAGNOSIS — B97.7 HIGH RISK HPV INFECTION: ICD-10-CM

## 2020-01-08 DIAGNOSIS — F41.8 DEPRESSION WITH ANXIETY: ICD-10-CM

## 2020-01-08 DIAGNOSIS — Z80.3 FAMILY HISTORY OF BREAST CANCER IN SISTER: ICD-10-CM

## 2020-01-08 PROCEDURE — 99396 PREV VISIT EST AGE 40-64: CPT | Mod: 25 | Performed by: FAMILY MEDICINE

## 2020-01-08 PROCEDURE — 88142 CYTOPATH C/V THIN LAYER: CPT | Performed by: FAMILY MEDICINE

## 2020-01-08 PROCEDURE — 87624 HPV HI-RISK TYP POOLED RSLT: CPT | Performed by: FAMILY MEDICINE

## 2020-01-08 PROCEDURE — 90686 IIV4 VACC NO PRSV 0.5 ML IM: CPT | Performed by: FAMILY MEDICINE

## 2020-01-08 PROCEDURE — 77067 SCR MAMMO BI INCL CAD: CPT | Mod: TC

## 2020-01-08 PROCEDURE — 77063 BREAST TOMOSYNTHESIS BI: CPT | Mod: TC

## 2020-01-08 PROCEDURE — 99212 OFFICE O/P EST SF 10 MIN: CPT | Mod: 25 | Performed by: FAMILY MEDICINE

## 2020-01-08 PROCEDURE — 90471 IMMUNIZATION ADMIN: CPT | Performed by: FAMILY MEDICINE

## 2020-01-08 RX ORDER — PAROXETINE 10 MG/1
10 TABLET, FILM COATED ORAL DAILY
Qty: 90 TABLET | Refills: 3 | Status: SHIPPED | OUTPATIENT
Start: 2020-01-08 | End: 2021-01-11

## 2020-01-08 ASSESSMENT — PAIN SCALES - GENERAL: PAINLEVEL: SEVERE PAIN (6)

## 2020-01-08 ASSESSMENT — MIFFLIN-ST. JEOR: SCORE: 1118.23

## 2020-01-08 ASSESSMENT — ANXIETY QUESTIONNAIRES
2. NOT BEING ABLE TO STOP OR CONTROL WORRYING: NOT AT ALL
4. TROUBLE RELAXING: NOT AT ALL
3. WORRYING TOO MUCH ABOUT DIFFERENT THINGS: NOT AT ALL
6. BECOMING EASILY ANNOYED OR IRRITABLE: NOT AT ALL
1. FEELING NERVOUS, ANXIOUS, OR ON EDGE: NOT AT ALL
GAD7 TOTAL SCORE: 0
7. FEELING AFRAID AS IF SOMETHING AWFUL MIGHT HAPPEN: NOT AT ALL
5. BEING SO RESTLESS THAT IT IS HARD TO SIT STILL: NOT AT ALL

## 2020-01-08 ASSESSMENT — PATIENT HEALTH QUESTIONNAIRE - PHQ9: SUM OF ALL RESPONSES TO PHQ QUESTIONS 1-9: 0

## 2020-01-08 NOTE — NURSING NOTE
"Chief Complaint   Patient presents with     Physical       Initial /72   Pulse 65   Temp 99  F (37.2  C)   Ht 1.613 m (5' 3.5\")   Wt 52.6 kg (116 lb)   SpO2 99%   BMI 20.23 kg/m   Estimated body mass index is 20.23 kg/m  as calculated from the following:    Height as of this encounter: 1.613 m (5' 3.5\").    Weight as of this encounter: 52.6 kg (116 lb).  Medication Reconciliation: complete  Shaw Fu LPN  "

## 2020-01-09 DIAGNOSIS — Z87.410 HISTORY OF CERVICAL DYSPLASIA: ICD-10-CM

## 2020-01-09 DIAGNOSIS — Z80.3 FAMILY HISTORY OF BREAST CANCER IN SISTER: ICD-10-CM

## 2020-01-09 DIAGNOSIS — Z00.00 ROUTINE GENERAL MEDICAL EXAMINATION AT A HEALTH CARE FACILITY: ICD-10-CM

## 2020-01-09 DIAGNOSIS — F41.8 DEPRESSION WITH ANXIETY: ICD-10-CM

## 2020-01-09 LAB
ALBUMIN SERPL-MCNC: 4.3 G/DL (ref 3.4–5)
ALP SERPL-CCNC: 94 U/L (ref 40–150)
ALT SERPL W P-5'-P-CCNC: 21 U/L (ref 0–50)
ANION GAP SERPL CALCULATED.3IONS-SCNC: 2 MMOL/L (ref 3–14)
AST SERPL W P-5'-P-CCNC: 16 U/L (ref 0–45)
BASOPHILS # BLD AUTO: 0 10E9/L (ref 0–0.2)
BASOPHILS NFR BLD AUTO: 0.4 %
BILIRUB SERPL-MCNC: 0.4 MG/DL (ref 0.2–1.3)
BUN SERPL-MCNC: 17 MG/DL (ref 7–30)
CALCIUM SERPL-MCNC: 9 MG/DL (ref 8.5–10.1)
CHLORIDE SERPL-SCNC: 108 MMOL/L (ref 94–109)
CHOLEST SERPL-MCNC: 192 MG/DL
CO2 SERPL-SCNC: 28 MMOL/L (ref 20–32)
CREAT SERPL-MCNC: 0.85 MG/DL (ref 0.52–1.04)
DIFFERENTIAL METHOD BLD: ABNORMAL
EOSINOPHIL # BLD AUTO: 0.1 10E9/L (ref 0–0.7)
EOSINOPHIL NFR BLD AUTO: 2 %
ERYTHROCYTE [DISTWIDTH] IN BLOOD BY AUTOMATED COUNT: 12.1 % (ref 10–15)
GFR SERPL CREATININE-BSD FRML MDRD: 78 ML/MIN/{1.73_M2}
GLUCOSE SERPL-MCNC: 75 MG/DL (ref 70–99)
HCT VFR BLD AUTO: 42.7 % (ref 35–47)
HDLC SERPL-MCNC: 82 MG/DL
HGB BLD-MCNC: 14.7 G/DL (ref 11.7–15.7)
IMM GRANULOCYTES # BLD: 0 10E9/L (ref 0–0.4)
IMM GRANULOCYTES NFR BLD: 0.2 %
LDLC SERPL CALC-MCNC: 102 MG/DL
LYMPHOCYTES # BLD AUTO: 0.6 10E9/L (ref 0.8–5.3)
LYMPHOCYTES NFR BLD AUTO: 13.3 %
MCH RBC QN AUTO: 30.4 PG (ref 26.5–33)
MCHC RBC AUTO-ENTMCNC: 34.4 G/DL (ref 31.5–36.5)
MCV RBC AUTO: 88 FL (ref 78–100)
MONOCYTES # BLD AUTO: 0.5 10E9/L (ref 0–1.3)
MONOCYTES NFR BLD AUTO: 11.9 %
NEUTROPHILS # BLD AUTO: 3.2 10E9/L (ref 1.6–8.3)
NEUTROPHILS NFR BLD AUTO: 72.2 %
NONHDLC SERPL-MCNC: 110 MG/DL
NRBC # BLD AUTO: 0 10*3/UL
NRBC BLD AUTO-RTO: 0 /100
PLATELET # BLD AUTO: 237 10E9/L (ref 150–450)
POTASSIUM SERPL-SCNC: 4.2 MMOL/L (ref 3.4–5.3)
PROT SERPL-MCNC: 7.7 G/DL (ref 6.8–8.8)
RBC # BLD AUTO: 4.83 10E12/L (ref 3.8–5.2)
SODIUM SERPL-SCNC: 138 MMOL/L (ref 133–144)
TRIGL SERPL-MCNC: 42 MG/DL
TSH SERPL DL<=0.005 MIU/L-ACNC: 2.62 MU/L (ref 0.4–4)
WBC # BLD AUTO: 4.5 10E9/L (ref 4–11)

## 2020-01-09 PROCEDURE — 80061 LIPID PANEL: CPT | Performed by: FAMILY MEDICINE

## 2020-01-09 PROCEDURE — 36415 COLL VENOUS BLD VENIPUNCTURE: CPT | Performed by: FAMILY MEDICINE

## 2020-01-09 PROCEDURE — 80050 GENERAL HEALTH PANEL: CPT | Performed by: FAMILY MEDICINE

## 2020-01-09 ASSESSMENT — ANXIETY QUESTIONNAIRES: GAD7 TOTAL SCORE: 0

## 2020-01-10 ENCOUNTER — ALLIED HEALTH/NURSE VISIT (OUTPATIENT)
Dept: FAMILY MEDICINE | Facility: OTHER | Age: 50
End: 2020-01-10
Attending: FAMILY MEDICINE
Payer: COMMERCIAL

## 2020-01-10 ENCOUNTER — HOSPITAL ENCOUNTER (OUTPATIENT)
Dept: GENERAL RADIOLOGY | Facility: HOSPITAL | Age: 50
Discharge: HOME | End: 2020-01-10
Attending: FAMILY MEDICINE | Admitting: FAMILY MEDICINE
Payer: COMMERCIAL

## 2020-01-10 DIAGNOSIS — Z23 IMMUNIZATION DUE: Primary | ICD-10-CM

## 2020-01-10 DIAGNOSIS — S76.012A MUSCLE STRAIN OF LEFT GLUTEAL REGION, INITIAL ENCOUNTER: ICD-10-CM

## 2020-01-10 PROCEDURE — 90750 HZV VACC RECOMBINANT IM: CPT

## 2020-01-10 PROCEDURE — 90471 IMMUNIZATION ADMIN: CPT

## 2020-01-10 PROCEDURE — G0463 HOSPITAL OUTPT CLINIC VISIT: HCPCS | Mod: TC

## 2020-01-14 LAB
COPATH REPORT: NORMAL
PAP: NORMAL

## 2020-01-29 ENCOUNTER — HOSPITAL ENCOUNTER (OUTPATIENT)
Dept: GENERAL RADIOLOGY | Facility: HOSPITAL | Age: 50
Discharge: HOME | End: 2020-01-29
Attending: RADIOLOGY | Admitting: FAMILY MEDICINE
Payer: COMMERCIAL

## 2020-01-29 DIAGNOSIS — M76.899 HAMSTRING TENDINITIS: ICD-10-CM

## 2020-01-29 PROCEDURE — 20552 NJX 1/MLT TRIGGER POINT 1/2: CPT | Mod: TC

## 2020-01-29 PROCEDURE — 25000125 ZZHC RX 250: Performed by: RADIOLOGY

## 2020-01-29 PROCEDURE — 25000128 H RX IP 250 OP 636: Performed by: RADIOLOGY

## 2020-01-29 RX ORDER — IOPAMIDOL 612 MG/ML
50 INJECTION, SOLUTION INTRAVASCULAR ONCE
Status: DISCONTINUED | OUTPATIENT
Start: 2020-01-29 | End: 2020-01-30 | Stop reason: HOSPADM

## 2020-01-29 RX ORDER — LIDOCAINE HYDROCHLORIDE 10 MG/ML
INJECTION, SOLUTION EPIDURAL; INFILTRATION; INTRACAUDAL; PERINEURAL
Status: DISCONTINUED
Start: 2020-01-29 | End: 2020-01-30 | Stop reason: HOSPADM

## 2020-01-29 RX ORDER — LIDOCAINE HYDROCHLORIDE 10 MG/ML
10 INJECTION, SOLUTION INFILTRATION; PERINEURAL ONCE
Status: COMPLETED | OUTPATIENT
Start: 2020-01-29 | End: 2020-01-29

## 2020-01-29 RX ORDER — METHYLPREDNISOLONE ACETATE 80 MG/ML
80 INJECTION, SUSPENSION INTRA-ARTICULAR; INTRALESIONAL; INTRAMUSCULAR; SOFT TISSUE ONCE
Status: COMPLETED | OUTPATIENT
Start: 2020-01-29 | End: 2020-01-29

## 2020-01-29 RX ORDER — METHYLPREDNISOLONE ACETATE 80 MG/ML
INJECTION, SUSPENSION INTRA-ARTICULAR; INTRALESIONAL; INTRAMUSCULAR; SOFT TISSUE
Status: DISCONTINUED
Start: 2020-01-29 | End: 2020-01-30 | Stop reason: HOSPADM

## 2020-01-29 RX ADMIN — METHYLPREDNISOLONE ACETATE 80 MG: 80 INJECTION, SUSPENSION INTRA-ARTICULAR; INTRALESIONAL; INTRAMUSCULAR; SOFT TISSUE at 13:58

## 2020-01-29 RX ADMIN — LIDOCAINE HYDROCHLORIDE 10 ML: 10 INJECTION, SOLUTION EPIDURAL; INFILTRATION; INTRACAUDAL; PERINEURAL at 13:57

## 2020-01-29 NOTE — DISCHARGE INSTRUCTIONS
Home number on file [phone] (home)  Is it ok to leave a message at this number(s)? Yes    Dr. Brandt completed your procedure on 1/29/2020.    Current Pain Level (0-10 Scale): 4/10  Post Pain Level (0-10):  1/10    Radiology Discharge instructions for Steroid Injection    Activity Level:     Do not do any heavy activity or exercise for 24 hours.   Do not drive for 4 hours after your injection.  Diet:   Return to your normal diet.  Medications:   If you have stopped taking your Aspirin, Coumadin/Warfarin, Ibuprofen, or any   other blood thinner for this procedure you may resume in the morning unless   your primary care provider has given you other instructions.    Diabetics may see an increase in blood sugar after steroid injections. If you are concerned about your blood sugar, please contact your family doctor.    Site Care:  Remove the bandage and bathe or shower the morning after the procedure.      This is a Pain Management procedure.  You will be contacted in two weeks for follow up.    Call your Primary Care Provider if you have the following (if your primary care provider is not available please seek emergency care):   Nausea with vomiting   Severe headache   Drowsiness or confusion   Redness or drainage at the injection or puncture site   Temperature over 101 degrees F   Other concerns   Worsening back pain   Stiff neck

## 2020-01-29 NOTE — IP AVS SNAPSHOT
[address] [address]  Phone:  [phone]                                    After Visit Summary   1/29/2020    Kacy Valdez    MRN: [id number]           After Visit Summary Signature Page    I have received my discharge instructions, and my questions have been answered. I have discussed any challenges I see with this plan with the nurse or doctor.    ..........................................................................................................................................  Patient/Patient Representative Signature      ..........................................................................................................................................  Patient Representative Print Name and Relationship to Patient    ..................................................               ................................................  Date                                   Time    ..........................................................................................................................................  Reviewed by Signature/Title    ...................................................              ..............................................  Date                                               Time          22EPIC Rev 08/18

## 2020-02-12 ENCOUNTER — TELEPHONE (OUTPATIENT)
Dept: INTERVENTIONAL RADIOLOGY/VASCULAR | Facility: HOSPITAL | Age: 50
End: 2020-02-12

## 2020-02-12 NOTE — TELEPHONE ENCOUNTER
CONSULT PATIENT  PAIN INJECTION POST CALL    Procedure: Epidural, Lt ischial tuberosity trigger point.  Radiologist(s): Dr. Rei Brandt  Date of Procedure: 01/29/2020    The patient was not available by telephone.        Chayito Clark RN

## 2020-02-20 ENCOUNTER — TELEPHONE (OUTPATIENT)
Dept: INTERVENTIONAL RADIOLOGY/VASCULAR | Facility: HOSPITAL | Age: 50
End: 2020-02-20

## 2020-03-25 ENCOUNTER — VIRTUAL VISIT (OUTPATIENT)
Dept: FAMILY MEDICINE | Facility: OTHER | Age: 50
End: 2020-03-25
Attending: FAMILY MEDICINE
Payer: COMMERCIAL

## 2020-03-25 ENCOUNTER — NURSE TRIAGE (OUTPATIENT)
Dept: FAMILY MEDICINE | Facility: OTHER | Age: 50
End: 2020-03-25

## 2020-03-25 DIAGNOSIS — J03.90 TONSILLITIS: Primary | ICD-10-CM

## 2020-03-25 PROCEDURE — 99213 OFFICE O/P EST LOW 20 MIN: CPT | Mod: TEL | Performed by: FAMILY MEDICINE

## 2020-03-25 RX ORDER — AMOXICILLIN 875 MG
875 TABLET ORAL 2 TIMES DAILY
Qty: 14 TABLET | Refills: 0 | Status: SHIPPED | OUTPATIENT
Start: 2020-03-25 | End: 2020-04-06

## 2020-03-25 ASSESSMENT — PAIN SCALES - GENERAL: PAINLEVEL: MODERATE PAIN (5)

## 2020-03-25 NOTE — TELEPHONE ENCOUNTER
"Patient called with earache on right side. Only earache denies fever, sore throat, cough and congestion. Symptoms for 10 days. Telephone visit scheduled for patient.     Additional Information    Negative: Sounds like a life-threatening emergency to the triager    Negative: Moving the earlobe or touching the ear clearly increases the pain    Negative: Pink or red swelling behind the ear    Negative: Stiff neck (can't touch chin to chest)    Negative: Patient sounds very sick or weak to the triager    Negative: Severe earache pain    Negative: Fever > 103 F (39.4 C)    Negative: Pointed object was inserted into the ear canal (e.g., a pencil, stick, or wire)    Negative: White, yellow, or green discharge    Negative: Diabetes mellitus or a weak immune system (e.g., HIV positive, cancer chemotherapy, transplant patient)    Negative: Bloody discharge or unexplained bleeding from ear canal    Negative: New blurred vision or vision changes    All other earaches (Exceptions: earache lasting < 1 hour, and earache from air travel)    Answer Assessment - Initial Assessment Questions  1. LOCATION: \"Which ear is involved?\"      Right   2. ONSET: \"When did the ear start hurting\"       10 days  3. SEVERITY: \"How bad is the pain?\"  (Scale 1-10; mild, moderate or severe)    - MILD (1-3): doesn't interfere with normal activities     - MODERATE (4-7): interferes with normal activities or awakens from sleep     - SEVERE (8-10): excruciating pain, unable to do any normal activities       5/10  4. URI SYMPTOMS: \" Do you have a runny nose or cough?\"      no  5. FEVER: \"Do you have a fever?\" If so, ask: \"What is your temperature, how was it measured, and when did it start?\"      no  6. CAUSE: \"Have you been swimming recently?\", \"How often do you use Q-TIPS?\", \"Have you had any recent air travel or scuba diving?\"      Uses q-tips and has had recent air travel   7. OTHER SYMPTOMS: \"Do you have any other symptoms?\" (e.g., headache, stiff " "neck, dizziness, vomiting, runny nose, decreased hearing)      no  8. PREGNANCY: \"Is there any chance you are pregnant?\" \"When was your last menstrual period?\"      n/a    Protocols used: EARACHE-A-OH      "

## 2020-03-25 NOTE — PROGRESS NOTES
"Kacy Valdez is a 50 year old female who is being evaluated via a billable telephone visit.      The patient has been notified of following:     \"This telephone visit will be conducted via a call between you and your physician/provider. We have found that certain health care needs can be provided without the need for a physical exam.  This service lets us provide the care you need with a short phone conversation.  If a prescription is necessary we can send it directly to your pharmacy.  If lab work is needed we can place an order for that and you can then stop by our lab to have the test done at a later time.    If during the course of the call the physician/provider feels a telephone visit is not appropriate, you will not be charged for this service.\"     Kacy Valdez complains of    Chief Complaint   Patient presents with     Ear Problem       I have reviewed and updated the patient's Past Medical History, Social History, Family History and Medication List.    ALLERGIES  Nuts      Additional provider notes: sore throat and right ear pain for ten days. Fatigue- no body aches, no fever, some sinus pressure, no cough   10-14 days - ear and throat-- mainly on right side.  Some sinus sx  On Right side only  Some congestion   No f/c/sight sweats  Constant pain of right side of throat.   No PND   No one else sick   No ear pain with moving ear   Deep pain from throat.   Does have some ETD sx.     Assessment/Plan:  1. Tonsillitis  Discussed and pt to look for tonsillith as discussed when home from work. IF not tonsillith then she is to fill the below. Gargles. Symptomatic treatment was discussed along what is available for OTC medications for symptomatic relief.   Symptomatic treatment was discussed along when patient should call and/or come back into the clinic or go to ER/Urgent care. All questions answered.   - amoxicillin (AMOXIL) 875 MG tablet; Take 1 tablet (875 mg) by mouth 2 times daily  Dispense: 14 tablet; " Refill: 0    Phone call duration:  7 minutes    Carlos Hart MD

## 2020-04-06 ENCOUNTER — VIRTUAL VISIT (OUTPATIENT)
Dept: FAMILY MEDICINE | Facility: OTHER | Age: 50
End: 2020-04-06
Attending: FAMILY MEDICINE
Payer: COMMERCIAL

## 2020-04-06 VITALS
TEMPERATURE: 97.2 F | SYSTOLIC BLOOD PRESSURE: 118 MMHG | WEIGHT: 114 LBS | RESPIRATION RATE: 18 BRPM | BODY MASS INDEX: 19.88 KG/M2 | HEART RATE: 67 BPM | DIASTOLIC BLOOD PRESSURE: 80 MMHG | OXYGEN SATURATION: 99 %

## 2020-04-06 DIAGNOSIS — R07.0 THROAT PAIN: Primary | ICD-10-CM

## 2020-04-06 DIAGNOSIS — H65.91 RIGHT NON-SUPPURATIVE OTITIS MEDIA: ICD-10-CM

## 2020-04-06 DIAGNOSIS — H92.01 OTALGIA, RIGHT: ICD-10-CM

## 2020-04-06 DIAGNOSIS — J02.9 SORE THROAT: Primary | ICD-10-CM

## 2020-04-06 LAB
SPECIMEN SOURCE: NORMAL
STREP GROUP A PCR: NOT DETECTED

## 2020-04-06 PROCEDURE — 99213 OFFICE O/P EST LOW 20 MIN: CPT | Performed by: FAMILY MEDICINE

## 2020-04-06 PROCEDURE — 87651 STREP A DNA AMP PROBE: CPT | Performed by: FAMILY MEDICINE

## 2020-04-06 PROCEDURE — 2894A VOIDCORRECT: CPT | Performed by: FAMILY MEDICINE

## 2020-04-06 RX ORDER — AZITHROMYCIN 250 MG/1
TABLET, FILM COATED ORAL
Qty: 6 TABLET | Refills: 0 | Status: SHIPPED | OUTPATIENT
Start: 2020-04-06 | End: 2020-04-20

## 2020-04-06 ASSESSMENT — PAIN SCALES - GENERAL: PAINLEVEL: MODERATE PAIN (4)

## 2020-04-06 NOTE — PROGRESS NOTES
"Subjective     Kacy Valdez is a 50 year old female who is being evaluated via a billable telephone visit.      The patient has been notified of following:     \"This telephone visit will be conducted via a call between you and your physician/provider. We have found that certain health care needs can be provided without the need for a physical exam.  This service lets us provide the care you need with a short phone conversation.  If a prescription is necessary we can send it directly to your pharmacy.  If lab work is needed we can place an order for that and you can then stop by our lab to have the test done at a later time.    If during the course of the call the physician/provider feels a telephone visit is not appropriate, you will not be charged for this service.\"     Patient has given verbal consent for Telephone visit?  Yes    Kacy Valdez complains of   Chief Complaint   Patient presents with     Ear Problem       ALLERGIES  Nuts    RESPIRATORY SYMPTOMS      Duration: 3 weeks    Description  sore throat, ear pain right, fatigue/malaise, hoarse voice and myalgias    Severity: moderate    Accompanying signs and symptoms: None    History (predisposing factors):  none    Precipitating or alleviating factors: None    Therapies tried and outcome:  Antibiotics and ear drops.  Abx/steroid drops helped some    Did virtual visit a week ago- note reviewed   Still not better - was on Augmentin for 7 days-- NO help at all  Ear pain is the worse - some cracking and popping / sharp and constant  No sinus pain   No teeth pain   No F/C                  Current Outpatient Medications   Medication Sig Dispense Refill     EPINEPHrine (EPIPEN/ADRENACLICK/OR ANY BX GENERIC EQUIV) 0.3 MG/0.3ML injection 2-pack INJECT 0.3 ML (0.3 MG) INTO THE MUSCLE AS NEEDED FOR ANAPHYLAXIS 2 each 3     levonorgestrel (MIRENA) 20 MCG/24HR IUD 1 each (20 mcg) by Intrauterine route continuous       nystatin-triamcinolone (MYCOLOG II) cream Apply small " amt to area nightly 100 g 6     PARoxetine (PAXIL) 10 MG tablet Take 1 tablet (10 mg) by mouth daily 90 tablet 3     Allergies   Allergen Reactions     Nuts Other (See Comments)     Nut flavor - throat tightens       Reviewed and updated as needed this visit by Provider         Review of Systems   ROS COMP: CONSTITUTIONAL: NEGATIVE for fever, chills, change in weight  RESP: NEGATIVE for significant cough or SOB  CV: NEGATIVE for chest pain, palpitations or peripheral edema       Objective   Reported vitals:  There were no vitals taken for this visit.   healthy, alert and no distress  Psych: Alert and oriented times 3; coherent speech, normal   rate and volume, able to articulate logical thoughts, able   to abstract reason, no tangential thoughts, no hallucinations   or delusions  Her affect is normal              Assessment/Plan:    ICD-10-CM    1. Sore throat  J02.9    2. Otalgia, right  H92.01    Discussed with pt that Augmentin should of made it better. Since not better - I think needs to be seen. Warm Hand off to DR DIALLO done and they will call to make an appt. Pt agreed    No follow-ups on file.      Phone call duration:  5 minutes    Carlos Hart MD

## 2020-04-06 NOTE — NURSING NOTE
"Chief Complaint   Patient presents with     Otalgia       Initial /80 (BP Location: Right arm, Patient Position: Sitting, Cuff Size: Adult Regular)   Pulse 67   Temp 97.2  F (36.2  C) (Tympanic)   Resp 18   Wt 51.7 kg (114 lb)   SpO2 99%   BMI 19.88 kg/m   Estimated body mass index is 19.88 kg/m  as calculated from the following:    Height as of 1/8/20: 1.613 m (5' 3.5\").    Weight as of this encounter: 51.7 kg (114 lb).  Medication Reconciliation: complete  Jaya Gupta LPN  "

## 2020-04-06 NOTE — PROGRESS NOTES
Subjective     Kacy Valdez is a 50 year old female who presents to clinic today for the following health issues:    HPI   Ear ache/sore throat      Duration: 3 weeks    Description  sore throat, ear pain right, fatigue/malaise and hoarse voice    Severity: moderate to severe    Accompanying signs and symptoms: None    History (predisposing factors):  none    Precipitating or alleviating factors: seems to get worse when moving around such as stretching     Therapies tried and outcome:  Amoxacillin, ibuprofen, cortisporin drops    No fever, no cough    Patient Active Problem List   Diagnosis     Annual physical exam     History of abnormal Pap smear     Family planning     Dysthymia     Family history of breast cancer in sister     Depression with anxiety     High risk HPV infection     Breast cancer screening, high risk patient     Nut allergy     Stenosis, cervix     Chondromalacia of patella, left     Chondromalacia of patella, right     Subluxation of left patella, initial encounter     Subluxation of right patella, initial encounter     Carcinoma in situ of cervix uteri     Perimenopausal     Past Surgical History:   Procedure Laterality Date     ------------OTHER-------------  2009    LEEP     ------------OTHER-------------  2005    IUD     BIOPSY BREAST      breast lump     BUNIONECTOMY  2010     COLONOSCOPY N/A 10/4/2018    Repeat in 2028//Procedure: COLONOSCOPY;  COLONSCOPY with Polypectomy;  Surgeon: Jese Rizzo MD;  Location: [facility]     IR CONSULTATION FOR IR EXAM  1/10/2020     IR TRIGGER POINT INJ 1 OR 2 MUSCLES  1/29/2020       Social History     Tobacco Use     Smoking status: Never Smoker     Smokeless tobacco: Never Used   Substance Use Topics     Alcohol use: No     Family History   Problem Relation Age of Onset     Cancer Sister 48        breast     Breast Cancer Paternal Grandmother 71         Current Outpatient Medications   Medication Sig Dispense Refill     azithromycin (ZITHROMAX) 250 MG  tablet Two tablets first day, then one tablet daily for four days. 6 tablet 0     levonorgestrel (MIRENA) 20 MCG/24HR IUD 1 each (20 mcg) by Intrauterine route continuous       nystatin-triamcinolone (MYCOLOG II) cream Apply small amt to area nightly 100 g 6     PARoxetine (PAXIL) 10 MG tablet Take 1 tablet (10 mg) by mouth daily 90 tablet 3     EPINEPHrine (EPIPEN/ADRENACLICK/OR ANY BX GENERIC EQUIV) 0.3 MG/0.3ML injection 2-pack INJECT 0.3 ML (0.3 MG) INTO THE MUSCLE AS NEEDED FOR ANAPHYLAXIS (Patient not taking: Reported on 4/6/2020) 2 each 3     Allergies   Allergen Reactions     Nuts Other (See Comments)     Nut flavor - throat tightens     BP Readings from Last 3 Encounters:   04/06/20 118/80   01/08/20 118/72   04/12/19 122/68    Wt Readings from Last 3 Encounters:   04/06/20 51.7 kg (114 lb)   01/08/20 52.6 kg (116 lb)   04/12/19 55.3 kg (122 lb)                    Reviewed and updated as needed this visit by Provider         Review of Systems   ROS COMP: Constitutional, HEENT, cardiovascular, pulmonary, gi and gu systems are negative, except as otherwise noted.      Objective    /80 (BP Location: Right arm, Patient Position: Sitting, Cuff Size: Adult Regular)   Pulse 67   Temp 97.2  F (36.2  C) (Tympanic)   Resp 18   Wt 51.7 kg (114 lb)   SpO2 99%   BMI 19.88 kg/m    Body mass index is 19.88 kg/m .  Physical Exam   GENERAL: healthy, alert and no distress  EYES: Eyes grossly normal to inspection, PERRL and conjunctivae and sclerae normal  HENT: normal cephalic/atraumatic, Right TM is mildly injected  Left  ear canal and TM's normal, nose and mouth without ulcers or lesions, oropharynx injected without exudate and oral mucous membranes moist  NECK: no adenopathy, no asymmetry, masses, or scars and thyroid normal to palpation  MS: no gross musculoskeletal defects noted, no edema  NEURO: Normal strength and tone, mentation intact and speech normal  PSYCH: mentation appears normal, affect  normal/bright  Results for orders placed or performed in visit on 04/06/20   Group A Streptococcus PCR Throat Swab ([place] ONLY)     Status: None    Specimen: Throat   Result Value Ref Range    Specimen Description Throat     Strep Group A PCR Not Detected NDET^Not Detected               Assessment & Plan     1. Throat pain  Strep testing is negative   - Group A Streptococcus PCR Throat Swab ([place] ONLY)    2. Right non-suppurative otitis media  Treat with zithromax, discussed how to take. Follow up if not improving or for concerns   - azithromycin (ZITHROMAX) 250 MG tablet; Two tablets first day, then one tablet daily for four days.  Dispense: 6 tablet; Refill: 0           Return if symptoms worsen or fail to improve.    Justine Jaramillo MD    [facility] - [place]

## 2020-04-06 NOTE — NURSING NOTE
"Chief Complaint   Patient presents with     Ear Problem       Initial There were no vitals taken for this visit. Estimated body mass index is 20.23 kg/m  as calculated from the following:    Height as of 1/8/20: 1.613 m (5' 3.5\").    Weight as of 1/8/20: 52.6 kg (116 lb).  Medication Reconciliation: complete  Shaw Fu LPN  "

## 2020-04-20 ENCOUNTER — VIRTUAL VISIT (OUTPATIENT)
Dept: FAMILY MEDICINE | Facility: OTHER | Age: 50
End: 2020-04-20
Attending: FAMILY MEDICINE
Payer: COMMERCIAL

## 2020-04-20 DIAGNOSIS — H92.01 OTALGIA, RIGHT: Primary | ICD-10-CM

## 2020-04-20 DIAGNOSIS — H69.91 DYSFUNCTION OF RIGHT EUSTACHIAN TUBE: ICD-10-CM

## 2020-04-20 PROCEDURE — 99212 OFFICE O/P EST SF 10 MIN: CPT | Mod: TEL | Performed by: FAMILY MEDICINE

## 2020-04-20 NOTE — PATIENT INSTRUCTIONS
"To help open Eustachian Tube and relieve plugged feeling  In ear.     Buy over the counter Afrin - NOT generic version and with spray tip  NOT  Pump style tip    Lay down on side that is bothering you     Insert Afrin nasal tip in the same side nasal opening.  ( if laying on right side then place in right sided nasal opening)     Pour or gently squeeze so that Afrin liquid starts pouring and going into nares so that  you start tasting it.  You will feel the liquid  going town back of throat - Then stop.     Pull Afrin bottle out of nose and plug that side of nose for 10-15 min while lying there.    Then can get up ( or do other sided if needed)     Then through the day- start or frequently do exercises to open the Eustachian Tube.  These are yawning, chewing gum and trying to \" plug nose and pop ears \".     You can do the above  every 12 hrs up to 3 days and for sure no more than 4 days if not doing this more than once a day.    Hopefully this will open the plugged ear so can hear better.                   "

## 2020-07-31 DIAGNOSIS — B37.2 CANDIDIASIS OF SKIN: Primary | ICD-10-CM

## 2020-07-31 RX ORDER — NYSTATIN AND TRIAMCINOLONE ACETONIDE 100000; 1 [USP'U]/G; MG/G
CREAM TOPICAL
Qty: 100 G | Refills: 0 | Status: SHIPPED | OUTPATIENT
Start: 2020-07-31 | End: 2024-01-29

## 2020-08-12 NOTE — PROGRESS NOTES
"Kacy Valdez is a 50 year old female who is being evaluated via a billable video visit.      The patient has been notified of following:     \"This video visit will be conducted via a call between you and your physician/provider. We have found that certain health care needs can be provided without the need for an in-person physical exam.  This service lets us provide the care you need with a video conversation.  If a prescription is necessary we can send it directly to your pharmacy.  If lab work is needed we can place an order for that and you can then stop by our lab to have the test done at a later time.    Video visits are billed at different rates depending on your insurance coverage.  Please reach out to your insurance provider with any questions.    If during the course of the call the physician/provider feels a video visit is not appropriate, you will not be charged for this service.\"    Patient has given verbal consent for Video visit? {YES-NO  Default Yes:4444::\"Yes\"}  How would you like to obtain your AVS? {AVS Preference:971214}  If you are dropped from the video visit, the video invite should be resent to: {video visit invitation:068892}  Will anyone else be joining your video visit? {:847460}  {If patient encounters technical issues they should call [phone] :822336}    Subjective     Kacy Valdez is a 50 year old female who presents today via video visit for the following health issues:    HPI    Musculoskeletal problem/pain      Duration: ***    Description  Location: ***    Intensity:  {mild,moderate,severe:261832}    Accompanying signs and symptoms: {OTHER MS SYMPTOMS:282422::\"none\"}    History  Previous similar problem: { :702115}  Previous evaluation:  {PREVIOUS ms evaluation:418100::\"none\"}    Precipitating or alleviating factors:  Trauma or overuse: { :349360}  Aggravating factors include: {AGGRAVATING MS FACTORS CHRONIC PROB:705687::\"none\"}    Therapies tried and outcome: {MS RELIEF " "ITEMS:414728::\"nothing\"}    {PEDS Chronic and Acute Problems (Optional):432642}     Video Start Time: {video visit start/end time for provider to select:113570}    {additonal problems for provider to add (Optional):888631}    {HIST REVIEW/ LINKS 2 (Optional):793522}    Reviewed and updated as needed this visit by Provider         Review of Systems   {ROS COMP (Optional):819230}      Objective           Vitals:  No vitals were obtained today due to virtual visit.    Physical Exam     {video visit exam brief selected:075111::\"GENERAL: Healthy, alert and no distress\",\"EYES: Eyes grossly normal to inspection.  No discharge or erythema, or obvious scleral/conjunctival abnormalities.\",\"RESP: No audible wheeze, cough, or visible cyanosis.  No visible retractions or increased work of breathing.  \",\"SKIN: Visible skin clear. No significant rash, abnormal pigmentation or lesions.\",\"NEURO: Cranial nerves grossly intact.  Mentation and speech appropriate for age.\",\"PSYCH: Mentation appears normal, affect normal/bright, judgement and insight intact, normal speech and appearance well-groomed.\"}      {Diagnostic Test Results (Optional):819288::\"Diagnostic Test Results:\",\"Labs reviewed in Epic\"}        {PROVIDER CHARTING PREFERENCE:191528}      Video-Visit Details    Type of service:  Video Visit    Video End Time:{video visit start/end time for provider to select:974804}    Originating Location (pt. Location): {video visit patient location:728788::\"Home\"}    Distant Location (provider location):  [facility]     Platform used for Video Visit: {Virtual Visit Platforms:978140::\"AmWell\"}    No follow-ups on file.       {signature options:489197}         "

## 2020-08-18 ENCOUNTER — VIRTUAL VISIT (OUTPATIENT)
Dept: FAMILY MEDICINE | Facility: OTHER | Age: 50
End: 2020-08-18
Attending: FAMILY MEDICINE
Payer: COMMERCIAL

## 2020-08-18 DIAGNOSIS — M76.899 HAMSTRING TENDINITIS: Primary | ICD-10-CM

## 2020-08-18 PROCEDURE — 99213 OFFICE O/P EST LOW 20 MIN: CPT | Mod: TEL | Performed by: FAMILY MEDICINE

## 2020-08-18 RX ORDER — CELECOXIB 200 MG/1
200 CAPSULE ORAL DAILY
Qty: 30 CAPSULE | Refills: 3 | Status: SHIPPED | OUTPATIENT
Start: 2020-08-18 | End: 2022-01-24

## 2020-08-18 ASSESSMENT — ANXIETY QUESTIONNAIRES
6. BECOMING EASILY ANNOYED OR IRRITABLE: NOT AT ALL
3. WORRYING TOO MUCH ABOUT DIFFERENT THINGS: NOT AT ALL
2. NOT BEING ABLE TO STOP OR CONTROL WORRYING: NOT AT ALL
GAD7 TOTAL SCORE: 0
7. FEELING AFRAID AS IF SOMETHING AWFUL MIGHT HAPPEN: NOT AT ALL
5. BEING SO RESTLESS THAT IT IS HARD TO SIT STILL: NOT AT ALL
4. TROUBLE RELAXING: NOT AT ALL
1. FEELING NERVOUS, ANXIOUS, OR ON EDGE: NOT AT ALL

## 2020-08-18 ASSESSMENT — PATIENT HEALTH QUESTIONNAIRE - PHQ9: SUM OF ALL RESPONSES TO PHQ QUESTIONS 1-9: 0

## 2020-08-18 ASSESSMENT — PAIN SCALES - GENERAL: PAINLEVEL: SEVERE PAIN (6)

## 2020-08-18 NOTE — NURSING NOTE
"Chief Complaint   Patient presents with     Pain       Initial There were no vitals taken for this visit. Estimated body mass index is 19.88 kg/m  as calculated from the following:    Height as of 1/8/20: 1.613 m (5' 3.5\").    Weight as of 4/6/20: 51.7 kg (114 lb).  Medication Reconciliation: complete  Shaw Fu LPN  "

## 2020-08-18 NOTE — PROGRESS NOTES
"Kacy Valdez is a 50 year old female who is being evaluated via a billable telephone visit.      The patient has been notified of following:     \"This telephone visit will be conducted via a call between you and your physician/provider. We have found that certain health care needs can be provided without the need for a physical exam.  This service lets us provide the care you need with a short phone conversation.  If a prescription is necessary we can send it directly to your pharmacy.  If lab work is needed we can place an order for that and you can then stop by our lab to have the test done at a later time.    Telephone visits are billed at different rates depending on your insurance coverage. During this emergency period, for some insurers they may be billed the same as an in-person visit.  Please reach out to your insurance provider with any questions.    If during the course of the call the physician/provider feels a telephone visit is not appropriate, you will not be charged for this service.\"    Patient has given verbal consent for Telephone visit?  Yes    What phone number would you like to be contacted at? [phone]    How would you like to obtain your AVS? Krys Oliver     Kacy Valdez is a 50 year old female who presents via phone visit today for the following health issues:    HPI    Musculoskeletal problem/pain      Duration: 1 year- worse in last few months    Description  Location: left hamstring    Intensity:  moderate    Accompanying signs and symptoms: radiation of pain to left buttock and down to knee    History  Previous similar problem: YES, had trigger point injection in January  Previous evaluation:  none    Precipitating or alleviating factors:  Trauma or overuse: no   Aggravating factors include: sitting and exercise    Therapies tried and outcome: ice and NSAID - aleve    Pt has had it in past and MRI in past - see below    Trigger injection worked real well and pain free for 5-6 " months    Pt has been exercising and some running.    Worse with sitting    Pain is about as bad as before shot    Exactly the same pain        PROCEDURE: MR SACRUM AND COCCYX W/O CONTRAST 6/7/2019 6:02 PM     HISTORY: Left gluteal pain  in a runner/  question hamstring origin  tendonitis/ failed PT.; Muscle strain of left gluteal region, initial  encounter     COMPARISONS: None.     Meds/Dose Given: None.     TECHNIQUE: Coronal, axial, sagittal and oblique coronal images were  obtained through the pelvis.     FINDINGS: Study was not tailored to evaluate the hamstring tendons.  There is tendinosis in both proximal hamstring tendons but tendons are  not retracted. There is some mild edema in proximal medial thighs  bilaterally, seen on the coronal images but not included in the  field-of-view on any of the other sequences.     Marrow signal intensity is very heterogeneous in signal intensity.  This is a relatively diffuse process. Etiology is not entirely  certain. There is no focal destructive lesion. No occult fracture is  seen.     No pelvic mass or fluid collection is seen.       4                                                                 IMPRESSION:   1. Tendinosis proximal hamstring tendons. Study was not tailored to  evaluate the hamstring tendons.  2. Heterogeneous marrow signal intensity of uncertain etiology. No  focal destructive lesion or occult fracture.  3. Mild nonspecific soft tissue edema proximal medial thighs  bilaterally, only imaged on the coronal sequences and not included in  the field-of-view of other sequences.     DARIO JONES MD     Exam:IR TRIGGER POINT INJ 1 OR 2 MUSCLES.     History:49 years Female Hamstring tendinitis     Comparison:  Single view the pelvis 7/14/2014     Technique: After informed consent was obtained, a timeout was  performed, satisfactorily identifying the patient and the site of the  procedure.     The patient's site of maximal discomfort were assessed on  physical  examination and fluoroscopic guidance.     The patient was then placed in the prone position and prepped and  draped in usual sterile fashion. Local anesthetic consisting of 6 ml  of 1% lidocaine was then instilled into the skin and deep tissues. The  tip of a 22-gauge spinal needle was then directed toward the site of  maximal discomfort. Fluoroscopic guidance was used to position the  needle. Site of maximal discomfort corresponded to the left ischial  tuberosity.  4.0 mL of 1%, preservative-free lidocaine and 80 mg of Depo-Medrol (80  mg milliliters) were then administered into the soft tissues. Needle  was then removed.  The patient tolerated the procedure well.     Findings:  Normal appearance of the ischial tuberosity..  Other: Soft tissues in the buttock region are also unremarkable.                                                                           Impression:  1.  Technically successful fluoroscopically guided therapeutic  injection/trigger point injection targeting the left ischial  tuberosity. .     2.  Total fluoroscopy time: 0 minutes.     3.  RV=R1,Moderate relief.     JUSTIN LIANG MD      Current Outpatient Medications   Medication Sig Dispense Refill     EPINEPHrine (EPIPEN/ADRENACLICK/OR ANY BX GENERIC EQUIV) 0.3 MG/0.3ML injection 2-pack INJECT 0.3 ML (0.3 MG) INTO THE MUSCLE AS NEEDED FOR ANAPHYLAXIS 2 each 3     levonorgestrel (MIRENA) 20 MCG/24HR IUD 1 each (20 mcg) by Intrauterine route continuous       nystatin-triamcinolone (MYCOLOG II) 007088-5.1 UNIT/GM-% external cream Apply small amt to area nightly 100 g 0     PARoxetine (PAXIL) 10 MG tablet Take 1 tablet (10 mg) by mouth daily 90 tablet 3     Allergies   Allergen Reactions     Nuts Other (See Comments)     Nut flavor - throat tightens       Reviewed and updated as needed this visit by Provider         Review of Systems   Constitutional, HEENT, cardiovascular, pulmonary, gi and gu systems are negative, except as  otherwise noted.       Objective          Vitals:  No vitals were obtained today due to virtual visit.    healthy, alert and no distress  PSYCH: Alert and oriented times 3; coherent speech, normal   rate and volume, able to articulate logical thoughts, able   to abstract reason, no tangential thoughts, no hallucinations   or delusions  Her affect is normal  RESP: No cough, no audible wheezing, able to talk in full sentences  Remainder of exam unable to be completed due to telephone visits            Assessment/Plan:    Assessment & Plan       ICD-10-CM    1. Hamstring tendinitis  M76.899 CT Trigger Point Injection ([facility])     celecoxib (CELEBREX) 200 MG capsule     PHYSIATRY REFERRAL      Discussed. Will repeat trigger point injection and also set up to see PMR due to the recurrent presentation  of this. Celebrex for pain now. Pt to monitor sitting positon and her exercises to see if anything may be exacerbating her pain and tendonitis.   Discussed in length conservative measures of OTC medications for pain, Ice/Heat, elevation and the concept of R.I.C.E.. Continue behavioral changes and proper body mechanics to allow for healing. Follow up as directed.           No follow-ups on file.    Carlos Hart MD  [facility] - [place]    Phone call duration:  9 minutes

## 2020-08-19 ASSESSMENT — ANXIETY QUESTIONNAIRES: GAD7 TOTAL SCORE: 0

## 2020-09-17 ENCOUNTER — OFFICE VISIT (OUTPATIENT)
Dept: FAMILY MEDICINE | Facility: OTHER | Age: 50
End: 2020-09-17
Attending: FAMILY MEDICINE
Payer: COMMERCIAL

## 2020-09-17 DIAGNOSIS — Z01.818 PRE-OP EXAM: Primary | ICD-10-CM

## 2020-09-17 PROCEDURE — U0003 INFECTIOUS AGENT DETECTION BY NUCLEIC ACID (DNA OR RNA); SEVERE ACUTE RESPIRATORY SYNDROME CORONAVIRUS 2 (SARS-COV-2) (CORONAVIRUS DISEASE [COVID-19]), AMPLIFIED PROBE TECHNIQUE, MAKING USE OF HIGH THROUGHPUT TECHNOLOGIES AS DESCRIBED BY CMS-2020-01-R: HCPCS | Performed by: FAMILY MEDICINE

## 2020-09-18 LAB
SARS-COV-2 RNA SPEC QL NAA+PROBE: NOT DETECTED
SPECIMEN SOURCE: NORMAL

## 2020-09-21 ENCOUNTER — HOSPITAL ENCOUNTER (OUTPATIENT)
Facility: HOSPITAL | Age: 50
Discharge: HOME | End: 2020-09-21
Attending: RADIOLOGY | Admitting: RADIOLOGY
Payer: COMMERCIAL

## 2020-09-21 ENCOUNTER — HOSPITAL ENCOUNTER (OUTPATIENT)
Dept: GENERAL RADIOLOGY | Facility: HOSPITAL | Age: 50
End: 2020-09-21
Attending: FAMILY MEDICINE | Admitting: RADIOLOGY
Payer: COMMERCIAL

## 2020-09-21 DIAGNOSIS — M76.899 HAMSTRING TENDINITIS: ICD-10-CM

## 2020-09-21 PROCEDURE — 25000128 H RX IP 250 OP 636: Performed by: RADIOLOGY

## 2020-09-21 PROCEDURE — 25000125 ZZHC RX 250: Performed by: RADIOLOGY

## 2020-09-21 PROCEDURE — 77002 NEEDLE LOCALIZATION BY XRAY: CPT | Mod: TC

## 2020-09-21 RX ORDER — LIDOCAINE HYDROCHLORIDE 10 MG/ML
INJECTION, SOLUTION EPIDURAL; INFILTRATION; INTRACAUDAL; PERINEURAL
Status: DISCONTINUED
Start: 2020-09-21 | End: 2020-09-22 | Stop reason: HOSPADM

## 2020-09-21 RX ORDER — METHYLPREDNISOLONE ACETATE 80 MG/ML
80 INJECTION, SUSPENSION INTRA-ARTICULAR; INTRALESIONAL; INTRAMUSCULAR; SOFT TISSUE ONCE
Status: COMPLETED | OUTPATIENT
Start: 2020-09-21 | End: 2020-09-21

## 2020-09-21 RX ORDER — METHYLPREDNISOLONE ACETATE 80 MG/ML
INJECTION, SUSPENSION INTRA-ARTICULAR; INTRALESIONAL; INTRAMUSCULAR; SOFT TISSUE
Status: DISPENSED
Start: 2020-09-21 | End: 2020-09-21

## 2020-09-21 RX ADMIN — LIDOCAINE HYDROCHLORIDE 5 ML: 10 INJECTION, SOLUTION EPIDURAL; INFILTRATION; INTRACAUDAL; PERINEURAL at 12:15

## 2020-09-21 RX ADMIN — METHYLPREDNISOLONE ACETATE 80 MG: 80 INJECTION, SUSPENSION INTRA-ARTICULAR; INTRALESIONAL; INTRAMUSCULAR; SOFT TISSUE at 12:15

## 2020-09-21 NOTE — IP AVS SNAPSHOT
[address] [address]  Phone:  [phone]                                    After Visit Summary   9/21/2020    Kacy Valdez    MRN: [id number]           After Visit Summary Signature Page    I have received my discharge instructions, and my questions have been answered. I have discussed any challenges I see with this plan with the nurse or doctor.    ..........................................................................................................................................  Patient/Patient Representative Signature      ..........................................................................................................................................  Patient Representative Print Name and Relationship to Patient    ..................................................               ................................................  Date                                   Time    ..........................................................................................................................................  Reviewed by Signature/Title    ...................................................              ..............................................  Date                                               Time          22EPIC Rev 08/18

## 2020-09-21 NOTE — DISCHARGE INSTRUCTIONS
Cell number on file:    Telephone Information:   Mobile [phone]     Is it ok to leave a message at this number(s)? Yes    Dr. Brandt completed your procedure on 9/21/2020.    Current Pain Level (0-10 Scale): 7/10  Post Pain Level (0-10):  0/10    Radiology Discharge instructions for Steroid Injection    Activity Level:     Do not do any heavy activity or exercise for 24 hours.   Do not drive for 4 hours after your injection.  Diet:   Return to your normal diet.  Medications:   If you have stopped taking your Aspirin, Coumadin/Warfarin, Ibuprofen, or any   other blood thinner for this procedure you may resume in the morning unless   your primary care provider has given you other instructions.    Diabetics may see an increase in blood sugar after steroid injections. If you are concerned about your blood sugar, please contact your family doctor.    Site Care:  Remove the bandage and bathe or shower the morning after the procedure.      This is a Pain Management procedure.  You will be contacted in two weeks for follow up.    Call your Primary Care Provider if you have the following (if your primary care provider is not available please seek emergency care):   Nausea with vomiting   Severe headache   Drowsiness or confusion   Redness or drainage at the injection or puncture site   Temperature over 101 degrees F   Other concerns   Worsening back pain   Stiff neck

## 2020-10-01 ENCOUNTER — MYC MEDICAL ADVICE (OUTPATIENT)
Dept: FAMILY MEDICINE | Facility: OTHER | Age: 50
End: 2020-10-01

## 2020-10-01 DIAGNOSIS — M76.899 HAMSTRING TENDINITIS: Primary | ICD-10-CM

## 2020-10-07 ENCOUNTER — TELEPHONE (OUTPATIENT)
Dept: GENERAL RADIOLOGY | Facility: HOSPITAL | Age: 50
End: 2020-10-07

## 2020-10-13 ENCOUNTER — TELEPHONE (OUTPATIENT)
Dept: INTERVENTIONAL RADIOLOGY/VASCULAR | Facility: HOSPITAL | Age: 50
End: 2020-10-13

## 2020-10-13 NOTE — TELEPHONE ENCOUNTER
CONSULT PATIENT  PAIN INJECTION POST CALL    Procedure: Trigger point injection, targeting left ischial tuberosity  Radiologist(s): Dr. Rei Brandt  Date of Procedure: 9-21-20    The patient had no questions or concerns.    Pre-procedure pain score was: 7 (See pre-procedure score)  Post-procedure pain score as of today is: 1  What % relief?85%    Would you say this injection has been beneficial? Yes   If yes, for how long? Injection is currently relieving pain, pt states it took a while but now she feels good.    Was there one injection that worked better than the other? No     Where is the pain? Patient states she is not really having any pain.  Can you describe the pain?n/a  Does the pain radiate anywhere?n/a  If yes, where does it radiate and where does the pain stop?n/a    Is this new pain? No    Patient would not like to pursue another injection at this time.  The patient will contact IR at [phone] if that changes.      Erica Brandon

## 2021-01-09 DIAGNOSIS — F41.0 PANIC DISORDER WITHOUT AGORAPHOBIA: ICD-10-CM

## 2021-01-11 RX ORDER — PAROXETINE 10 MG/1
TABLET, FILM COATED ORAL
Qty: 90 TABLET | Refills: 3 | Status: SHIPPED | OUTPATIENT
Start: 2021-01-11 | End: 2022-01-26

## 2021-01-11 NOTE — TELEPHONE ENCOUNTER
paxil      Last Written Prescription Date:  1/8/20  Last Fill Quantity: 90,   # refills: 3  Last Office Visit: 8/18/20  Future Office visit:

## 2021-01-15 ENCOUNTER — HEALTH MAINTENANCE LETTER (OUTPATIENT)
Age: 51
End: 2021-01-15

## 2021-02-03 ENCOUNTER — ANCILLARY PROCEDURE (OUTPATIENT)
Dept: GENERAL RADIOLOGY | Facility: OTHER | Age: 51
End: 2021-02-03
Attending: FAMILY MEDICINE
Payer: COMMERCIAL

## 2021-02-03 ENCOUNTER — OFFICE VISIT (OUTPATIENT)
Dept: FAMILY MEDICINE | Facility: OTHER | Age: 51
End: 2021-02-03
Attending: FAMILY MEDICINE
Payer: COMMERCIAL

## 2021-02-03 VITALS
BODY MASS INDEX: 19.46 KG/M2 | HEART RATE: 59 BPM | HEIGHT: 64 IN | DIASTOLIC BLOOD PRESSURE: 68 MMHG | OXYGEN SATURATION: 98 % | SYSTOLIC BLOOD PRESSURE: 130 MMHG | TEMPERATURE: 97.9 F | WEIGHT: 114 LBS

## 2021-02-03 DIAGNOSIS — M25.511 CHRONIC RIGHT SHOULDER PAIN: ICD-10-CM

## 2021-02-03 DIAGNOSIS — G89.29 CHRONIC RIGHT SHOULDER PAIN: ICD-10-CM

## 2021-02-03 DIAGNOSIS — Z23 NEED FOR PROPHYLACTIC VACCINATION AND INOCULATION AGAINST INFLUENZA: ICD-10-CM

## 2021-02-03 DIAGNOSIS — M76.899 HAMSTRING TENDINITIS: ICD-10-CM

## 2021-02-03 DIAGNOSIS — Z23 NEED FOR VACCINATION: Primary | ICD-10-CM

## 2021-02-03 PROCEDURE — 90471 IMMUNIZATION ADMIN: CPT | Performed by: FAMILY MEDICINE

## 2021-02-03 PROCEDURE — 73030 X-RAY EXAM OF SHOULDER: CPT | Mod: TC | Performed by: RADIOLOGY

## 2021-02-03 PROCEDURE — 90472 IMMUNIZATION ADMIN EACH ADD: CPT | Performed by: FAMILY MEDICINE

## 2021-02-03 PROCEDURE — 90682 RIV4 VACC RECOMBINANT DNA IM: CPT | Performed by: FAMILY MEDICINE

## 2021-02-03 PROCEDURE — 90750 HZV VACC RECOMBINANT IM: CPT | Performed by: FAMILY MEDICINE

## 2021-02-03 PROCEDURE — 99215 OFFICE O/P EST HI 40 MIN: CPT | Mod: 25 | Performed by: FAMILY MEDICINE

## 2021-02-03 ASSESSMENT — MIFFLIN-ST. JEOR: SCORE: 1112.1

## 2021-02-03 ASSESSMENT — PAIN SCALES - GENERAL: PAINLEVEL: MODERATE PAIN (5)

## 2021-02-03 NOTE — PROGRESS NOTES
Assessment & Plan     Need for vaccination  Shot given   - SHINGRIX [[id number]]    Need for prophylactic vaccination and inoculation against influenza  Shot given   - [facility] RIV4 (FLUBLOK) VACCINE RECOMBINANT DNA PRSRV ANTIBIO FREE, IM [[id number]]    Chronic right shoulder pain  Discussed. Has impingement and no improvement with conservative mgmt- aleve/rest/ice /heat etc.  Will order XR and MRI then go from there. Pt agrees.    - MR Shoulder Right w/o Contrast; Future  - XR Shoulder Right G/E 3 Views; Future    Hamstring tendinitis  Discussed after reviewing notes.  PMR not real helpful. Will order steroid shot and spoke with Marylou at PT - she will specialize a treatment plan with her.   - IR Trigger Point Inj 1 Or 2 Muscles; Future  - PHYSICAL THERAPY REFERRAL; Future    Review of external notes as documented above   Independent interpretation of a test performed by another physician/other qualified health care professional (not separately reported) - by PMR  Called PT and spoke with Therapist Marylou        47 minutes spent on the date of the encounter doing chart review, history and exam, documentation and further activities as noted above           See Patient Instructions    No follow-ups on file.    Carlos Hart MD  [facility] - ELI Woorduff is a 50 year old who presents to clinic today for the following health issues     HPI       Musculoskeletal problem/pain  Onset/Duration: 6 months  Description  Location: shoulder - right  Joint Swelling: no  Redness: no  Pain: YES  Warmth: no  Intensity:  moderate  Progression of Symptoms:  worsening and constant  Accompanying signs and symptoms:   Fevers: no  Numbness/tingling/weakness: no  History  Trauma to the area: no  Recent illness:  no  Previous similar problem: no  Previous evaluation:  no  Precipitating or alleviating factors:  Aggravating factors include: lifting, exercise, overuse and sleeping  Therapies tried and outcome: NSAID  "- aleve    No trauma  On and off pain and decrease ROM and constant pain for last 2 months   Hard time sleeping on it      PT IS STILL HAVING ISSUES WITH LEFT HAMSTRING REGION - SEEN PMR AND HAD US DONE AT [facility]--- SEE CARE EVERYWHERE   GETS GREAT RELIEF FROM STEROID SHOTS BUT COMES BACK IN 3-4 MONTHS  HAS NOT TRIED PT.   HAS CUT DOWN ON RUNNING     Exam Accession# [id number]        Diagnostic ultrasound of the left femur - attention to the proximal hamstring tendons        INDICATIONS: Left hamstring pain, evaluate for tear        TECHNICAL: linear array 12-5 MHz transducer        FINDINGS: No correlative imaging.         There is mild cortical irregularity of the ischial tuberosity. There is increased hypoechogenicity and irregularity of the conjoined tendon with the presence of a small linear intrasubstance tear. The tear measures approximately 9.8 mm.        The distal attachments of the semimembranosus, semitendinosis, and biceps femoris tendons are intact at the level of the knee.        The adductor muscle group is intact including the ishiocondylar portion of the adductor rolo.        CONCLUSION: Tendinosis of the proximal hamstring at the level of the conjoined tendon with the presence of a small linear intrasubstance tear without retraction        Electronically Signed: Natasha Grier MD 10/20/2020 5:37 AM       POWERSCRIBE           Review of Systems   Constitutional, HEENT, cardiovascular, pulmonary, gi and gu systems are negative, except as otherwise noted.      Objective    /68   Pulse 59   Temp 97.9  F (36.6  C)   Ht 1.626 m (5' 4\")   Wt 51.7 kg (114 lb)   SpO2 98%   BMI 19.57 kg/m    Body mass index is 19.57 kg/m .  Physical Exam   GENERAL: healthy, alert and no distress  MS: right shoulder -- impingement sign at 150 degrees forward and lateral flexion with some improvement on supination.  Passive ROM also painful  no gross musculoskeletal defects noted, no edema                "

## 2021-02-03 NOTE — NURSING NOTE
"Chief Complaint   Patient presents with     Shoulder Pain       Initial /68   Pulse 59   Temp 97.9  F (36.6  C)   Ht 1.626 m (5' 4\")   Wt 51.7 kg (114 lb)   SpO2 98%   BMI 19.57 kg/m   Estimated body mass index is 19.57 kg/m  as calculated from the following:    Height as of this encounter: 1.626 m (5' 4\").    Weight as of this encounter: 51.7 kg (114 lb).  Medication Reconciliation: complete  Shaw Fu LPN  "

## 2021-02-19 ENCOUNTER — TELEPHONE (OUTPATIENT)
Dept: FAMILY MEDICINE | Facility: OTHER | Age: 51
End: 2021-02-19

## 2021-02-19 NOTE — TELEPHONE ENCOUNTER
Have someone call her - and reschedule - this is not like her but has a son who has surgical issue.

## 2021-02-22 ENCOUNTER — TELEPHONE (OUTPATIENT)
Dept: GENERAL RADIOLOGY | Facility: HOSPITAL | Age: 51
End: 2021-02-22

## 2021-02-22 NOTE — TELEPHONE ENCOUNTER
Patient was called to see if she could come at 1:30 in afternoon for her appointment on 2/24.  This was good for her.  Appointment switched to 1:30 to allow for STAT procedure in schedule in IR.

## 2021-02-24 ENCOUNTER — HOSPITAL ENCOUNTER (OUTPATIENT)
Dept: INTERVENTIONAL RADIOLOGY/VASCULAR | Facility: HOSPITAL | Age: 51
End: 2021-02-24
Attending: FAMILY MEDICINE
Payer: COMMERCIAL

## 2021-02-24 ENCOUNTER — HOSPITAL ENCOUNTER (OUTPATIENT)
Facility: HOSPITAL | Age: 51
Discharge: HOME | End: 2021-02-24
Attending: RADIOLOGY | Admitting: RADIOLOGY
Payer: COMMERCIAL

## 2021-02-24 DIAGNOSIS — M76.899 HAMSTRING TENDINITIS: ICD-10-CM

## 2021-02-24 PROCEDURE — 250N000011 HC RX IP 250 OP 636: Performed by: RADIOLOGY

## 2021-02-24 PROCEDURE — 96372 THER/PROPH/DIAG INJ SC/IM: CPT | Performed by: RADIOLOGY

## 2021-02-24 PROCEDURE — 20552 NJX 1/MLT TRIGGER POINT 1/2: CPT

## 2021-02-24 PROCEDURE — 250N000009 HC RX 250: Performed by: RADIOLOGY

## 2021-02-24 RX ORDER — METHYLPREDNISOLONE ACETATE 80 MG/ML
INJECTION, SUSPENSION INTRA-ARTICULAR; INTRALESIONAL; INTRAMUSCULAR; SOFT TISSUE
Status: DISCONTINUED
Start: 2021-02-24 | End: 2021-02-25 | Stop reason: HOSPADM

## 2021-02-24 RX ORDER — IOPAMIDOL 612 MG/ML
50 INJECTION, SOLUTION INTRAVASCULAR ONCE
Status: DISCONTINUED | OUTPATIENT
Start: 2021-02-24 | End: 2021-02-24 | Stop reason: CLARIF

## 2021-02-24 RX ORDER — METHYLPREDNISOLONE ACETATE 80 MG/ML
80 INJECTION, SUSPENSION INTRA-ARTICULAR; INTRALESIONAL; INTRAMUSCULAR; SOFT TISSUE ONCE
Status: COMPLETED | OUTPATIENT
Start: 2021-02-24 | End: 2021-02-24

## 2021-02-24 RX ORDER — LIDOCAINE HYDROCHLORIDE 10 MG/ML
INJECTION, SOLUTION EPIDURAL; INFILTRATION; INTRACAUDAL; PERINEURAL
Status: DISCONTINUED
Start: 2021-02-24 | End: 2021-02-25 | Stop reason: HOSPADM

## 2021-02-24 RX ADMIN — LIDOCAINE HYDROCHLORIDE 5 ML: 10 INJECTION, SOLUTION EPIDURAL; INFILTRATION; INTRACAUDAL; PERINEURAL at 13:57

## 2021-02-24 RX ADMIN — METHYLPREDNISOLONE ACETATE 80 MG: 80 INJECTION, SUSPENSION INTRA-ARTICULAR; INTRALESIONAL; INTRAMUSCULAR; SOFT TISSUE at 13:57

## 2021-02-24 NOTE — IP AVS SNAPSHOT
[facility]  [address] [address]  Phone: [phone]  Fax: [phone]                                    After Visit Summary   2/24/2021    Kacy Valdez    MRN: [id number]           After Visit Summary Signature Page    I have received my discharge instructions, and my questions have been answered. I have discussed any challenges I see with this plan with the nurse or doctor.    ..........................................................................................................................................  Patient/Patient Representative Signature      ..........................................................................................................................................  Patient Representative Print Name and Relationship to Patient    ..................................................               ................................................  Date                                   Time    ..........................................................................................................................................  Reviewed by Signature/Title    ...................................................              ..............................................  Date                                               Time          22EPIC Rev 08/18

## 2021-02-24 NOTE — DISCHARGE INSTRUCTIONS
Cell number on file:    Telephone Information:   Mobile [phone]     Is it ok to leave a message at this number(s)? Yes    Dr. Brandt completed your procedure on 2/24/2021.    Current Pain Level (0-10 Scale): 7/10  Post Pain Level (0-10):  0/10    Radiology Discharge instructions for Steroid Injection    Activity Level:     Do not do any heavy activity or exercise for 24 hours.   Do not drive for 4 hours after your injection.  Diet:   Return to your normal diet.  Medications:   If you have stopped taking your Aspirin, Coumadin/Warfarin, Ibuprofen, or any   other blood thinner for this procedure you may resume in the morning unless   your primary care provider has given you other instructions.    Diabetics may see an increase in blood sugar after steroid injections. If you are concerned about your blood sugar, please contact your family doctor.    Site Care:  Remove the bandage and bathe or shower the morning after the procedure.      Please allow two weeks to experience improvement in your pain.  If you have any further issues, please contact your provider.    Call your Primary Care Provider if you have the following (if your primary care provider is not available please seek emergency care):   Nausea with vomiting   Severe headache   Drowsiness or confusion   Redness or drainage at the injection or puncture site   Temperature over 101 degrees F   Other concerns   Worsening back pain   Stiff neck

## 2021-03-05 ENCOUNTER — HOSPITAL ENCOUNTER (OUTPATIENT)
Dept: PHYSICAL THERAPY | Facility: HOSPITAL | Age: 51
Setting detail: THERAPIES SERIES
End: 2021-03-05
Attending: FAMILY MEDICINE
Payer: COMMERCIAL

## 2021-03-05 DIAGNOSIS — M76.899 HAMSTRING TENDINITIS: ICD-10-CM

## 2021-03-05 PROCEDURE — 97161 PT EVAL LOW COMPLEX 20 MIN: CPT | Mod: GP

## 2021-03-05 PROCEDURE — 97110 THERAPEUTIC EXERCISES: CPT | Mod: GP

## 2021-03-05 NOTE — PROGRESS NOTES
[facility] Evaluations       Name: Kacy Valdez MRN# [id number]   Age: 51 year old YOB: 1970     Date of Consultation: March 5, 2021  Primary care provider: Carlos Hart    Referring Physician: Dr. Hart   Orders: Eval and Treat  Medical Diagnosis: Hamstring tendinitis [M76.899]   WITH [facility] - ARRANGE APPT AFTER STEROID SHOT WHICH HAS BEEN ORDERED THRU RADIOLOGY  Onset of Illness/Injury: ongoing for 2 years    Reason for PT Visit: Tightness in HS on L LE for a long time. 2 years ago was running and slipped on ice. No pain while running, next day could hardly walk. Had PT in past with emphasis on glut and hip. One year ago or so symptoms were the most aggressive. Patient was not able to sit on ishisum and HS would get very tense. With US imaging proximal HS tendinosis noted. Injections started and these provided greatest relief. Symptom improvement lasts until 4 months or so post injection and then symptoms return. Patient has had 3 injections total with symptom relief each time. Most recent injection was 2/24/21. Patient is looking to get full resolution and be able to return to running and yoga practice.     When flared patient has pain with sitting, walking, standing, stairs performance and sleeping. Patient has regular exercise routine including yoga and running. Patient has backed off on yoga practice as any stretching to HS region seems to increase pain symptoms. Any targeted stretching seems to create more irritation at the HS . Patient is still running, but less in the winter. Decreased frequency and distance compared to norms. Patient has had previous episodes of PT without resolution of symptoms in past year.     Patient also has been having R shoulder pain. Is R handed. Has been present for years, but increased in last year. Insidious onset. Has pain and decreased ability to perform reaching, lifting, carrying. Difficulty with overhead activities.      Patient has pain in ishium when sleeping and also in shoulder. Pain with side sleeping and also with sleeping on     Prior Level of Function: was able to run and perform yoga without pain. Able to move R arm in all motions and perform lifting and reaching without pain.     Community Support/Living Environment/Employment History: Employed at [employer]. Performs prolonged sitting at work.     Patient/Family Goal: to be painfree. Return to running, yoga.     Fall Screen:   Have you fallen 2 or more times in the last year? No  Have you fallen and had an injury in the last year? No  Timed up & go: NT  Is patient a fall risk? No    Past Medical History:   Past Medical History:   Diagnosis Date     Carcinoma in situ of cervix uteri 05/19/2009     Dysthymic disorder 8/8/2001     Other acne 1/10/2002     Panic disorder without agoraphobia 1/3/2011     Papanicolaou smear of cervix with high grade squamous intraepithelial lesion (HGSIL) 4/27/2009     Unspecified sinusitis (chronic) 6/5/2003       Past Surgical History:  Past Surgical History:   Procedure Laterality Date     ------------OTHER-------------  2009    LEEP     ------------OTHER-------------  2005    IUD     BIOPSY BREAST      breast lump     BUNIONECTOMY  2010     COLONOSCOPY N/A 10/4/2018    Repeat in 2028//Procedure: COLONOSCOPY;  COLONSCOPY with Polypectomy;  Surgeon: Jese Rizzo MD;  Location: [facility]     IR CONSULTATION FOR IR EXAM  1/10/2020     IR TRIGGER POINT INJ 1 OR 2 MUSCLES  1/29/2020     IR TRIGGER POINT INJ 1 OR 2 MUSCLES  9/21/2020     IR TRIGGER POINT INJ 1 OR 2 MUSCLES  2/24/2021       Medications:   Current Outpatient Medications   Medication Sig     celecoxib (CELEBREX) 200 MG capsule Take 1 capsule (200 mg) by mouth daily     EPINEPHrine (EPIPEN/ADRENACLICK/OR ANY BX GENERIC EQUIV) 0.3 MG/0.3ML injection 2-pack INJECT 0.3 ML (0.3 MG) INTO THE MUSCLE AS NEEDED FOR ANAPHYLAXIS     levonorgestrel (MIRENA) 20 MCG/24HR IUD 1 each (20 mcg) by  Intrauterine route continuous     nystatin-triamcinolone (MYCOLOG II) 130646-1.1 UNIT/GM-% external cream Apply small amt to area nightly     PARoxetine (PAXIL) 10 MG tablet Take 1 tablet by mouth once daily     No current facility-administered medications for this encounter.        Imaging: US MSK HIP LEFT COMPLETE (10/06/2020 2:53 PM CDT)  US MSK HIP LEFT COMPLETE (10/06/2020 2:53 PM CDT)   Specimen         US MSK HIP LEFT COMPLETE (10/06/2020 2:53 PM CDT)   Narrative Performed At   This document is currently in Final Status        Exam Accession# [id number]        Diagnostic ultrasound of the left femur - attention to the proximal hamstring tendons        INDICATIONS: Left hamstring pain, evaluate for tear        TECHNICAL: linear array 12-5 MHz transducer        FINDINGS: No correlative imaging.         There is mild cortical irregularity of the ischial tuberosity. There is increased hypoechogenicity and irregularity of the conjoined tendon with the presence of a small linear intrasubstance tear. The tear measures approximately 9.8 mm.        The distal attachments of the semimembranosus, semitendinosis, and biceps femoris tendons are intact at the level of the knee.        The adductor muscle group is intact including the ishiocondylar portion of the adductor rolo.        CONCLUSION: Tendinosis of the proximal hamstring at the level of the conjoined tendon with the presence of a small linear intrasubstance tear without retraction        Electronically Signed: Natasha Grier MD 10/20/2020 5:37 AM            Musculoskeletal Findings:   OBJECTIVE  Pain at rest: 3/10  Pain with activity: 8/10  Symptoms: sharp, aching, buring  Aggravating Factors: : sitting, walking, certain positions, bending, household tasks, exercise and stretching   Reduced pain with: rest, injection, certain positions, heat      Numbness or tingling in LEs or UEs: No    Palpation: tender to palpation over proximal L  HS tendon, diffuse  tenderness at ischial tuberosity. Non-tender to palpation of distal HS tendon.   Tenderness at R supraspinatus      Gait: Normal, even stride length and good miguelito present.      Functional mobility   Single leg stance and single leg stance with eyes closed. Good stability present bilaterally. Squat assessment: mild weight shift to R LE. Good mobility noted. No other deviations. No increased pain with performance.      Posture: Normal erect.  Sitting Posture: good  Standing Posture: good  Hip Assessment:  ROM Screen: mild loss of passive hip flexion, Full ER, IR, extension.   Strength assessment: performed testing of hip ER and IR as well as LE MMT in 90/90 full strength for all motions except for knee flexion and hip extension (hamstring). Painful and 4/5.   Prone assessment revealed: pain and slight decrease in strength for hip extension with knee extension. Increased weakness compared to R LE with performance of hip extension with knee flexion and knee flexion 4-/5 with pain.   HS length: SLR over full motion on R LE. L LE at 80% of normal motion with pain reproduction and mild tension.   Distal HS: distal HS motion full R LE, 80% with pain reproduction at ischial tuberosity.   Trendelenburg: negative     LUMBAR screen:  Full active standing lumbar motion. Forward flexion test produced tension in L HS. But no loss of motion and No Worse after performance. Lumbar extension, R and L side glide full and pain free. Segmental mobility WNL at lumbar and lower thoracic segments. Prone extension, full and pain free.     SI Screen:  Stork test: negative for somatic dysfunction.   Leg lengths even.  SI mobile bilaterally  SI gapping negative      Impression of LE findings: irritation and weakness of L HS with site of irritation at proximal HS attachment. Hip stabilizers are strong. Mild limitation in HS length and hip flexion motion of L LE. Patient would benefit from first isometric targeting of HS with progressing to  eccentric and eventual progressing to weight bearing and dynamic movements to remodel and strengthen tissues impacted by proximal HS tendinosis.     Shoulder:  AROM: R shoulder ROM 80% with flexion and abduction with scap hike and pain reproduction. ER 90%. IR full and relatively painfree.   Strength assessment: 4/5 supraspinatus. Flexion and abduction 4+/5. ER and IR: 5/5  Speed's Negative  Empty Can: Negative.     Impression of UE findings: impingement present on R shoulder with involvement of supraspinatus. Should respond well to restoration of motion and mechanics with RTC specific strengthening if indicated.      Patient's Concordant Sign: Hip: proximal L HS pain/ishial pain with prolonged sitting, sleep, walking, running, yoga, exercise and stairs performance  Shoulder: pain and limited motion with overhead motions, reaching, lifting, carrying and pain with sleep.       Outcome Measures:   Lower Extremity Functional Scale: 59/80    Goals: 2-4 weeks  STG 1: Patient will be independent and compliant with HEP.   STG 2: Patient will report decreased proximal HS/ishial pain with sitting, walking, standing and stairs performance.   STG 3: Patient will display full and painfree R shoulder motion.     LT-10 weeks  LTG 1: Patient will be able to perform prolonged sitting, walking, stairs without limitation of L proximal HS/ischial pain.   LTG 2: Patient will be able to consistently sleep through the night without pain from L proximal HS/ischial pain or R shoulder pain.   LTG 3: Patient will be able to return to running activities and yoga practice without limitation from L proximal HS/ischial pain.   LTG 4: Patient will be able to perform overhead R shoulder motions and pain free lifting, reaching and carrying.   LTG 5: Patient will be able to independent progress HEP for continued long term management of symptoms.     Planned Interventions: therapeutic exercise, manual techniques, home program development,  kinesiotaping, dry needling and adjunct interventions as needed.     Clinical Impressions:  Criteria for Skilled Therapeutic Intervention Met: Yes  PT Diagnosis: Proximal L HS tendinosis, R shoulder impingement with supraspinatus involvement   Influenced by the following impairments: pain, weakness, muscle length limitation, loss of mobility  Functional limitations due to impairment: pain with prolonged sitting, standing, walking, stairs, running and yoga. Pain and limited motion for overhead R shoulder activities and carrying, lifting and reaching.   Clinical presentation: Stable/Uncomplicated  Clinical presentation rationale: clinical judgement   Clinical Decision making (complexity): Moderate Complexity  Predicted Duration of Therapy Intervention (days/wks): 8-10 weeks  Risks and Benefits of therapy have been explained: Yes  Patient, Family & other staff in agreement with plan of care: Yes  Comments: PT services will be addressing both L LE and R UE presentation based on today's findings. Patient is a good candidate for PT services. Will start with PT frequency of 1x/week and adjust as appropriate.     Total Evaluation Time: 30     I certify the need for these services furnished under this plan of treatment and while under my care. (Physician co-signature of this document indicates review and certification of the therapy plan).

## 2021-03-09 ENCOUNTER — HOSPITAL ENCOUNTER (OUTPATIENT)
Dept: MRI IMAGING | Facility: HOSPITAL | Age: 51
Discharge: HOME | End: 2021-03-09
Attending: FAMILY MEDICINE | Admitting: FAMILY MEDICINE
Payer: COMMERCIAL

## 2021-03-09 DIAGNOSIS — G89.29 CHRONIC RIGHT SHOULDER PAIN: ICD-10-CM

## 2021-03-09 DIAGNOSIS — M25.511 CHRONIC RIGHT SHOULDER PAIN: ICD-10-CM

## 2021-03-09 PROCEDURE — 73221 MRI JOINT UPR EXTREM W/O DYE: CPT | Mod: RT

## 2021-03-10 ENCOUNTER — HOSPITAL ENCOUNTER (OUTPATIENT)
Dept: PHYSICAL THERAPY | Facility: HOSPITAL | Age: 51
Setting detail: THERAPIES SERIES
End: 2021-03-10
Attending: FAMILY MEDICINE
Payer: COMMERCIAL

## 2021-03-10 DIAGNOSIS — S43.431A LABRAL TEAR OF SHOULDER, RIGHT, INITIAL ENCOUNTER: ICD-10-CM

## 2021-03-10 DIAGNOSIS — M75.81 RIGHT ROTATOR CUFF TENDINITIS: Primary | ICD-10-CM

## 2021-03-10 PROCEDURE — 97110 THERAPEUTIC EXERCISES: CPT | Mod: GP

## 2021-03-19 ENCOUNTER — HOSPITAL ENCOUNTER (OUTPATIENT)
Dept: PHYSICAL THERAPY | Facility: HOSPITAL | Age: 51
Setting detail: THERAPIES SERIES
End: 2021-03-19
Attending: FAMILY MEDICINE
Payer: COMMERCIAL

## 2021-03-19 PROCEDURE — 97110 THERAPEUTIC EXERCISES: CPT | Mod: GP

## 2021-03-21 ENCOUNTER — HEALTH MAINTENANCE LETTER (OUTPATIENT)
Age: 51
End: 2021-03-21

## 2021-03-23 ENCOUNTER — ANCILLARY PROCEDURE (OUTPATIENT)
Dept: MAMMOGRAPHY | Facility: OTHER | Age: 51
End: 2021-03-23
Attending: FAMILY MEDICINE
Payer: COMMERCIAL

## 2021-03-23 DIAGNOSIS — Z12.31 VISIT FOR SCREENING MAMMOGRAM: ICD-10-CM

## 2021-03-23 PROCEDURE — 77063 BREAST TOMOSYNTHESIS BI: CPT | Mod: TC | Performed by: RADIOLOGY

## 2021-03-23 PROCEDURE — 77067 SCR MAMMO BI INCL CAD: CPT | Mod: TC | Performed by: RADIOLOGY

## 2021-03-26 ENCOUNTER — HOSPITAL ENCOUNTER (OUTPATIENT)
Dept: PHYSICAL THERAPY | Facility: HOSPITAL | Age: 51
Setting detail: THERAPIES SERIES
End: 2021-03-26
Attending: FAMILY MEDICINE
Payer: COMMERCIAL

## 2021-03-26 PROCEDURE — 97110 THERAPEUTIC EXERCISES: CPT | Mod: GP

## 2021-03-31 ENCOUNTER — HOSPITAL ENCOUNTER (OUTPATIENT)
Dept: PHYSICAL THERAPY | Facility: HOSPITAL | Age: 51
Setting detail: THERAPIES SERIES
End: 2021-03-31
Attending: FAMILY MEDICINE
Payer: COMMERCIAL

## 2021-03-31 PROCEDURE — 97110 THERAPEUTIC EXERCISES: CPT | Mod: GP

## 2021-04-09 ENCOUNTER — HOSPITAL ENCOUNTER (OUTPATIENT)
Dept: PHYSICAL THERAPY | Facility: HOSPITAL | Age: 51
Setting detail: THERAPIES SERIES
End: 2021-04-09
Attending: FAMILY MEDICINE
Payer: COMMERCIAL

## 2021-04-09 PROCEDURE — 97110 THERAPEUTIC EXERCISES: CPT | Mod: GP

## 2021-04-14 ENCOUNTER — TRANSFERRED RECORDS (OUTPATIENT)
Dept: HEALTH INFORMATION MANAGEMENT | Facility: CLINIC | Age: 51
End: 2021-04-14

## 2021-04-23 ENCOUNTER — HOSPITAL ENCOUNTER (OUTPATIENT)
Dept: PHYSICAL THERAPY | Facility: HOSPITAL | Age: 51
Setting detail: THERAPIES SERIES
End: 2021-04-23
Attending: FAMILY MEDICINE
Payer: COMMERCIAL

## 2021-04-23 PROCEDURE — 97110 THERAPEUTIC EXERCISES: CPT | Mod: GP

## 2021-04-28 ENCOUNTER — HOSPITAL ENCOUNTER (OUTPATIENT)
Dept: PHYSICAL THERAPY | Facility: HOSPITAL | Age: 51
Setting detail: THERAPIES SERIES
End: 2021-04-28
Attending: FAMILY MEDICINE
Payer: COMMERCIAL

## 2021-04-28 PROCEDURE — 97140 MANUAL THERAPY 1/> REGIONS: CPT | Mod: GP

## 2021-05-05 ENCOUNTER — HOSPITAL ENCOUNTER (OUTPATIENT)
Dept: PHYSICAL THERAPY | Facility: HOSPITAL | Age: 51
Setting detail: THERAPIES SERIES
End: 2021-05-05
Attending: FAMILY MEDICINE
Payer: COMMERCIAL

## 2021-05-05 PROCEDURE — 97110 THERAPEUTIC EXERCISES: CPT | Mod: GP

## 2021-05-12 ENCOUNTER — HOSPITAL ENCOUNTER (OUTPATIENT)
Dept: PHYSICAL THERAPY | Facility: HOSPITAL | Age: 51
Setting detail: THERAPIES SERIES
End: 2021-05-12
Attending: FAMILY MEDICINE
Payer: COMMERCIAL

## 2021-05-12 PROCEDURE — 97140 MANUAL THERAPY 1/> REGIONS: CPT | Mod: GP

## 2021-05-12 PROCEDURE — 97110 THERAPEUTIC EXERCISES: CPT | Mod: GP

## 2021-05-21 ENCOUNTER — HOSPITAL ENCOUNTER (OUTPATIENT)
Dept: PHYSICAL THERAPY | Facility: HOSPITAL | Age: 51
Setting detail: THERAPIES SERIES
End: 2021-05-21
Attending: FAMILY MEDICINE
Payer: COMMERCIAL

## 2021-05-21 PROCEDURE — 97140 MANUAL THERAPY 1/> REGIONS: CPT | Mod: GP

## 2021-05-24 DIAGNOSIS — M76.899 HAMSTRING TENDINITIS: Primary | ICD-10-CM

## 2021-05-24 NOTE — PROGRESS NOTES
PT sent word that not making any progress. She recommended seeing Sports Med.  Several names from [facility] were recommended. Pt agrees with plan.

## 2021-08-11 ENCOUNTER — TRANSFERRED RECORDS (OUTPATIENT)
Dept: HEALTH INFORMATION MANAGEMENT | Facility: CLINIC | Age: 51
End: 2021-08-11

## 2021-08-12 ENCOUNTER — TELEPHONE (OUTPATIENT)
Dept: FAMILY MEDICINE | Facility: OTHER | Age: 51
End: 2021-08-12

## 2021-08-12 ENCOUNTER — MYC MEDICAL ADVICE (OUTPATIENT)
Dept: FAMILY MEDICINE | Facility: OTHER | Age: 51
End: 2021-08-12

## 2021-08-12 DIAGNOSIS — S76.312D RUPTURE OF LEFT PROXIMAL HAMSTRING TENDON, SUBSEQUENT ENCOUNTER: ICD-10-CM

## 2021-08-12 DIAGNOSIS — S76.012A MUSCLE STRAIN OF LEFT GLUTEAL REGION, INITIAL ENCOUNTER: Primary | ICD-10-CM

## 2021-08-12 NOTE — TELEPHONE ENCOUNTER
Next 5 appointments (look out 90 days)      Aug 13, 2021  9:00 AM  (Arrive by 8:45 AM)  SHORT with Polina Salgado CNP  [facility] ([facility] ) [address]  [facility] [address]  [phone]        Patient Advised that there were no available appointments at any of our locations today, pt scheduled tomorrow in [facility] with Polina Salgado CNP.  Patient stated that if the pain got worse that she would got to Urgent Care / ER and she would call us back to cancel her appointment tomorrow.

## 2021-08-12 NOTE — TELEPHONE ENCOUNTER
Reason for call:  OVERBOOK    Patient is having the following symptoms left ear pain for 1 week    The patient is requesting an appointment for today with any available provider    Was an appointment offered for this call?  no     Preferred method for responding to this message: telephone    If we cannot reach you directly, may we leave a detailed response at the number you provided? yes    Can this message wait until your PCP/provider returns, if not available today? yes

## 2021-08-13 ENCOUNTER — HOSPITAL ENCOUNTER (EMERGENCY)
Facility: HOSPITAL | Age: 51
Discharge: HOME | End: 2021-08-13
Attending: NURSE PRACTITIONER | Admitting: NURSE PRACTITIONER
Payer: COMMERCIAL

## 2021-08-13 VITALS
RESPIRATION RATE: 18 BRPM | DIASTOLIC BLOOD PRESSURE: 99 MMHG | WEIGHT: 115 LBS | OXYGEN SATURATION: 98 % | BODY MASS INDEX: 19.74 KG/M2 | TEMPERATURE: 98 F | SYSTOLIC BLOOD PRESSURE: 133 MMHG | HEART RATE: 90 BPM

## 2021-08-13 DIAGNOSIS — H92.02 LEFT EAR PAIN: Primary | ICD-10-CM

## 2021-08-13 PROCEDURE — G0463 HOSPITAL OUTPT CLINIC VISIT: HCPCS

## 2021-08-13 PROCEDURE — 99213 OFFICE O/P EST LOW 20 MIN: CPT | Performed by: NURSE PRACTITIONER

## 2021-08-13 ASSESSMENT — ENCOUNTER SYMPTOMS
COUGH: 0
NAUSEA: 0
FEVER: 0
PALPITATIONS: 0
HEADACHES: 0
FATIGUE: 1
DIZZINESS: 0
EYE ITCHING: 0
TROUBLE SWALLOWING: 0
EYE PAIN: 0
SINUS PRESSURE: 0
SINUS PAIN: 0
DIARRHEA: 0
PSYCHIATRIC NEGATIVE: 1
SORE THROAT: 0
VOMITING: 0
SHORTNESS OF BREATH: 0
EYE REDNESS: 0
RHINORRHEA: 0
CHILLS: 0

## 2021-08-13 NOTE — ED PROVIDER NOTES
History     Chief Complaint   Patient presents with     Otalgia     HPI  Kacy Valdez is a 51 year old female who presents to urgent care today with complaints of left ear pain which started approximately 10 days ago.  No known cause.  No tinnitus.  Denies any dizziness or vertigo.  No hearing loss.  No history of otitis media.  Has not recently been swimming.  No recent illness.  Denies any fever, chills, nausea, vomiting, diarrhea, SOB or chest pain.  History of seasonal allergies and takes Flonase.  Patient grinds teeth and wears a mouth guard nightly.  Patient unsure if she clenches teeth during the day.  No other concerns.      Allergies:  Allergies   Allergen Reactions     Nuts Other (See Comments)     Nut flavor - throat tightens       Problem List:    Patient Active Problem List    Diagnosis Date Noted     Perimenopausal 01/08/2020     Priority: Medium     Chondromalacia of patella, left 12/18/2018     Priority: Medium     Chondromalacia of patella, right 12/18/2018     Priority: Medium     Subluxation of left patella, initial encounter 12/18/2018     Priority: Medium     Subluxation of right patella, initial encounter 12/18/2018     Priority: Medium     Stenosis, cervix 08/23/2017     Priority: Medium     Requires dilation with insertion of IUD       Nut allergy 07/14/2017     Priority: Medium     Breast cancer screening, high risk patient 09/15/2015     Priority: Medium     High risk HPV infection 06/12/2015     Priority: Medium     Not 16 or 18       Family history of breast cancer in sister 05/21/2014     Priority: Medium     MRI due every other year 2017 next       Depression with anxiety 05/21/2014     Priority: Medium     Annual physical exam 05/08/2013     Priority: Medium     History of abnormal Pap smear 05/08/2013     Priority: Medium     HGSIL LEEP       Family planning 05/08/2013     Priority: Medium     Mirena 5/1/12       Dysthymia 05/08/2013     Priority: Medium     Carcinoma in situ of  cervix uteri 05/19/2009     Priority: Medium        Past Medical History:    Past Medical History:   Diagnosis Date     Carcinoma in situ of cervix uteri 05/19/2009     Dysthymic disorder 8/8/2001     Other acne 1/10/2002     Panic disorder without agoraphobia 1/3/2011     Papanicolaou smear of cervix with high grade squamous intraepithelial lesion (HGSIL) 4/27/2009     Unspecified sinusitis (chronic) 6/5/2003       Past Surgical History:    Past Surgical History:   Procedure Laterality Date     ------------OTHER-------------  2009    LEEP     ------------OTHER-------------  2005    IUD     BIOPSY BREAST      breast lump     BUNIONECTOMY  2010     COLONOSCOPY N/A 10/4/2018    Repeat in 2028//Procedure: COLONOSCOPY;  COLONSCOPY with Polypectomy;  Surgeon: Jese Rizzo MD;  Location: [facility]     IR CONSULTATION FOR IR EXAM  1/10/2020     IR TRIGGER POINT INJ 1 OR 2 MUSCLES  1/29/2020     IR TRIGGER POINT INJ 1 OR 2 MUSCLES  9/21/2020     IR TRIGGER POINT INJ 1 OR 2 MUSCLES  2/24/2021       Family History:    Family History   Problem Relation Age of Onset     Cancer Sister 48        breast     Breast Cancer Paternal Grandmother 71       Social History:  Marital Status:   [2]  Social History     Tobacco Use     Smoking status: Never Smoker     Smokeless tobacco: Never Used   Substance Use Topics     Alcohol use: No     Drug use: No        Medications:    celecoxib (CELEBREX) 200 MG capsule  EPINEPHrine (EPIPEN/ADRENACLICK/OR ANY BX GENERIC EQUIV) 0.3 MG/0.3ML injection 2-pack  levonorgestrel (MIRENA) 20 MCG/24HR IUD  nystatin-triamcinolone (MYCOLOG II) 357772-4.1 UNIT/GM-% external cream  PARoxetine (PAXIL) 10 MG tablet      Review of Systems   Constitutional: Positive for fatigue. Negative for chills and fever.   HENT: Positive for ear pain (left ear). Negative for congestion, ear discharge, rhinorrhea, sinus pressure, sinus pain, sore throat and trouble swallowing.    Eyes: Negative for pain, redness and  "itching.   Respiratory: Negative for cough and shortness of breath.    Cardiovascular: Negative for chest pain and palpitations.   Gastrointestinal: Negative for diarrhea, nausea and vomiting.   Neurological: Negative for dizziness and headaches.   Psychiatric/Behavioral: Negative.      Physical Exam   BP: 133/99  Pulse: 90  Temp: 98  F (36.7  C) (\"100.4 temp at home this morning\")  Resp: 18  Weight: 52.2 kg (115 lb)  SpO2: 98 %    Physical Exam  Vitals and nursing note reviewed.   Constitutional:       General: She is not in acute distress.     Appearance: She is not ill-appearing.   HENT:      Head: Normocephalic.      Right Ear: Tympanic membrane, ear canal and external ear normal.      Left Ear: Tympanic membrane, ear canal and external ear normal.      Nose: Nose normal.      Mouth/Throat:      Mouth: Mucous membranes are moist.      Pharynx: Oropharynx is clear. No posterior oropharyngeal erythema.   Eyes:      Extraocular Movements: Extraocular movements intact.      Conjunctiva/sclera: Conjunctivae normal.      Pupils: Pupils are equal, round, and reactive to light.   Cardiovascular:      Rate and Rhythm: Normal rate and regular rhythm.      Pulses: Normal pulses.      Heart sounds: Normal heart sounds.   Pulmonary:      Effort: Pulmonary effort is normal.      Breath sounds: Normal breath sounds.   Musculoskeletal:      Cervical back: Normal range of motion and neck supple.   Neurological:      Mental Status: She is alert.   Psychiatric:         Mood and Affect: Mood normal.       ED Course     No results found for this or any previous visit (from the past 24 hour(s)).    Medications - No data to display    Assessments & Plan (with Medical Decision Making)     I have reviewed the nursing notes.    I have reviewed the findings, diagnosis, plan and need for follow up with the patient.  (H92.02) Left ear pain  (primary encounter diagnosis)  Plan: Otolaryngology Referral  Patient has ongoing left ear pain for " the past 7-10 days which is sharp in nature.  Denies any history of acute otitis media.  Denies any tinnitus. Denies any hearing loss.  Patient states she has seasonal allergies and takes Flonase.  Patient also grinds teeth at night and wears a mouthguard.  Patient unsure if she clenches teeth during the day.  No signs of any left ear infection at this time.  Patient to monitor clenching during the day.  Patient to monitor when pain worsens.  Patient to take over-the-counter Zyrtec or Claritin daily for the next 3 to 5 days to see if ear pain improves.  If pain continues without any improvement patient to follow-up with ENT as needed.  Patient in agreement with treatment plan.        Discharge Medication List as of 8/13/2021  4:45 PM        Final diagnoses:   Left ear pain     8/13/2021   [facility]     Faviola Huggins NP  08/13/21 [id number]

## 2021-08-13 NOTE — DISCHARGE INSTRUCTIONS
If pain does not improve follow up with ENT for further evaluation.     Return to urgent care/ED with any worsening in condition or additional concerns.

## 2021-08-16 ENCOUNTER — TELEPHONE (OUTPATIENT)
Dept: OTOLARYNGOLOGY | Facility: OTHER | Age: 51
End: 2021-08-16

## 2021-08-16 NOTE — TELEPHONE ENCOUNTER
8/16/21    Referral received, left message to schedule patient with ENT Dept.    -Adia Alvarez  Specialty [facility]  Ext. [phone]

## 2021-08-31 ENCOUNTER — HOSPITAL ENCOUNTER (OUTPATIENT)
Dept: MRI IMAGING | Facility: HOSPITAL | Age: 51
End: 2021-08-31
Attending: FAMILY MEDICINE
Payer: COMMERCIAL

## 2021-08-31 DIAGNOSIS — S76.312D RUPTURE OF LEFT PROXIMAL HAMSTRING TENDON, SUBSEQUENT ENCOUNTER: ICD-10-CM

## 2021-08-31 DIAGNOSIS — S76.012A MUSCLE STRAIN OF LEFT GLUTEAL REGION, INITIAL ENCOUNTER: ICD-10-CM

## 2021-08-31 PROCEDURE — 72195 MRI PELVIS W/O DYE: CPT

## 2021-09-05 ENCOUNTER — HEALTH MAINTENANCE LETTER (OUTPATIENT)
Age: 51
End: 2021-09-05

## 2021-09-07 ENCOUNTER — TELEPHONE (OUTPATIENT)
Dept: FAMILY MEDICINE | Facility: OTHER | Age: 51
End: 2021-09-07

## 2021-09-24 NOTE — PROGRESS NOTES
Outpatient Physical Therapy Discharge Note     Patient: Kacy Valdez  : 1970    Beginning/End Dates of Reporting Period:  3/5/21 to 21    Referring Physician: Dr. Hart   Orders: Eval and Treat  Medical Diagnosis: Hamstring tendinitis [M76.899]      Client Self Report: Symptoms of pain are back. Thinks it is more related to injection wearing off versus exercise or activity driven. Shoulder motion is improved.     Assessment: Unable to improve symptoms with conservative management. Discussed with Dr. Hart and recommended consult with sports med ortho specialist.     Goals:  LTG 1: Patient will be able to perform prolonged sitting, walking, stairs without limitation of L proximal HS/ischial pain.   LTG 2: Patient will be able to consistently sleep through the night without pain from L proximal HS/ischial pain or R shoulder pain.   LTG 3: Patient will be able to return to running activities and yoga practice without limitation from L proximal HS/ischial pain.   LTG 4: Patient will be able to perform overhead R shoulder motions and pain free lifting, reaching and carrying.   LTG 5: Patient will be able to independent progress HEP for continued long term management of symptoms.   Goals Not Met     Plan:  Discharge from therapy.    Discharge:    Reason for Discharge: No further expectation of progress.    Equipment Issued: HEP    Discharge Plan: Other services: ortho sports med consult.

## 2021-11-02 ENCOUNTER — MYC MEDICAL ADVICE (OUTPATIENT)
Dept: FAMILY MEDICINE | Facility: OTHER | Age: 51
End: 2021-11-02

## 2021-11-02 DIAGNOSIS — S76.012A MUSCLE STRAIN OF LEFT GLUTEAL REGION, INITIAL ENCOUNTER: Primary | ICD-10-CM

## 2021-11-02 DIAGNOSIS — M76.899 HAMSTRING TENDINITIS: ICD-10-CM

## 2021-11-24 ENCOUNTER — HOSPITAL ENCOUNTER (OUTPATIENT)
Facility: HOSPITAL | Age: 51
Discharge: HOME | End: 2021-11-24
Attending: RADIOLOGY | Admitting: RADIOLOGY
Payer: COMMERCIAL

## 2021-11-24 ENCOUNTER — HOSPITAL ENCOUNTER (OUTPATIENT)
Dept: INTERVENTIONAL RADIOLOGY/VASCULAR | Facility: HOSPITAL | Age: 51
End: 2021-11-24
Attending: FAMILY MEDICINE | Admitting: RADIOLOGY
Payer: COMMERCIAL

## 2021-11-24 DIAGNOSIS — M76.899 HAMSTRING TENDINITIS: ICD-10-CM

## 2021-11-24 DIAGNOSIS — S76.012A MUSCLE STRAIN OF LEFT GLUTEAL REGION, INITIAL ENCOUNTER: ICD-10-CM

## 2021-11-24 PROCEDURE — 250N000009 HC RX 250: Performed by: RADIOLOGY

## 2021-11-24 PROCEDURE — 96372 THER/PROPH/DIAG INJ SC/IM: CPT | Performed by: RADIOLOGY

## 2021-11-24 PROCEDURE — 250N000011 HC RX IP 250 OP 636: Performed by: RADIOLOGY

## 2021-11-24 PROCEDURE — 20552 NJX 1/MLT TRIGGER POINT 1/2: CPT

## 2021-11-24 RX ORDER — METHYLPREDNISOLONE ACETATE 80 MG/ML
80 INJECTION, SUSPENSION INTRA-ARTICULAR; INTRALESIONAL; INTRAMUSCULAR; SOFT TISSUE ONCE
Status: COMPLETED | OUTPATIENT
Start: 2021-11-24 | End: 2021-11-24

## 2021-11-24 RX ORDER — LIDOCAINE HYDROCHLORIDE 10 MG/ML
10 INJECTION, SOLUTION EPIDURAL; INFILTRATION; INTRACAUDAL; PERINEURAL ONCE
Status: COMPLETED | OUTPATIENT
Start: 2021-11-24 | End: 2021-11-24

## 2021-11-24 RX ADMIN — LIDOCAINE HYDROCHLORIDE 7 ML: 10 INJECTION, SOLUTION EPIDURAL; INFILTRATION; INTRACAUDAL; PERINEURAL at 09:28

## 2021-11-24 RX ADMIN — METHYLPREDNISOLONE ACETATE 80 MG: 80 INJECTION, SUSPENSION INTRA-ARTICULAR; INTRALESIONAL; INTRAMUSCULAR; SOFT TISSUE at 09:29

## 2021-11-24 NOTE — DISCHARGE INSTRUCTIONS
Cell number on file:    Telephone Information:   Mobile [phone]     Is it ok to leave a message at this number(s)? Yes    Dr. Brandt completed your procedure on 11/24/2021.    Current Pain Level (0-10 Scale): 6/10  Post Pain Level (0-10):  2/10    Radiology Discharge instructions for Steroid Injection    Activity Level:     Do not do any heavy activity or exercise for 24 hours.   Do not drive for 4 hours after your injection.  Diet:   Return to your normal diet.  Medications:   If you have stopped taking your Aspirin, Coumadin/Warfarin, Ibuprofen, or any   other blood thinner for this procedure you may resume in the morning unless   your primary care provider has given you other instructions.    Diabetics may see an increase in blood sugar after steroid injections. If you are concerned about your blood sugar, please contact your family doctor.    Site Care:  Remove the bandage and bathe or shower the morning after the procedure.      Please allow two weeks to experience improvement in your pain.  If you have any further issues, please contact your provider.    Call your Primary Care Provider if you have the following (if your primary care provider is not available please seek emergency care):   Nausea with vomiting   Severe headache   Drowsiness or confusion   Redness or drainage at the injection or puncture site   Temperature over 101 degrees F   Other concerns   Worsening back pain   Stiff neck

## 2021-12-10 ENCOUNTER — TELEPHONE (OUTPATIENT)
Dept: INTERVENTIONAL RADIOLOGY/VASCULAR | Facility: HOSPITAL | Age: 51
End: 2021-12-10
Payer: COMMERCIAL

## 2021-12-10 NOTE — TELEPHONE ENCOUNTER
INJECTION POST CALL    Procedure:  trigger point  injection/therapeutic injection targeting the left ischial  tuberosity/common hamstrings tendon  Radiologist(s): Dr. Rei Brandt  Date of Procedure:  11/24/21    Relief of pain from this injection    A = 90%  A- = 85%  B = 80%  B- =75%  C = 70%  C- = 65%  D = 60%  D- = 50%  F = less than 50%    Do you feel this injection was beneficial? Yes  If yes, how long did it last? Currently relieving 75% of pain, not completely gone but better    Where is the pain located?  left  gluteal region  Can you describe the pain? Slight ache/strain    Does the pain radiate anywhere? no  If yes, where does the pain stop?n/a    Is this new pain? No      The patient had no questions or concerns.    Instruct patient to follow up with their provider for any further care they may need. (as Dr. Brandt will no longer be making recommendations)    Erica Brandon

## 2022-01-24 ENCOUNTER — OFFICE VISIT (OUTPATIENT)
Dept: FAMILY MEDICINE | Facility: OTHER | Age: 52
End: 2022-01-24
Attending: NURSE PRACTITIONER
Payer: COMMERCIAL

## 2022-01-24 VITALS
BODY MASS INDEX: 19.63 KG/M2 | DIASTOLIC BLOOD PRESSURE: 76 MMHG | HEART RATE: 67 BPM | OXYGEN SATURATION: 99 % | RESPIRATION RATE: 20 BRPM | WEIGHT: 115 LBS | SYSTOLIC BLOOD PRESSURE: 128 MMHG | TEMPERATURE: 97.7 F | HEIGHT: 64 IN

## 2022-01-24 DIAGNOSIS — M54.50 ACUTE BILATERAL LOW BACK PAIN WITHOUT SCIATICA: Primary | ICD-10-CM

## 2022-01-24 PROCEDURE — 99213 OFFICE O/P EST LOW 20 MIN: CPT | Performed by: NURSE PRACTITIONER

## 2022-01-24 RX ORDER — IBUPROFEN 800 MG/1
800 TABLET, FILM COATED ORAL EVERY 8 HOURS PRN
Qty: 90 TABLET | Refills: 0 | Status: SHIPPED | OUTPATIENT
Start: 2022-01-24 | End: 2022-02-23

## 2022-01-24 RX ORDER — CYCLOBENZAPRINE HCL 5 MG
5 TABLET ORAL 3 TIMES DAILY PRN
Qty: 60 TABLET | Refills: 0 | Status: SHIPPED | OUTPATIENT
Start: 2022-01-24 | End: 2022-09-01

## 2022-01-24 ASSESSMENT — PAIN SCALES - GENERAL: PAINLEVEL: EXTREME PAIN (9)

## 2022-01-24 ASSESSMENT — MIFFLIN-ST. JEOR: SCORE: 1111.64

## 2022-01-24 NOTE — PATIENT INSTRUCTIONS
Patient Education   When You Have Low Back Pain  Caring for Your Back  You are not alone.  Low back pain is very common. Nearly half of all adults have low back pain in any given year.  The good news is that back pain is rarely a danger to your health. Most people can manage their back pain on their own and about half of them start feeling better within 2 weeks. In 9 out of 10 cases, low back pain goes away or no longer limits daily activity within 6 weeks.  Your outlook is good!  Your symptoms tell us that your low back pain is most likely not a danger to you. Most of the time we do not know the exact cause of low back pain, even if you see a doctor or have an MRI. However, treatment can still work without knowing the cause of the pain. Less than 1 in 100 people need surgery for their back pain.  What can I do about my low back pain?  There are three things you can do to ease low back pain and help it go away.    Use heat or cold packs.    Take medicine as directed.    Use positions, movements and exercises.  Using heat or cold packs  Try cold packs or gentle heat to ease your pain. Use whichever gives you the most relief. Apply the cold pack or heat for 15 minutes at a time, as often as needed.  Taking medicine    If your doctor has prescribed medicine, be sure to follow the directions.    If you take over-the-counter medicine, read and follow the directions.    Talk to your doctor if you have any questions.  Using positions, movements and exercises  Research tells us that moving your joints and muscles can help you recover from back pain. Such activity should be simple and gentle.  Use the positions in the photos as well as walking to help relieve your pain. Try taking a short walk every 3 to 4 hours during the day. Walk for a few minutes inside your home or take longer walks outside, on a treadmill or at a mall. Slowly increase the amount of time you walk.  Expect discomfort when you begin, but it should  lessen as your back starts to heal. When your back feels better, walk daily to keep your back and body healthy.  Finding a comfortable position  When your back pain is new, certain positions will ease your pain. Gently try each of the positions below until you find one that is helpful. Once you find a position of comfort, use it as often as you like when you are resting. You will recover faster if you combine rest with activity.         When should I call my doctor?  Your back pain should improve over the first couple of weeks. As it improves, you should be able to return to your normal activities. But call your doctor if:    You have a sudden change in your ability to control?your bladder or bowels.    You feel tingling in your groin or legs.    The pain spreads down your leg and into your foot.    Your toes, feet or leg muscles feel weak.    You feel generally unwell or sick.    Your pain does not get better or gets worse.    For informational purposes only. Not to replace the advice of your health care provider.  Copyright   2013 [employer]. All rights reserved. Jelli [id number] - REV 03/16.       Patient Education     Back Care Tips     Caring for your back  These are things you can do to prevent a recurrence of acute back pain and to reduce symptoms from chronic back pain:    Stay at a healthy weight. If you are overweight, losing weight will help most types of back pain.    Exercise is an important part of recovery from most types of back pain. The muscles behind and in front of the spine support the back. This means strengthening both the back muscles and the abdominal muscles will provide better support for your spine.     Swimming and brisk walking are good overall exercises to improve your fitness level.    Practice safe lifting methods (see below).    Practice good posture when sitting, standing, and walking. Don't sit for a long time. This puts more stress on the lower back than standing  or walking.    Wear quality shoes with good arch support. Foot and ankle alignment can affect back symptoms. Don't wear high heels.    Therapeutic massage can help relax the back muscles without stretching them.    During the first 24 to 72 hours after an acute injury or flare-up of chronic back pain, put an ice pack on the painful area for 20 minutes and then remove it for 20 minutes. Do thisover a period of 60 to 90 minutes, or several times a day. As a safety precaution, don't use a heating pad at bedtime. Sleeping on a heating pad can lead to skin burns or tissue damage.    You can alternate using ice and heat.  Medicines  Talk with your healthcare provider before using medicines, especially if you have other health problems or are taking other medicines.    You may use over-the-counter medicines, such as acetaminophen, ibuprofen, or naprosyn to control pain, unless your healthcare provider prescribed other pain medicine. Talk with your healthcare provider before taking any medicines if you have a chronic condition such as diabetes, liver or kidney disease, stomach ulcers, or digestive bleeding, or are taking blood thinners.    Be careful if you are given prescription pain medicines, opioids, or medicine for muscle spasm. They can cause drowsiness, and affect your coordination, reflexes, and judgment. Don't drive or operate heavy machinery while taking these types of medicines. Take prescription pain medicine only as prescribed by your healthcare provider.  Lumbar stretch  This simple stretch will help relax muscle spasm and keep your back more limber. If exercise makes your back pain worse, don t do it.    Lie on your back with your knees bent and both feet on the ground.    Slowly raise your left knee to your chest as you flatten your lower back against the floor. Hold for 5 seconds.    Relax and repeat the exercise with your right knee.    Do 10 of these exercises for each leg.  Safe lifting method    Don t  bend over at the waist to lift an object off the floor.  Instead, bend your knees and hips in a squat.     Keep your back and head upright    Hold the object close to your body, directly in front of you.    Straighten your legs to lift the object.     Lower the object to the floor in the reverse fashion.    If you must slide something across the floor, push it.    Posture tips  Sitting  Sit in chairs with straight backs or low-back support. Keep your knees lower than your hips, with your feet flat on the floor.  When driving, sit up straight. Adjust the seat forward so you are not leaning toward the steering wheel.  A small pillow or rolled towel behind your lower back may help if you are driving long distances.   Standing  When standing for long periods, shift most of your weight to one leg at a time. Switch legs every few minutes.   Sleeping  The best way to sleep is on your side with your knees bent. Put a low pillow under your head to support your neck in a neutral spine position. Don't use thick pillows that bend your neck to one side. Put a pillow between your legs to further relax your lower back. If you sleep on your back, put pillows under your knees to support your legs in a slightly flexed position. Use a firm mattress. If your mattress sags, replace it, or use a 1/2-inch plywood board under the mattress to add support.  Follow-up care  Follow up with your healthcare provider, or as advised.  If X-rays, a CT scan or an MRI scan were taken, they may be reviewed by a radiologist. You will be told of any new findings that may affect your care.  Call 911  Call 911 if any of the following occur:    Trouble breathing    Confusion    Very drowsy    Fainting or loss of consciousness    Rapid or very slow heart rate    Loss of  bowel or bladder control  When to seek medical advice  Call your healthcare provider right away if any of the following occur:    Pain becomes worse or spreads to your arms or  legs    Weakness or numbness in one or both arms or legs    Numbness in the groin area  [employer] last reviewed this educational content on 11/1/2019 2000-2021 [employer]. All rights reserved. This information is not intended as a substitute for professional medical care. Always follow your healthcare professional's instructions.

## 2022-01-24 NOTE — PROGRESS NOTES
Assessment & Plan     1. Acute bilateral low back pain without sciatica  Discussed that there is not a need for imaging today. We reviewed typical course of acute back pain. Offered physical therapy to start after 1-2 weeks. This is declined at this time but if she would like it in the near future, she knows to reach out to the clinic.   - cyclobenzaprine (FLEXERIL) 5 MG tablet; Take 1 tablet (5 mg) by mouth 3 times daily as needed for muscle spasms  Dispense: 60 tablet; Refill: 0  - ibuprofen (ADVIL/MOTRIN) 800 MG tablet; Take 1 tablet (800 mg) by mouth every 8 hours as needed for moderate pain  Dispense: 90 tablet; Refill: 0    Follow up with primary provider, Dr. Hart as needed and for any routine visit needs.     Prescription drug management       Return if symptoms worsen or fail to improve.    Polina Salgado, [facility] - ELI Woodruff is a 51 year old who presents for the following health issues     HPI     Pain History:  When did you first notice your pain? - Less than 1 week   Have you seen anyone else for your pain? No  Where in your body do you have pain? Back Pain  Onset/Duration: 6 Days  Description:   Location of pain: low back across   Character of pain: dull ache  Pain radiation: none  New numbness or weakness in legs, not attributed to pain: no   Intensity: Currently 9/10, At its worst 9/10  Progression of Symptoms: worsening  History:   Specific cause: none  Pain interferes with job: YES  History of back problems: no prior back problems  Any previous MRI or X-rays: None  Sees a specialist for back pain: No  Alleviating factors:   Improved by: acetaminophen (Tylenol), heat and NSAIDs    Precipitating factors:  Worsened by: Bending, Sitting and sneeze  Therapies tried and outcome: acetaminophen (Tylenol), heat and NSAIDs provide some relief    Accompanying Signs & Symptoms:  Risk of Fracture: None  Risk of Cauda Equina: None  Risk of Infection: None  Risk of  "Cancer: None  Risk of Ankylosing Spondylitis: Onset at age <35, male, AND morning back stiffness  no     Denies urinary symptoms.    Review of Systems   Constitutional, HEENT, cardiovascular, pulmonary, gi and gu systems are negative, except as otherwise noted.      Objective    /76 (BP Location: Right arm, Patient Position: Sitting, Cuff Size: Adult Regular)   Pulse 67   Temp 97.7  F (36.5  C) (Tympanic)   Resp 20   Ht 1.626 m (5' 4\")   Wt 52.2 kg (115 lb)   SpO2 99%   BMI 19.74 kg/m    Body mass index is 19.74 kg/m .  Physical Exam   GENERAL: healthy, alert and no distress  RESP: lungs clear to auscultation - no rales, rhonchi or wheezes  CV: regular rate and rhythm, normal S1 S2, no S3 or S4, no murmur, click or rub, no peripheral edema and peripheral pulses strong  BACK: No CVA tenderness. Para lumbar tenderness bilaterally with spasms present.     No results found for any visits on 01/24/22.          "

## 2022-01-24 NOTE — NURSING NOTE
"Chief Complaint   Patient presents with     Back Pain       Initial /76 (BP Location: Right arm, Patient Position: Sitting, Cuff Size: Adult Regular)   Pulse 67   Temp 97.7  F (36.5  C) (Tympanic)   Resp 20   Ht 1.626 m (5' 4\")   Wt 52.2 kg (115 lb)   SpO2 99%   BMI 19.74 kg/m   Estimated body mass index is 19.74 kg/m  as calculated from the following:    Height as of this encounter: 1.626 m (5' 4\").    Weight as of this encounter: 52.2 kg (115 lb).  Medication Reconciliation: complete  [place] Ag LPN    "

## 2022-01-26 DIAGNOSIS — F41.0 PANIC DISORDER WITHOUT AGORAPHOBIA: ICD-10-CM

## 2022-01-26 RX ORDER — PAROXETINE 10 MG/1
TABLET, FILM COATED ORAL
Qty: 90 TABLET | Refills: 0 | Status: SHIPPED | OUTPATIENT
Start: 2022-01-26 | End: 2022-04-27

## 2022-03-07 DIAGNOSIS — M76.899 HAMSTRING TENDINITIS: Primary | ICD-10-CM

## 2022-04-12 DIAGNOSIS — S76.312D RUPTURE OF LEFT PROXIMAL HAMSTRING TENDON, SUBSEQUENT ENCOUNTER: ICD-10-CM

## 2022-04-12 DIAGNOSIS — M76.899 HAMSTRING TENDINITIS: Primary | ICD-10-CM

## 2022-04-17 ENCOUNTER — HEALTH MAINTENANCE LETTER (OUTPATIENT)
Age: 52
End: 2022-04-17

## 2022-04-21 ENCOUNTER — ANCILLARY PROCEDURE (OUTPATIENT)
Dept: MAMMOGRAPHY | Facility: OTHER | Age: 52
End: 2022-04-21
Attending: FAMILY MEDICINE
Payer: COMMERCIAL

## 2022-04-21 DIAGNOSIS — Z12.31 VISIT FOR SCREENING MAMMOGRAM: ICD-10-CM

## 2022-04-21 PROCEDURE — 77067 SCR MAMMO BI INCL CAD: CPT | Mod: TC | Performed by: RADIOLOGY

## 2022-04-21 PROCEDURE — 77063 BREAST TOMOSYNTHESIS BI: CPT | Mod: TC | Performed by: RADIOLOGY

## 2022-04-25 DIAGNOSIS — F41.0 PANIC DISORDER WITHOUT AGORAPHOBIA: ICD-10-CM

## 2022-04-27 RX ORDER — PAROXETINE 10 MG/1
TABLET, FILM COATED ORAL
Qty: 90 TABLET | Refills: 3 | Status: SHIPPED | OUTPATIENT
Start: 2022-04-27 | End: 2023-05-01

## 2022-04-27 NOTE — TELEPHONE ENCOUNTER
Paxil       Last Written Prescription Date:  1-26-22  Last Fill Quantity: 90,   # refills: 0  Last Office Visit: 1-24-22  Future Office visit:    Next 5 appointments (look out 90 days)    May 25, 2022  7:45 AM  (Arrive by 7:30 AM)  PHYSICAL with Carlos Hart MD  [facility] [place] ([facility] [place] ) [address]  [place] [address]  [phone]

## 2022-05-10 ENCOUNTER — TRANSFERRED RECORDS (OUTPATIENT)
Dept: HEALTH INFORMATION MANAGEMENT | Facility: CLINIC | Age: 52
End: 2022-05-10
Payer: COMMERCIAL

## 2022-05-25 ENCOUNTER — LAB (OUTPATIENT)
Dept: LAB | Facility: OTHER | Age: 52
End: 2022-05-25
Payer: COMMERCIAL

## 2022-05-25 ENCOUNTER — TELEPHONE (OUTPATIENT)
Dept: FAMILY MEDICINE | Facility: OTHER | Age: 52
End: 2022-05-25

## 2022-05-25 ENCOUNTER — OFFICE VISIT (OUTPATIENT)
Dept: FAMILY MEDICINE | Facility: OTHER | Age: 52
End: 2022-05-25
Attending: FAMILY MEDICINE
Payer: COMMERCIAL

## 2022-05-25 VITALS
OXYGEN SATURATION: 100 % | TEMPERATURE: 97.9 F | RESPIRATION RATE: 16 BRPM | HEART RATE: 73 BPM | SYSTOLIC BLOOD PRESSURE: 122 MMHG | WEIGHT: 118 LBS | DIASTOLIC BLOOD PRESSURE: 77 MMHG | BODY MASS INDEX: 20.25 KG/M2

## 2022-05-25 DIAGNOSIS — M76.899 HAMSTRING TENDINITIS: ICD-10-CM

## 2022-05-25 DIAGNOSIS — B97.7 HIGH RISK HPV INFECTION: ICD-10-CM

## 2022-05-25 DIAGNOSIS — Z01.419 WELL WOMAN EXAM: ICD-10-CM

## 2022-05-25 DIAGNOSIS — D06.0 CARCINOMA IN SITU OF ENDOCERVIX: ICD-10-CM

## 2022-05-25 DIAGNOSIS — Z80.3 FAMILY HISTORY OF BREAST CANCER IN SISTER: ICD-10-CM

## 2022-05-25 DIAGNOSIS — N95.1 MENOPAUSAL FLUSHING: Primary | ICD-10-CM

## 2022-05-25 DIAGNOSIS — K14.9 TONGUE IRRITATION: ICD-10-CM

## 2022-05-25 DIAGNOSIS — Z12.39 BREAST CANCER SCREENING, HIGH RISK PATIENT: ICD-10-CM

## 2022-05-25 DIAGNOSIS — L98.9 SKIN LESION: ICD-10-CM

## 2022-05-25 LAB
ANION GAP SERPL CALCULATED.3IONS-SCNC: 3 MMOL/L (ref 3–14)
BASOPHILS # BLD AUTO: 0 10E3/UL (ref 0–0.2)
BASOPHILS NFR BLD AUTO: 1 %
BUN SERPL-MCNC: 16 MG/DL (ref 7–30)
CALCIUM SERPL-MCNC: 9.1 MG/DL (ref 8.5–10.1)
CHLORIDE BLD-SCNC: 110 MMOL/L (ref 94–109)
CO2 SERPL-SCNC: 27 MMOL/L (ref 20–32)
CREAT SERPL-MCNC: 0.86 MG/DL (ref 0.52–1.04)
EOSINOPHIL # BLD AUTO: 0.1 10E3/UL (ref 0–0.7)
EOSINOPHIL NFR BLD AUTO: 2 %
ERYTHROCYTE [DISTWIDTH] IN BLOOD BY AUTOMATED COUNT: 12.2 % (ref 10–15)
FSH SERPL-ACNC: 92.4 IU/L
GFR SERPL CREATININE-BSD FRML MDRD: 80 ML/MIN/1.73M2
GLUCOSE BLD-MCNC: 147 MG/DL (ref 70–99)
HCT VFR BLD AUTO: 44.3 % (ref 35–47)
HGB BLD-MCNC: 14.8 G/DL (ref 11.7–15.7)
IMM GRANULOCYTES # BLD: 0 10E3/UL
IMM GRANULOCYTES NFR BLD: 0 %
KOH PREPARATION: NORMAL
KOH PREPARATION: NORMAL
LYMPHOCYTES # BLD AUTO: 1.6 10E3/UL (ref 0.8–5.3)
LYMPHOCYTES NFR BLD AUTO: 31 %
MCH RBC QN AUTO: 31.1 PG (ref 26.5–33)
MCHC RBC AUTO-ENTMCNC: 33.4 G/DL (ref 31.5–36.5)
MCV RBC AUTO: 93 FL (ref 78–100)
MONOCYTES # BLD AUTO: 0.5 10E3/UL (ref 0–1.3)
MONOCYTES NFR BLD AUTO: 9 %
NEUTROPHILS # BLD AUTO: 3 10E3/UL (ref 1.6–8.3)
NEUTROPHILS NFR BLD AUTO: 57 %
NRBC # BLD AUTO: 0 10E3/UL
NRBC BLD AUTO-RTO: 0 /100
PLATELET # BLD AUTO: 276 10E3/UL (ref 150–450)
POTASSIUM BLD-SCNC: 3.9 MMOL/L (ref 3.4–5.3)
RBC # BLD AUTO: 4.76 10E6/UL (ref 3.8–5.2)
SODIUM SERPL-SCNC: 140 MMOL/L (ref 133–144)
WBC # BLD AUTO: 5.1 10E3/UL (ref 4–11)

## 2022-05-25 PROCEDURE — 83001 ASSAY OF GONADOTROPIN (FSH): CPT | Performed by: FAMILY MEDICINE

## 2022-05-25 PROCEDURE — 85025 COMPLETE CBC W/AUTO DIFF WBC: CPT

## 2022-05-25 PROCEDURE — 87624 HPV HI-RISK TYP POOLED RSLT: CPT | Performed by: FAMILY MEDICINE

## 2022-05-25 PROCEDURE — 36415 COLL VENOUS BLD VENIPUNCTURE: CPT

## 2022-05-25 PROCEDURE — 99396 PREV VISIT EST AGE 40-64: CPT | Performed by: FAMILY MEDICINE

## 2022-05-25 PROCEDURE — 87210 SMEAR WET MOUNT SALINE/INK: CPT | Performed by: FAMILY MEDICINE

## 2022-05-25 PROCEDURE — G0145 SCR C/V CYTO,THINLAYER,RESCR: HCPCS | Performed by: FAMILY MEDICINE

## 2022-05-25 PROCEDURE — 80048 BASIC METABOLIC PNL TOTAL CA: CPT

## 2022-05-25 RX ORDER — IBUPROFEN 600 MG/1
TABLET, FILM COATED ORAL
COMMUNITY
Start: 2022-04-26

## 2022-05-25 ASSESSMENT — PATIENT HEALTH QUESTIONNAIRE - PHQ9: SUM OF ALL RESPONSES TO PHQ QUESTIONS 1-9: 0

## 2022-05-25 ASSESSMENT — ENCOUNTER SYMPTOMS
HEMATOCHEZIA: 0
BREAST MASS: 0
MYALGIAS: 0
FREQUENCY: 0
PARESTHESIAS: 0
ARTHRALGIAS: 0
ABDOMINAL PAIN: 0
EYE PAIN: 0
HEARTBURN: 0
SORE THROAT: 0
WEAKNESS: 0
JOINT SWELLING: 0
NAUSEA: 0
NERVOUS/ANXIOUS: 0
HEADACHES: 0
DYSURIA: 0
PALPITATIONS: 0
FEVER: 0
SHORTNESS OF BREATH: 0
COUGH: 0
HEMATURIA: 0
DIARRHEA: 0
CONSTIPATION: 0
CHILLS: 0
DIZZINESS: 0

## 2022-05-25 ASSESSMENT — ANXIETY QUESTIONNAIRES
5. BEING SO RESTLESS THAT IT IS HARD TO SIT STILL: NOT AT ALL
GAD7 TOTAL SCORE: 0
3. WORRYING TOO MUCH ABOUT DIFFERENT THINGS: NOT AT ALL
6. BECOMING EASILY ANNOYED OR IRRITABLE: NOT AT ALL
1. FEELING NERVOUS, ANXIOUS, OR ON EDGE: NOT AT ALL
2. NOT BEING ABLE TO STOP OR CONTROL WORRYING: NOT AT ALL
GAD7 TOTAL SCORE: 0
4. TROUBLE RELAXING: NOT AT ALL
7. FEELING AFRAID AS IF SOMETHING AWFUL MIGHT HAPPEN: NOT AT ALL

## 2022-05-25 ASSESSMENT — PAIN SCALES - GENERAL: PAINLEVEL: NO PAIN (0)

## 2022-05-25 NOTE — PROGRESS NOTES
SUBJECTIVE:   CC: Kacy Valdez is an 52 year old woman who presents for preventive health visit.       Patient has been advised of split billing requirements and indicates understanding: Yes  Healthy Habits:     Getting at least 3 servings of Calcium per day:  Yes    Bi-annual eye exam:  Yes    Dental care twice a year:  NO    Sleep apnea or symptoms of sleep apnea:  None    Diet:  Regular (no restrictions)    Frequency of exercise:  6-7 days/week    Duration of exercise:  45-60 minutes    Taking medications regularly:  Yes    Medication side effects:  None    PHQ-2 Total Score: 0    Additional concerns today:  Yes          Dry mouth      Duration: 1 week    Description (location/character/radiation): dryness and things taste different    Intensity:  mild    Accompanying signs and symptoms: none    History (similar episodes/previous evaluation): None    Precipitating or alleviating factors: None    Therapies tried and outcome: medicated mouth wash    On flonase/ claritin        Today's PHQ-2 Score:   PHQ-2 ( 1999 Pfizer) 5/25/2022   Q1: Little interest or pleasure in doing things 0   Q2: Feeling down, depressed or hopeless 0   PHQ-2 Score 0   PHQ-2 Total Score (12-17 Years)- Positive if 3 or more points; Administer PHQ-A if positive -   Q1: Little interest or pleasure in doing things Not at all   Q2: Feeling down, depressed or hopeless Not at all   PHQ-2 Score 0       Abuse: Current or Past (Physical, Sexual or Emotional) - NO  Do you feel safe in your environment? Yes    Have you ever done Advance Care Planning? (For example, a Health Directive, POLST, or a discussion with a medical provider or your loved ones about your wishes): No, advance care planning information given to patient to review.  Patient plans to discuss their wishes with loved ones or provider.      Social History     Tobacco Use     Smoking status: Never Smoker     Smokeless tobacco: Never Used   Substance Use Topics     Alcohol use: No     If  you drink alcohol do you typically have >3 drinks per day or >7 drinks per week? No    Alcohol Use 5/25/2022   Prescreen: >3 drinks/day or >7 drinks/week? No   Prescreen: >3 drinks/day or >7 drinks/week? -       Reviewed orders with patient.  Reviewed health maintenance and updated orders accordingly -   Labs reviewed in EPIC    Breast Cancer Screening:  Any new diagnosis of family breast, ovarian, or bowel cancer? No    FHS-7:   Breast CA Risk Assessment (FHS-7) 4/21/2022 5/25/2022   Did any of your first-degree relatives have breast or ovarian cancer? Yes Yes   Did any of your relatives have bilateral breast cancer? - No   Did any man in your family have breast cancer? - No   Did any woman in your family have breast and ovarian cancer? - No   Did any woman in your family have breast cancer before age 50 y? - Yes   Do you have 2 or more relatives with breast and/or ovarian cancer? - Yes   Do you have 2 or more relatives with breast and/or bowel cancer? - No       Mammogram Screening: Recommended annual mammography  Pertinent mammograms are reviewed under the imaging tab.    History of abnormal Pap smear: NO - age 30- 65 PAP every 3 years recommended  PAP / HPV Latest Ref Rng & Units 1/8/2020 9/14/2018 8/23/2017   PAP (Historical) - NIL NIL NIL   HPV16 NEG:Negative Negative Negative Negative   HPV18 NEG:Negative Negative Negative Negative   HRHPV NEG:Negative Negative Negative Negative     Reviewed and updated as needed this visit by clinical staff   Tobacco  Allergies  Meds   Med Hx  Surg Hx  Fam Hx  Soc Hx          Reviewed and updated as needed this visit by Provider                   Past Medical History:   Diagnosis Date     Carcinoma in situ of cervix uteri 05/19/2009     Dysthymic disorder 8/8/2001     Other acne 1/10/2002     Panic disorder without agoraphobia 1/3/2011     Papanicolaou smear of cervix with high grade squamous intraepithelial lesion (HGSIL) 4/27/2009     Unspecified sinusitis (chronic)  6/5/2003      Past Surgical History:   Procedure Laterality Date     ------------OTHER-------------  2009    LEEP     ------------OTHER-------------  2005    IUD     BIOPSY BREAST      breast lump     BUNIONECTOMY  2010     COLONOSCOPY N/A 10/4/2018    Repeat in 2028//Procedure: COLONOSCOPY;  COLONSCOPY with Polypectomy;  Surgeon: Jese Rizzo MD;  Location: [facility]     IR CONSULTATION FOR IR EXAM  1/10/2020     IR TRIGGER POINT INJ 1 OR 2 MUSCLES  1/29/2020     IR TRIGGER POINT INJ 1 OR 2 MUSCLES  9/21/2020     IR TRIGGER POINT INJ 1 OR 2 MUSCLES  2/24/2021     IR TRIGGER POINT INJ 1 OR 2 MUSCLES  11/24/2021       Review of Systems  CONSTITUTIONAL: NEGATIVE for fever, chills, change in weight  INTEGUMENTARU/SKIN: NEGATIVE for worrisome rashes, moles or lesions  EYES: NEGATIVE for vision changes or irritation  ENT: NEGATIVE for ear, mouth and throat problems  RESP: NEGATIVE for significant cough or SOB  BREAST: NEGATIVE for masses, tenderness or discharge-- FH of breast cancer   CV: NEGATIVE for chest pain, palpitations or peripheral edema  GI: NEGATIVE for nausea, abdominal pain, heartburn, or change in bowel habits  : NEGATIVE for unusual urinary or vaginal symptoms. Periods missing - IUD--- had hot flashes age 48ish // menarche 11  MUSCULOSKELETAL: NEGATIVE for significant arthralgias or myalgia  NEURO: NEGATIVE for weakness, dizziness or paresthesias  PSYCHIATRIC: NEGATIVE for changes in mood or affect     OBJECTIVE:   /77 (BP Location: Left arm, Patient Position: Sitting, Cuff Size: Adult Regular)   Pulse 73   Temp 97.9  F (36.6  C) (Tympanic)   Resp 16   Wt 53.5 kg (118 lb)   SpO2 100%   BMI 20.25 kg/m    Physical Exam  GENERAL: healthy, alert and no distress  EYES: Eyes grossly normal to inspection, PERRL and conjunctivae and sclerae normal  HENT: ear canals and TM's normal, nose and mouth without ulcers or lesions  NECK: no adenopathy, no asymmetry, masses, or scars and thyroid normal to  palpation  RESP: lungs clear to auscultation - no rales, rhonchi or wheezes  BREAST: normal without masses, tenderness or nipple discharge and no palpable axillary masses or adenopathy  CV: regular rate and rhythm, normal S1 S2, no S3 or S4, no murmur, click or rub, no peripheral edema and peripheral pulses strong  ABDOMEN: soft, nontender, no hepatosplenomegaly, no masses and bowel sounds normal   (female): normal female external genitalia, normal urethral meatus, vaginal mucosa pink, moist, well rugated, and normal cervix/adnexa/uterus without masses or discharge  MS: no gross musculoskeletal defects noted, no edema  SKIN: no suspicious lesions or rashes-- atypical lesions- 2 of them on left triceps   NEURO: Normal strength and tone, mentation intact and speech normal  PSYCH: mentation appears normal, affect normal/bright  LYMPH: no cervical, supraclavicular, axillary, or inguinal adenopathy    NonFasting - had juice   Results for orders placed or performed in visit on 05/25/22   Basic metabolic panel     Status: Abnormal   Result Value Ref Range    Sodium 140 133 - 144 mmol/L    Potassium 3.9 3.4 - 5.3 mmol/L    Chloride 110 (H) 94 - 109 mmol/L    Carbon Dioxide (CO2) 27 20 - 32 mmol/L    Anion Gap 3 3 - 14 mmol/L    Urea Nitrogen 16 7 - 30 mg/dL    Creatinine 0.86 0.52 - 1.04 mg/dL    Calcium 9.1 8.5 - 10.1 mg/dL    Glucose 147 (H) 70 - 99 mg/dL    GFR Estimate 80 >60 mL/min/1.73m2   CBC with platelets and differential     Status: None   Result Value Ref Range    WBC Count 5.1 4.0 - 11.0 10e3/uL    RBC Count 4.76 3.80 - 5.20 10e6/uL    Hemoglobin 14.8 11.7 - 15.7 g/dL    Hematocrit 44.3 35.0 - 47.0 %    MCV 93 78 - 100 fL    MCH 31.1 26.5 - 33.0 pg    MCHC 33.4 31.5 - 36.5 g/dL    RDW 12.2 10.0 - 15.0 %    Platelet Count 276 150 - 450 10e3/uL    % Neutrophils 57 %    % Lymphocytes 31 %    % Monocytes 9 %    % Eosinophils 2 %    % Basophils 1 %    % Immature Granulocytes 0 %    NRBCs per 100 WBC 0 <1 /100     Absolute Neutrophils 3.0 1.6 - 8.3 10e3/uL    Absolute Lymphocytes 1.6 0.8 - 5.3 10e3/uL    Absolute Monocytes 0.5 0.0 - 1.3 10e3/uL    Absolute Eosinophils 0.1 0.0 - 0.7 10e3/uL    Absolute Basophils 0.0 0.0 - 0.2 10e3/uL    Absolute Immature Granulocytes 0.0 <=0.4 10e3/uL    Absolute NRBCs 0.0 10e3/uL   CBC with Platelets & Differential     Status: None    Narrative    The following orders were created for panel order CBC with Platelets & Differential.  Procedure                               Abnormality         Status                     ---------                               -----------         ------                     CBC with platelets and d...[[id number]]                      Final result                 Please view results for these tests on the individual orders.         ASSESSMENT/PLAN:   (N95.1) Menopausal flushing  (primary encounter diagnosis)  Comment: has mirena. Will see if in menopause. Sounds like she is   Plan: Follicle stimulating hormone, CANCELED: Wet         prep        FSH ordered     (Z01.419) Well woman exam  Comment: healthy femalt   Plan: CBC with Platelets & Differential, Basic         metabolic panel            (B97.7) High risk HPV infection  Comment: in past with abnormal pap/ cervical cancer in situ. 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 Pap done-- has very tight cervical stenosis   Plan: CBC with Platelets & Differential, Basic         metabolic panel, A pap thin layer screen with          HPV - recommended age 30 - 65 years           (Z12.39) Breast cancer screening, high risk patient  Comment: Jada model 15-17 percent chance of lifetime.   Plan: CBC with Platelets & Differential, Basic         metabolic panel       "  Keep up with yearly mammogram     (Z80.3) Family history of breast cancer in sister  Comment: BRCA negative   Plan: CBC with Platelets & Differential, Basic         metabolic panel            (M76.899) Hamstring tendinitis  Comment: improving slowly with new procedure that was done  Plan: CBC with Platelets & Differential, Basic         metabolic panel            (K14.9) Tongue irritation  Comment: could be flonase. KOH negative   Plan: KOH prep (Other than skin, nails, hair)            (L98.9) Skin lesion  Comment: left arm  Plan: discussed-- will have her return for bx/removal.         COUNSELING:  Reviewed preventive health counseling, as reflected in patient instructions       Regular exercise       Healthy diet/nutrition    Estimated body mass index is 20.25 kg/m  as calculated from the following:    Height as of 1/24/22: 1.626 m (5' 4\").    Weight as of this encounter: 53.5 kg (118 lb).        She reports that she has never smoked. She has never used smokeless tobacco.      Counseling Resources:  ATP IV Guidelines  Pooled Cohorts Equation Calculator  Breast Cancer Risk Calculator  BRCA-Related Cancer Risk Assessment: FHS-7 Tool  FRAX Risk Assessment  ICSI Preventive Guidelines  Dietary Guidelines for Americans, 2010  USDA's MyPlate  ASA Prophylaxis  Lung CA Screening    Carlos Hart MD  [facility] - [place]  "

## 2022-05-25 NOTE — TELEPHONE ENCOUNTER
Call pt -- Life time Breast cancer risk under 20% at around 15-17 percent  - Not candidate for MRI.         Patient notified and stated understanding.

## 2022-05-25 NOTE — NURSING NOTE
"Chief Complaint   Patient presents with     Physical       Initial /77 (BP Location: Left arm, Patient Position: Sitting, Cuff Size: Adult Regular)   Pulse 73   Temp 97.9  F (36.6  C) (Tympanic)   Resp 16   Wt 53.5 kg (118 lb)   SpO2 100%   BMI 20.25 kg/m   Estimated body mass index is 20.25 kg/m  as calculated from the following:    Height as of 1/24/22: 1.626 m (5' 4\").    Weight as of this encounter: 53.5 kg (118 lb).  Medication Reconciliation: complete  Jaya Gupta LPN  "

## 2022-05-31 LAB
BKR LAB AP GYN ADEQUACY: NORMAL
BKR LAB AP GYN INTERPRETATION: NORMAL
BKR LAB AP HPV REFLEX: NORMAL
BKR LAB AP PREVIOUS ABNORMAL: NORMAL
PATH REPORT.COMMENTS IMP SPEC: NORMAL
PATH REPORT.COMMENTS IMP SPEC: NORMAL
PATH REPORT.RELEVANT HX SPEC: NORMAL

## 2022-06-02 LAB
HUMAN PAPILLOMA VIRUS 16 DNA: NEGATIVE
HUMAN PAPILLOMA VIRUS 18 DNA: NEGATIVE
HUMAN PAPILLOMA VIRUS FINAL DIAGNOSIS: NORMAL
HUMAN PAPILLOMA VIRUS OTHER HR: NEGATIVE

## 2022-06-22 ENCOUNTER — TRANSFERRED RECORDS (OUTPATIENT)
Dept: HEALTH INFORMATION MANAGEMENT | Facility: CLINIC | Age: 52
End: 2022-06-22

## 2022-06-28 ENCOUNTER — OFFICE VISIT (OUTPATIENT)
Dept: FAMILY MEDICINE | Facility: OTHER | Age: 52
End: 2022-06-28
Attending: FAMILY MEDICINE
Payer: COMMERCIAL

## 2022-06-28 VITALS
RESPIRATION RATE: 16 BRPM | TEMPERATURE: 97.3 F | OXYGEN SATURATION: 98 % | DIASTOLIC BLOOD PRESSURE: 72 MMHG | HEART RATE: 64 BPM | BODY MASS INDEX: 20.25 KG/M2 | SYSTOLIC BLOOD PRESSURE: 108 MMHG | WEIGHT: 118 LBS

## 2022-06-28 DIAGNOSIS — D22.62: Primary | ICD-10-CM

## 2022-06-28 DIAGNOSIS — M77.8 RIGHT SHOULDER TENDONITIS: ICD-10-CM

## 2022-06-28 PROCEDURE — 88305 TISSUE EXAM BY PATHOLOGIST: CPT | Mod: TC | Performed by: FAMILY MEDICINE

## 2022-06-28 PROCEDURE — 11104 PUNCH BX SKIN SINGLE LESION: CPT | Performed by: FAMILY MEDICINE

## 2022-06-28 PROCEDURE — 88305 TISSUE EXAM BY PATHOLOGIST: CPT | Mod: 26 | Performed by: PATHOLOGY

## 2022-06-28 PROCEDURE — 11105 PUNCH BX SKIN EA SEP/ADDL: CPT | Performed by: FAMILY MEDICINE

## 2022-06-28 PROCEDURE — 99213 OFFICE O/P EST LOW 20 MIN: CPT | Mod: 25 | Performed by: FAMILY MEDICINE

## 2022-06-28 ASSESSMENT — PAIN SCALES - GENERAL: PAINLEVEL: NO PAIN (0)

## 2022-06-28 NOTE — NURSING NOTE
"Chief Complaint   Patient presents with     Procedure       Initial /72 (BP Location: Right arm, Patient Position: Sitting, Cuff Size: Adult Regular)   Pulse 64   Temp 97.3  F (36.3  C) (Tympanic)   Resp 16   Wt 53.5 kg (118 lb)   SpO2 98%   BMI 20.25 kg/m   Estimated body mass index is 20.25 kg/m  as calculated from the following:    Height as of 1/24/22: 1.626 m (5' 4\").    Weight as of this encounter: 53.5 kg (118 lb).  Medication Reconciliation: complete  Jaya Gupta LPN  "

## 2022-06-28 NOTE — PROGRESS NOTES
Assessment & Plan     Atypical nevus of left deltoid region  Will remove both on left side. Right side - very small similar one - will see what left ones are under bx review  - Surgical Pathology Exam  - Surgical Pathology Exam  - NC SURGICAL TRAY - MINOR    Right shoulder tendonitis  Getting worse - wants repeat steroid shot. Will refer   - Orthopedic  Referral; Future                 No follow-ups on file.    Carlos Hart MD  [facility] - [place]    Subjective   Kacy is a 52 year old, presenting for the following health issues:  Procedure      HPI     Skin Lesion  Onset/Duration: years  Description  Location: left- 2  and right bicep- start of similar one  Color: brown  Border description:  flat,  Pigmented   Character: round, minimal  raised  Itching: no  Bleeding:  no  Intensity:  mild  Progression of Symptoms:  same  Accompanying signs and symptoms:   Bleeding: no  Scaling: no  Excessive sun exposure/tanning: no  Sunscreen used: YES  History:           Any previous history of skin cancer: no  Any family history of melanoma: YES  Previous episodes of similar lesion: no  Precipitating or alleviating factors: none  Therapies tried and outcome: none        Review of Systems   Constitutional, HEENT, cardiovascular, pulmonary, gi and gu systems are negative, except as otherwise noted.      Objective    /72 (BP Location: Right arm, Patient Position: Sitting, Cuff Size: Adult Regular)   Pulse 64   Temp 97.3  F (36.3  C) (Tympanic)   Resp 16   Wt 53.5 kg (118 lb)   SpO2 98%   BMI 20.25 kg/m    Body mass index is 20.25 kg/m .  Physical Exam   GENERAL: healthy, alert and no distress  SKIN: flat pigmented rounded lesions about 4-5 mm - one superior and one inferior distal deltoid region                    .  .. Subjective: Kacy METCALF Courtney a 52 year old female who presents today for lesion removal. The 2 similar  Lesions superior and inferior is located on the arms,  and measures  0.4-5cm.  She denies other significant symptoms on ROS. Medications reviewed.    Objective: The area was prepped and appropriately anesthetized. Using the usual technique, punch biopsy size 5 mm was performed. The total area excised, including lesion and margins was 0.5 sm. An appropriate  dressing was applied.  The procedure was well tolerated and without complications. Specimen was sent.    Assessment: atypical nevus times 2     Plan: Follow up: Return for suture removal in 7 days. Wound care instructions provided.  Patient was instructed to be alert for any signs of cutaneous infection.

## 2022-07-01 LAB
PATH REPORT.COMMENTS IMP SPEC: NORMAL
PATH REPORT.FINAL DX SPEC: NORMAL
PATH REPORT.GROSS SPEC: NORMAL
PATH REPORT.MICROSCOPIC SPEC OTHER STN: NORMAL
PATH REPORT.RELEVANT HX SPEC: NORMAL
PHOTO IMAGE: NORMAL

## 2022-07-27 ENCOUNTER — TRANSFERRED RECORDS (OUTPATIENT)
Dept: HEALTH INFORMATION MANAGEMENT | Facility: OTHER | Age: 52
End: 2022-07-27

## 2022-09-01 ENCOUNTER — NURSE TRIAGE (OUTPATIENT)
Dept: FAMILY MEDICINE | Facility: OTHER | Age: 52
End: 2022-09-01

## 2022-09-01 ENCOUNTER — HOSPITAL ENCOUNTER (EMERGENCY)
Facility: HOSPITAL | Age: 52
Discharge: HOME | End: 2022-09-01
Attending: NURSE PRACTITIONER | Admitting: NURSE PRACTITIONER
Payer: COMMERCIAL

## 2022-09-01 VITALS
OXYGEN SATURATION: 100 % | DIASTOLIC BLOOD PRESSURE: 82 MMHG | SYSTOLIC BLOOD PRESSURE: 132 MMHG | RESPIRATION RATE: 16 BRPM | HEART RATE: 86 BPM | TEMPERATURE: 96.9 F

## 2022-09-01 DIAGNOSIS — G93.32 POST-COVID CHRONIC FATIGUE: ICD-10-CM

## 2022-09-01 DIAGNOSIS — U09.9 POST-COVID CHRONIC FATIGUE: ICD-10-CM

## 2022-09-01 DIAGNOSIS — U09.9 POST-COVID SYNDROME: ICD-10-CM

## 2022-09-01 PROCEDURE — 99213 OFFICE O/P EST LOW 20 MIN: CPT | Performed by: NURSE PRACTITIONER

## 2022-09-01 PROCEDURE — G0463 HOSPITAL OUTPT CLINIC VISIT: HCPCS

## 2022-09-01 RX ORDER — ALBUTEROL SULFATE 90 UG/1
2 AEROSOL, METERED RESPIRATORY (INHALATION) EVERY 6 HOURS PRN
Qty: 18 G | Refills: 0 | Status: SHIPPED | OUTPATIENT
Start: 2022-09-01 | End: 2023-10-12

## 2022-09-01 RX ORDER — BENZONATATE 100 MG/1
100 CAPSULE ORAL 3 TIMES DAILY PRN
Qty: 20 CAPSULE | Refills: 0 | Status: SHIPPED | OUTPATIENT
Start: 2022-09-01 | End: 2023-03-30

## 2022-09-01 RX ORDER — FLUTICASONE PROPIONATE 50 MCG
1 SPRAY, SUSPENSION (ML) NASAL DAILY
Qty: 16 G | Refills: 0 | Status: SHIPPED | OUTPATIENT
Start: 2022-09-01 | End: 2023-03-30

## 2022-09-01 ASSESSMENT — ENCOUNTER SYMPTOMS
HEMATOLOGIC/LYMPHATIC NEGATIVE: 1
SINUS PRESSURE: 1
CARDIOVASCULAR NEGATIVE: 1
ALLERGIC/IMMUNOLOGIC NEGATIVE: 1
FATIGUE: 1
EYES NEGATIVE: 1
GASTROINTESTINAL NEGATIVE: 1
HEADACHES: 1
PSYCHIATRIC NEGATIVE: 1
WHEEZING: 0
COUGH: 1
APPETITE CHANGE: 1
SHORTNESS OF BREATH: 0
ENDOCRINE NEGATIVE: 1

## 2022-09-01 ASSESSMENT — ACTIVITIES OF DAILY LIVING (ADL): ADLS_ACUITY_SCORE: 35

## 2022-09-01 NOTE — DISCHARGE INSTRUCTIONS
Rest  Stay hydrated   Sinus rinsing   Slowly increase activity as able     Can try afrin nasal for 3 days

## 2022-09-01 NOTE — ED PROVIDER NOTES
History   No chief complaint on file.    The history is provided by the patient.     Kacy Valdez is a 52 year old female who patient presents to the  wit sinus pain, cough and fatigue since 8/20/22.  She tested positive for COVID on 8/23/22 per home test.  Current symptomatic treatment cold medicine, decongestants and tylenol.  vaccinated for COVID - last dose about a year ago.  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     Priority: Medium     Chondromalacia of patella, right 12/18/2018     Priority: Medium     Subluxation of left patella, initial encounter 12/18/2018     Priority: Medium     Subluxation of right patella, initial encounter 12/18/2018     Priority: Medium     Stenosis, cervix 08/23/2017     Priority: Medium     Requires dilation with insertion of IUD       Nut allergy 07/14/2017     Priority: Medium     Breast cancer screening, high risk patient 09/15/2015     Priority: Medium     High risk HPV infection 06/12/2015     Priority: Medium     Not 16 or 18       Family history of breast cancer in sister 05/21/2014     Priority: Medium     MRI due every other year 2017 next       Depression with anxiety 05/21/2014     Priority: Medium     Annual physical exam 05/08/2013     Priority: Medium     History of abnormal Pap smear 05/08/2013     Priority: Medium     HGSIL LEEP       Family planning 05/08/2013     Priority: Medium     Mirena 5/1/12       Dysthymia 05/08/2013     Priority: Medium     Carcinoma in situ of cervix uteri 05/19/2009     Priority: Medium        Past Medical History:    Past Medical History:   Diagnosis Date     Carcinoma in situ of cervix uteri 05/19/2009      Dysthymic disorder 08/08/2001     FH: breast cancer in first degree relative when <50 years old      Other acne 01/10/2002     Panic disorder without agoraphobia 01/03/2011     Papanicolaou smear of cervix with high grade squamous intraepithelial lesion (HGSIL) 04/27/2009     Unspecified sinusitis (chronic) 06/05/2003       Past Surgical History:    Past Surgical History:   Procedure Laterality Date     ------------OTHER-------------  2009    LEEP     ------------OTHER-------------  2005    IUD     BIOPSY BREAST      breast lump     BUNIONECTOMY  2010     COLONOSCOPY N/A 10/4/2018    Repeat in 2028//Procedure: COLONOSCOPY;  COLONSCOPY with Polypectomy;  Surgeon: Jese Rizzo MD;  Location: [facility]     IR CONSULTATION FOR IR EXAM  1/10/2020     IR TRIGGER POINT INJ 1 OR 2 MUSCLES  1/29/2020     IR TRIGGER POINT INJ 1 OR 2 MUSCLES  9/21/2020     IR TRIGGER POINT INJ 1 OR 2 MUSCLES  2/24/2021     IR TRIGGER POINT INJ 1 OR 2 MUSCLES  11/24/2021       Family History:    Family History   Problem Relation Age of Onset     Cancer Sister 48        breast     Breast Cancer Paternal Grandmother 71       Social History:  Marital Status:   [2]  Social History     Tobacco Use     Smoking status: Never Smoker     Smokeless tobacco: Never Used   Vaping Use     Vaping Use: Never used   Substance Use Topics     Alcohol use: No     Drug use: No        Medications:    albuterol (PROAIR HFA/PROVENTIL HFA/VENTOLIN HFA) 108 (90 Base) MCG/ACT inhaler  benzonatate (TESSALON) 100 MG capsule  EPINEPHrine (EPIPEN/ADRENACLICK/OR ANY BX GENERIC EQUIV) 0.3 MG/0.3ML injection 2-pack  fluticasone (FLONASE) 50 MCG/ACT nasal spray  ibuprofen (ADVIL/MOTRIN) 600 MG tablet  levonorgestrel (MIRENA) 20 MCG/24HR IUD  nystatin-triamcinolone (MYCOLOG II) 221526-1.1 UNIT/GM-% external cream  PARoxetine (PAXIL) 10 MG tablet          Review of Systems   Constitutional: Positive for appetite change and fatigue.   HENT: Positive for sinus pressure.          Bilateral ear pressure    Eyes: Negative.    Respiratory: Positive for cough. Negative for shortness of breath and wheezing.    Cardiovascular: Negative.    Gastrointestinal: Negative.    Endocrine: Negative.    Genitourinary: Negative.    Musculoskeletal:        Continues to have some body aches   Skin: Negative.    Allergic/Immunologic: Negative.    Neurological: Positive for headaches.   Hematological: Negative.    Psychiatric/Behavioral: Negative.        Physical Exam   BP: 132/82  Pulse: 86  Temp: 96.9  F (36.1  C)  Resp: 16  SpO2: 100 %      Physical Exam  Vitals and nursing note reviewed.   Constitutional:       Appearance: She is ill-appearing.   HENT:      Right Ear: A middle ear effusion is present.      Left Ear: A middle ear effusion is present.      Nose: Mucosal edema present.      Mouth/Throat:      Mouth: Mucous membranes are moist.      Pharynx: No oropharyngeal exudate or posterior oropharyngeal erythema.   Cardiovascular:      Rate and Rhythm: Normal rate.      Pulses: Normal pulses.      Heart sounds: Normal heart sounds.   Pulmonary:      Effort: Pulmonary effort is normal. No respiratory distress.      Breath sounds: Normal breath sounds. No wheezing.   Abdominal:      General: Abdomen is flat.      Palpations: Abdomen is soft.      Tenderness: There is no abdominal tenderness.   Skin:     General: Skin is warm and dry.   Neurological:      General: No focal deficit present.      Mental Status: She is alert and oriented to person, place, and time.   Psychiatric:         Mood and Affect: Mood normal.         Behavior: Behavior normal.         ED Course                 Procedures              Critical Care time:  none               No results found for this or any previous visit (from the past 24 hour(s)).    Medications - No data to display    Assessments & Plan (with Medical Decision Making)     I have reviewed the nursing notes.    I have reviewed the findings, diagnosis, plan and need for  follow up with the patient.  Oxygen saturations 100% and lungs clear       Patient verbally educated and given appropriate education sheets for each of the diagnoses and has no questions.  Take OTC motrin or tylenol as directed on the bottle as needed.  Take prescription medications as directed.  Increase fluids, wash hands often.  Sleep in a recliner or with multiple pillows until this has resolved.  Follow up with your provider if symptoms increase or if further concerns develop, return to the ER.      Discussed with patient post covid symptoms can last for a few months.  Discussed continued symptomatic care.  Follow up with Dr Hart if no improvement or return her if worsening symptoms or any concerns     Rest  Stay hydrated   Sinus rinsing followed with nasal steroid  Albuterol inhaler as needed for cough and SOB  Slowly increase activity as able     Can try afrin nasal for 3 days     New Prescriptions    ALBUTEROL (PROAIR HFA/PROVENTIL HFA/VENTOLIN HFA) 108 (90 BASE) MCG/ACT INHALER    Inhale 2 puffs into the lungs every 6 hours as needed for shortness of breath / dyspnea or wheezing    BENZONATATE (TESSALON) 100 MG CAPSULE    Take 1 capsule (100 mg) by mouth 3 times daily as needed for cough    FLUTICASONE (FLONASE) 50 MCG/ACT NASAL SPRAY    Spray 1 spray into both nostrils daily       Final diagnoses:   Post-COVID chronic fatigue   Post-COVID syndrome       9/1/2022   [facility]     Mayra Sousa APRN CNP  09/01/22 [id number]

## 2022-09-01 NOTE — TELEPHONE ENCOUNTER
"Patient  Requesting an overbook tomorrow with provider if possible.    Reason for Disposition    Earache    Patient wants to be seen    Answer Assessment - Initial Assessment Questions  1. LOCATION: \"Where does it hurt?\"       Eyes and ears  2. ONSET: \"When did the sinus pain start?\"  (e.g., hours, days)       Saturday  3. SEVERITY: \"How bad is the pain?\"   (Scale 1-10; mild, moderate or severe)    - MILD (1-3): doesn't interfere with normal activities     - MODERATE (4-7): interferes with normal activities (e.g., work or school) or awakens from sleep    - SEVERE (8-10): excruciating pain and patient unable to do any normal activities         mild  4. RECURRENT SYMPTOM: \"Have you ever had sinus problems before?\" If Yes, ask: \"When was the last time?\" and \"What happened that time?\"       No  Patient recovering from Covid  5. NASAL CONGESTION: \"Is the nose blocked?\" If Yes, ask: \"Can you open it or must you breathe through your mouth?\"      yes  6. NASAL DISCHARGE: \"Do you have discharge from your nose?\" If so ask, \"What color?\"      yellow  7. FEVER: \"Do you have a fever?\" If Yes, ask: \"What is it, how was it measured, and when did it start?\"       Not for 2 days  8. OTHER SYMPTOMS: \"Do you have any other symptoms?\" (e.g., sore throat, cough, earache, difficulty breathing)      Cough ear pressure   9. PREGNANCY: \"Is there any chance you are pregnant?\" \"When was your last menstrual period?\"      no    Protocols used: SINUS PAIN OR CONGESTION-A-OH      "

## 2022-09-01 NOTE — ED TRIAGE NOTES
Pt presents with sinus pain abd pressure, cough and fatigue since 8-20-22. Had a positive covid on 8-23-22.

## 2022-09-02 ENCOUNTER — OFFICE VISIT (OUTPATIENT)
Dept: FAMILY MEDICINE | Facility: OTHER | Age: 52
End: 2022-09-02
Attending: FAMILY MEDICINE
Payer: COMMERCIAL

## 2022-09-02 VITALS
SYSTOLIC BLOOD PRESSURE: 124 MMHG | BODY MASS INDEX: 19.6 KG/M2 | RESPIRATION RATE: 16 BRPM | HEART RATE: 74 BPM | OXYGEN SATURATION: 99 % | TEMPERATURE: 96.8 F | DIASTOLIC BLOOD PRESSURE: 72 MMHG | WEIGHT: 114.2 LBS

## 2022-09-02 DIAGNOSIS — J01.00 ACUTE NON-RECURRENT MAXILLARY SINUSITIS: Primary | ICD-10-CM

## 2022-09-02 PROCEDURE — 99213 OFFICE O/P EST LOW 20 MIN: CPT | Performed by: FAMILY MEDICINE

## 2022-09-02 RX ORDER — HYDROXYZINE HYDROCHLORIDE 25 MG/1
TABLET, FILM COATED ORAL
COMMUNITY
Start: 2022-04-26 | End: 2023-03-30

## 2022-09-02 RX ORDER — METHYLPREDNISOLONE 4 MG
TABLET, DOSE PACK ORAL
Qty: 21 TABLET | Refills: 0 | Status: SHIPPED | OUTPATIENT
Start: 2022-09-02 | End: 2023-03-30

## 2022-09-02 RX ORDER — AMOXICILLIN AND CLAVULANATE POTASSIUM 500; 125 MG/1; MG/1
1 TABLET, FILM COATED ORAL 2 TIMES DAILY
Qty: 20 TABLET | Refills: 0 | Status: SHIPPED | OUTPATIENT
Start: 2022-09-02 | End: 2023-03-30

## 2022-09-02 ASSESSMENT — PAIN SCALES - GENERAL: PAINLEVEL: MODERATE PAIN (5)

## 2022-09-02 NOTE — PROGRESS NOTES
Assessment & Plan     Acute non-recurrent maxillary sinusitis  After covid-- will add the below. Ears look normal.  Continue current medications and behavioral changes.   Symptomatic treatment was discussed along what is available for OTC medications for symptomatic relief.   Symptomatic treatment was discussed along when patient should call and/or come back into the clinic or go to ER/Urgent care. All questions answered.   - amoxicillin-clavulanate (AUGMENTIN) 500-125 MG tablet; Take 1 tablet by mouth 2 times daily  - methylPREDNISolone (MEDROL DOSEPAK) 4 MG tablet therapy pack; Follow Package Directions               No follow-ups on file.    Carlos Hart MD  [facility] - ELI Woodruff is a 52 year old, presenting for the following health issues:  Ear Problem      HPI     Acute Illness  Acute illness concerns: Bilateral ear pain  Onset/Duration: 1 week   Symptoms:  Fever: No  Chills/Sweats: No  Headache (location?): YES  Sinus Pressure: YES  Conjunctivitis:  No  Ear Pain: YES: bilateral  Rhinorrhea: No  Congestion: YES  Sore Throat: YES  Cough: YES-non-productive  Wheeze: No  Decreased Appetite: YES  Nausea: No  Vomiting: No  Diarrhea: No  Dysuria/Freq.: No  Dysuria or Hematuria: No  Fatigue/Achiness: YES  Sick/Strep Exposure: No  Therapies tried and outcome: Flonase, Tessalon, mucinex, sudafed, ibuprofen, acetaminophen, cough medicine    Started with covid   Then now sinus pain and pressure   Increase ear pain   Plugged ears       Review of Systems   Constitutional, HEENT, cardiovascular, pulmonary, gi and gu systems are negative, except as otherwise noted.      Objective    /72 (BP Location: Right arm, Patient Position: Chair, Cuff Size: Adult Regular)   Pulse 74   Temp 96.8  F (36  C) (Tympanic)   Resp 16   Wt 51.8 kg (114 lb 3.2 oz)   SpO2 99%   BMI 19.60 kg/m    Body mass index is 19.6 kg/m .  Physical Exam   GENERAL: healthy, alert and no distress  HENT: ear  canals and TM's normal, nose and mouth without ulcers or lesions-- sinus pain and tenderness over maxillary   NECK: no adenopathy, no asymmetry, masses, or scars and thyroid normal to palpation  RESP: lungs clear to auscultation - no rales, rhonchi or wheezes  CV: regular rate and rhythm, normal S1 S2, no S3 or S4, no murmur, click or rub, no peripheral edema and peripheral pulses strong                    [unfilled]  [unfilled]

## 2022-09-02 NOTE — NURSING NOTE
"Chief Complaint   Patient presents with     Ear Problem       Initial /72 (BP Location: Right arm, Patient Position: Chair, Cuff Size: Adult Regular)   Pulse 74   Temp 96.8  F (36  C) (Tympanic)   Resp 16   Wt 51.8 kg (114 lb 3.2 oz)   SpO2 99%   BMI 19.60 kg/m   Estimated body mass index is 19.6 kg/m  as calculated from the following:    Height as of 1/24/22: 1.626 m (5' 4\").    Weight as of this encounter: 51.8 kg (114 lb 3.2 oz).  Medication Reconciliation: complete  Shelley Topete LPN  "

## 2022-10-22 ENCOUNTER — HEALTH MAINTENANCE LETTER (OUTPATIENT)
Age: 52
End: 2022-10-22

## 2023-01-09 ENCOUNTER — MYC MEDICAL ADVICE (OUTPATIENT)
Dept: FAMILY MEDICINE | Facility: OTHER | Age: 53
End: 2023-01-09

## 2023-01-09 DIAGNOSIS — M25.519 CHRONIC SHOULDER PAIN, UNSPECIFIED LATERALITY: Primary | ICD-10-CM

## 2023-01-09 DIAGNOSIS — G89.29 CHRONIC SHOULDER PAIN, UNSPECIFIED LATERALITY: Primary | ICD-10-CM

## 2023-03-01 ENCOUNTER — TRANSFERRED RECORDS (OUTPATIENT)
Dept: HEALTH INFORMATION MANAGEMENT | Facility: CLINIC | Age: 53
End: 2023-03-01

## 2023-03-03 ENCOUNTER — TELEPHONE (OUTPATIENT)
Dept: ORTHOPEDICS | Facility: OTHER | Age: 53
End: 2023-03-03
Payer: COMMERCIAL

## 2023-03-03 DIAGNOSIS — M25.511 RIGHT SHOULDER PAIN, UNSPECIFIED CHRONICITY: Primary | ICD-10-CM

## 2023-03-03 NOTE — TELEPHONE ENCOUNTER
Needs OP PT to start 5-7 days post-surgery. Karan   4/10/23  right shoulder arthroscopy,subacromial decompression,distal clavicle excision,possible rotator cuff repair,possible biceps tenodesis. alex

## 2023-03-30 ENCOUNTER — OFFICE VISIT (OUTPATIENT)
Dept: FAMILY MEDICINE | Facility: OTHER | Age: 53
End: 2023-03-30
Attending: FAMILY MEDICINE
Payer: COMMERCIAL

## 2023-03-30 ENCOUNTER — LAB (OUTPATIENT)
Dept: LAB | Facility: OTHER | Age: 53
End: 2023-03-30
Attending: FAMILY MEDICINE
Payer: COMMERCIAL

## 2023-03-30 VITALS
HEIGHT: 63 IN | WEIGHT: 115 LBS | TEMPERATURE: 98 F | RESPIRATION RATE: 16 BRPM | HEART RATE: 78 BPM | DIASTOLIC BLOOD PRESSURE: 70 MMHG | BODY MASS INDEX: 20.38 KG/M2 | SYSTOLIC BLOOD PRESSURE: 108 MMHG | OXYGEN SATURATION: 98 %

## 2023-03-30 DIAGNOSIS — M75.41 IMPINGEMENT SYNDROME OF SHOULDER REGION, RIGHT: ICD-10-CM

## 2023-03-30 DIAGNOSIS — Z12.39 BREAST CANCER SCREENING, HIGH RISK PATIENT: ICD-10-CM

## 2023-03-30 DIAGNOSIS — Z01.810 PRE-OPERATIVE CARDIOVASCULAR EXAMINATION: ICD-10-CM

## 2023-03-30 DIAGNOSIS — Z78.0 MENOPAUSE: ICD-10-CM

## 2023-03-30 DIAGNOSIS — Z80.3 FAMILY HISTORY OF BREAST CANCER IN SISTER: ICD-10-CM

## 2023-03-30 DIAGNOSIS — Z01.810 PRE-OPERATIVE CARDIOVASCULAR EXAMINATION: Primary | ICD-10-CM

## 2023-03-30 LAB
ANION GAP SERPL CALCULATED.3IONS-SCNC: 10 MMOL/L (ref 7–15)
BASOPHILS # BLD AUTO: 0 10E3/UL (ref 0–0.2)
BASOPHILS NFR BLD AUTO: 1 %
BUN SERPL-MCNC: 16.4 MG/DL (ref 6–20)
CALCIUM SERPL-MCNC: 9.9 MG/DL (ref 8.6–10)
CHLORIDE SERPL-SCNC: 104 MMOL/L (ref 98–107)
CREAT SERPL-MCNC: 0.78 MG/DL (ref 0.51–0.95)
DEPRECATED HCO3 PLAS-SCNC: 27 MMOL/L (ref 22–29)
EOSINOPHIL # BLD AUTO: 0.1 10E3/UL (ref 0–0.7)
EOSINOPHIL NFR BLD AUTO: 1 %
ERYTHROCYTE [DISTWIDTH] IN BLOOD BY AUTOMATED COUNT: 12.2 % (ref 10–15)
GFR SERPL CREATININE-BSD FRML MDRD: 89 ML/MIN/1.73M2
GLUCOSE SERPL-MCNC: 115 MG/DL (ref 70–99)
HCT VFR BLD AUTO: 43.7 % (ref 35–47)
HGB BLD-MCNC: 15.3 G/DL (ref 11.7–15.7)
IMM GRANULOCYTES # BLD: 0 10E3/UL
IMM GRANULOCYTES NFR BLD: 0 %
LYMPHOCYTES # BLD AUTO: 1.4 10E3/UL (ref 0.8–5.3)
LYMPHOCYTES NFR BLD AUTO: 33 %
MCH RBC QN AUTO: 31.2 PG (ref 26.5–33)
MCHC RBC AUTO-ENTMCNC: 35 G/DL (ref 31.5–36.5)
MCV RBC AUTO: 89 FL (ref 78–100)
MONOCYTES # BLD AUTO: 0.5 10E3/UL (ref 0–1.3)
MONOCYTES NFR BLD AUTO: 12 %
NEUTROPHILS # BLD AUTO: 2.3 10E3/UL (ref 1.6–8.3)
NEUTROPHILS NFR BLD AUTO: 53 %
NRBC # BLD AUTO: 0 10E3/UL
NRBC BLD AUTO-RTO: 0 /100
PLATELET # BLD AUTO: 242 10E3/UL (ref 150–450)
POTASSIUM SERPL-SCNC: 4.1 MMOL/L (ref 3.4–5.3)
RBC # BLD AUTO: 4.9 10E6/UL (ref 3.8–5.2)
SODIUM SERPL-SCNC: 141 MMOL/L (ref 136–145)
WBC # BLD AUTO: 4.3 10E3/UL (ref 4–11)

## 2023-03-30 PROCEDURE — 36415 COLL VENOUS BLD VENIPUNCTURE: CPT

## 2023-03-30 PROCEDURE — 80048 BASIC METABOLIC PNL TOTAL CA: CPT

## 2023-03-30 PROCEDURE — 85025 COMPLETE CBC W/AUTO DIFF WBC: CPT

## 2023-03-30 PROCEDURE — 99213 OFFICE O/P EST LOW 20 MIN: CPT | Performed by: FAMILY MEDICINE

## 2023-03-30 ASSESSMENT — PAIN SCALES - GENERAL: PAINLEVEL: NO PAIN (0)

## 2023-03-30 ASSESSMENT — PATIENT HEALTH QUESTIONNAIRE - PHQ9: SUM OF ALL RESPONSES TO PHQ QUESTIONS 1-9: 0

## 2023-03-30 NOTE — PROGRESS NOTES
[facility] [place]  [address] [address]  Phone: [phone]  Primary Provider: Jose Denson  Pre-op Performing Provider: JOSE DENSON      PREOPERATIVE EVALUATION:  Today's date: 3/30/2023    Kacy Valdez is a 53 year old female who presents for a preoperative evaluation.  No flowsheet data found.  Surgical Information:  Surgery/Procedure: shoulder arthroscopy right   Surgery Location: [facility]  Surgeon: Dr. Russo  Surgery Date: 4/10/23  Time of Surgery: TBD  Where patient plans to recover: At home with family  Fax number for surgical facility: Note does not need to be faxed, will be available electronically in Epic.    Assessment & Plan     The proposed surgical procedure is considered INTERMEDIATE risk.      ICD-10-CM    1. Pre-operative cardiovascular examination  Z01.810 CBC with Platelets & Differential     Basic metabolic panel     EKG 12-lead complete w/read - Clinics      2. Impingement syndrome of shoulder region, right  M75.41 CBC with Platelets & Differential     Basic metabolic panel     EKG 12-lead complete w/read - Clinics      3. Family history of breast cancer in sister  Z80.3 CBC with Platelets & Differential     Basic metabolic panel     EKG 12-lead complete w/read - Clinics     MA Screening Digital Bilateral      4. Breast cancer screening, high risk patient  Z12.39 CBC with Platelets & Differential     Basic metabolic panel     EKG 12-lead complete w/read - Clinics     MA Screening Digital Bilateral      5. Menopause  Z78.0 DX Hip/Pelvis/Spine                       Medication Instructions:  Patient is to take all scheduled medications on the day of surgery EXCEPT for modifications listed below:  NSAIDS a week before surgery      RECOMMENDATION:  APPROVAL GIVEN to proceed with proposed procedure, without further diagnostic evaluation.          Subjective     HPI related to upcoming procedure:   52 yO female  presents for cardiopulmonary/general clearance to  undergo the above procedure.  His surgeon listed above has asked for this clearance to undergo anesthesia. Pt has had this condition for approximately almost a year .   Overall pt is of good health and has not  had any perioperative complications with previous surgeries.      Very healthy and very active   Preop Questions 3/30/2023   1. Have you ever had a heart attack or stroke? No   2. Have you ever had surgery on your heart or blood vessels, such as a stent placement, a coronary artery bypass, or surgery on an artery in your head, neck, heart, or legs? No   3. Do you have chest pain with activity? No   4. Do you have a history of  heart failure? No   5. Do you currently have a cold, bronchitis or symptoms of other infection? No   6. Do you have a cough, shortness of breath, or wheezing? No   7. Do you or anyone in your family have previous history of blood clots? No   8. Do you or does anyone in your family have a serious bleeding problem such as prolonged bleeding following surgeries or cuts? No   9. Have you ever had problems with anemia or been told to take iron pills? No   10. Have you had any abnormal blood loss such as black, tarry or bloody stools, or abnormal vaginal bleeding? No   11. Have you ever had a blood transfusion? No   12. Are you willing to have a blood transfusion if it is medically needed before, during, or after your surgery? Yes   13. Have you or any of your relatives ever had problems with anesthesia? No   14. Do you have sleep apnea, excessive snoring or daytime drowsiness? No   15. Do you have any artifical heart valves or other implanted medical devices like a pacemaker, defibrillator, or continuous glucose monitor? No   16. Do you have artificial joints? No   17. Are you allergic to latex? No   18. Is there any chance that you may be pregnant? No       Health Care Directive:  Patient does not have a Health Care Directive or Living Will: Discussed advance care planning with patient;  however, patient declined at this time.    Preoperative Review of :   reviewed - no record of controlled substances prescribed.      Status of Chronic Conditions:  See problem list for active medical problems.  Problems all longstanding and stable, except as noted/documented.  See ROS for pertinent symptoms related to these conditions.      Review of Systems  Constitutional, neuro, ENT, endocrine, pulmonary, cardiac, gastrointestinal, genitourinary, musculoskeletal, integument and psychiatric systems are negative, except as otherwise noted.    Patient Active Problem List    Diagnosis Date Noted     Perimenopausal 01/08/2020     Priority: Medium     Chondromalacia of patella, left 12/18/2018     Priority: Medium     Chondromalacia of patella, right 12/18/2018     Priority: Medium     Subluxation of left patella, initial encounter 12/18/2018     Priority: Medium     Subluxation of right patella, initial encounter 12/18/2018     Priority: Medium     Stenosis, cervix 08/23/2017     Priority: Medium     Requires dilation with insertion of IUD       Nut allergy 07/14/2017     Priority: Medium     Breast cancer screening, high risk patient 09/15/2015     Priority: Medium     High risk HPV infection 06/12/2015     Priority: Medium     Not 16 or 18       Family history of breast cancer in sister 05/21/2014     Priority: Medium     MRI due every other year 2017 next       Depression with anxiety 05/21/2014     Priority: Medium     Annual physical exam 05/08/2013     Priority: Medium     History of abnormal Pap smear 05/08/2013     Priority: Medium     HGSIL LEEP       Family planning 05/08/2013     Priority: Medium     Mirena 5/1/12       Dysthymia 05/08/2013     Priority: Medium     Carcinoma in situ of cervix uteri 05/19/2009     Priority: Medium      Past Medical History:   Diagnosis Date     Carcinoma in situ of cervix uteri 05/19/2009     Dysthymic disorder 08/08/2001     FH: breast cancer in first degree relative  when <50 years old     sister Age 41     Other acne 01/10/2002     Panic disorder without agoraphobia 01/03/2011     Papanicolaou smear of cervix with high grade squamous intraepithelial lesion (HGSIL) 04/27/2009     Unspecified sinusitis (chronic) 06/05/2003     Past Surgical History:   Procedure Laterality Date     ------------OTHER-------------  2009    LEEP     ------------OTHER-------------  2005    IUD     BIOPSY BREAST      breast lump     BUNIONECTOMY  2010     COLONOSCOPY N/A 10/4/2018    Repeat in 2028//Procedure: COLONOSCOPY;  COLONSCOPY with Polypectomy;  Surgeon: Jese Rizzo MD;  Location: [facility]     IR CONSULTATION FOR IR EXAM  1/10/2020     IR TRIGGER POINT INJ 1 OR 2 MUSCLES  1/29/2020     IR TRIGGER POINT INJ 1 OR 2 MUSCLES  9/21/2020     IR TRIGGER POINT INJ 1 OR 2 MUSCLES  2/24/2021     IR TRIGGER POINT INJ 1 OR 2 MUSCLES  11/24/2021     Current Outpatient Medications   Medication Sig Dispense Refill     EPINEPHrine (EPIPEN/ADRENACLICK/OR ANY BX GENERIC EQUIV) 0.3 MG/0.3ML injection 2-pack INJECT 0.3 ML (0.3 MG) INTO THE MUSCLE AS NEEDED FOR ANAPHYLAXIS 2 each 3     ibuprofen (ADVIL/MOTRIN) 600 MG tablet TAKE 1 TABLET BY MOUTH EVERY 6 HOURS AS NEEDED FOR PAIN       levonorgestrel (MIRENA) 20 MCG/24HR IUD 1 each (20 mcg) by Intrauterine route continuous       nystatin-triamcinolone (MYCOLOG II) 984090-3.1 UNIT/GM-% external cream Apply small amt to area nightly 100 g 0     PARoxetine (PAXIL) 10 MG tablet Take 1 tablet by mouth once daily 90 tablet 3     albuterol (PROAIR HFA/PROVENTIL HFA/VENTOLIN HFA) 108 (90 Base) MCG/ACT inhaler Inhale 2 puffs into the lungs every 6 hours as needed for shortness of breath / dyspnea or wheezing (Patient not taking: Reported on 3/30/2023) 18 g 0       Allergies   Allergen Reactions     Nuts Other (See Comments)     Nut flavor - throat tightens        Social History     Tobacco Use     Smoking status: Never     Smokeless tobacco: Never   Substance Use Topics  "    Alcohol use: No       History   Drug Use No         Objective     /70 (BP Location: Left arm, Patient Position: Chair, Cuff Size: Adult Regular)   Pulse 78   Temp 98  F (36.7  C) (Tympanic)   Resp 16   Ht 1.6 m (5' 3\")   Wt 52.2 kg (115 lb)   SpO2 98%   BMI 20.37 kg/m      Physical Exam    GENERAL APPEARANCE: healthy, alert and no distress     EYES: EOMI, PERRL     HENT: ear canals and TM's normal and nose and mouth without ulcers or lesions     NECK: no adenopathy, no asymmetry, masses, or scars and thyroid normal to palpation     RESP: lungs clear to auscultation - no rales, rhonchi or wheezes     BREAST: normal without masses, tenderness or nipple discharge and no palpable axillary masses or adenopathy     CV: regular rates and rhythm, normal S1 S2, no S3 or S4 and no murmur, click or rub     ABDOMEN:  soft, nontender, no HSM or masses and bowel sounds normal     MS: extremities normal- no gross deformities noted, no evidence of inflammation in joints, FROM in all extremities.     SKIN: no suspicious lesions or rashes     NEURO: Normal strength and tone, sensory exam grossly normal, mentation intact and speech normal     PSYCH: mentation appears normal. and affect normal/bright     LYMPHATICS: No cervical adenopathy    Recent Labs   Lab Test 05/25/22  0736   HGB 14.8         POTASSIUM 3.9   CR 0.86        Diagnostics:  Recent Results (from the past 24 hour(s))   Basic metabolic panel    Collection Time: 03/30/23 11:08 AM   Result Value Ref Range    Sodium 141 136 - 145 mmol/L    Potassium 4.1 3.4 - 5.3 mmol/L    Chloride 104 98 - 107 mmol/L    Carbon Dioxide (CO2) 27 22 - 29 mmol/L    Anion Gap 10 7 - 15 mmol/L    Urea Nitrogen 16.4 6.0 - 20.0 mg/dL    Creatinine 0.78 0.51 - 0.95 mg/dL    Calcium 9.9 8.6 - 10.0 mg/dL    Glucose 115 (H) 70 - 99 mg/dL    GFR Estimate 89 >60 mL/min/1.73m2   CBC with platelets and differential    Collection Time: 03/30/23 11:08 AM   Result Value Ref " Range    WBC Count 4.3 4.0 - 11.0 10e3/uL    RBC Count 4.90 3.80 - 5.20 10e6/uL    Hemoglobin 15.3 11.7 - 15.7 g/dL    Hematocrit 43.7 35.0 - 47.0 %    MCV 89 78 - 100 fL    MCH 31.2 26.5 - 33.0 pg    MCHC 35.0 31.5 - 36.5 g/dL    RDW 12.2 10.0 - 15.0 %    Platelet Count 242 150 - 450 10e3/uL    % Neutrophils 53 %    % Lymphocytes 33 %    % Monocytes 12 %    % Eosinophils 1 %    % Basophils 1 %    % Immature Granulocytes 0 %    NRBCs per 100 WBC 0 <1 /100    Absolute Neutrophils 2.3 1.6 - 8.3 10e3/uL    Absolute Lymphocytes 1.4 0.8 - 5.3 10e3/uL    Absolute Monocytes 0.5 0.0 - 1.3 10e3/uL    Absolute Eosinophils 0.1 0.0 - 0.7 10e3/uL    Absolute Basophils 0.0 0.0 - 0.2 10e3/uL    Absolute Immature Granulocytes 0.0 <=0.4 10e3/uL    Absolute NRBCs 0.0 10e3/uL      EKG: appears normal, NSR, poor R wave progression - though has some similar EKGs// not q waves , normal axis, normal intervals, no acute ST/T changes c/w ischemia, no LVH by voltage criteria, unchanged from previous tracings    Revised Cardiac Risk Index (RCRI):  The patient has the following serious cardiovascular risks for perioperative complications:   - No serious cardiac risks = 0 points     RCRI Interpretation: 0 points: Class I (very low risk - 0.4% complication rate)           Signed Electronically by: Carlos Hart MD  Copy of this evaluation report is provided to requesting physician.

## 2023-03-30 NOTE — H&P (VIEW-ONLY)
[facility] [place]  [address] [address]  Phone: [phone]  Primary Provider: Jose Denson  Pre-op Performing Provider: JOSE DENSON      PREOPERATIVE EVALUATION:  Today's date: 3/30/2023    Kacy Valdez is a 53 year old female who presents for a preoperative evaluation.  No flowsheet data found.  Surgical Information:  Surgery/Procedure: shoulder arthroscopy right   Surgery Location: [facility]  Surgeon: Dr. Russo  Surgery Date: 4/10/23  Time of Surgery: TBD  Where patient plans to recover: At home with family  Fax number for surgical facility: Note does not need to be faxed, will be available electronically in Epic.    Assessment & Plan     The proposed surgical procedure is considered INTERMEDIATE risk.      ICD-10-CM    1. Pre-operative cardiovascular examination  Z01.810 CBC with Platelets & Differential     Basic metabolic panel     EKG 12-lead complete w/read - Clinics      2. Impingement syndrome of shoulder region, right  M75.41 CBC with Platelets & Differential     Basic metabolic panel     EKG 12-lead complete w/read - Clinics      3. Family history of breast cancer in sister  Z80.3 CBC with Platelets & Differential     Basic metabolic panel     EKG 12-lead complete w/read - Clinics     MA Screening Digital Bilateral      4. Breast cancer screening, high risk patient  Z12.39 CBC with Platelets & Differential     Basic metabolic panel     EKG 12-lead complete w/read - Clinics     MA Screening Digital Bilateral      5. Menopause  Z78.0 DX Hip/Pelvis/Spine                       Medication Instructions:  Patient is to take all scheduled medications on the day of surgery EXCEPT for modifications listed below:  NSAIDS a week before surgery      RECOMMENDATION:  APPROVAL GIVEN to proceed with proposed procedure, without further diagnostic evaluation.          Subjective     HPI related to upcoming procedure:   54 yO female  presents for cardiopulmonary/general clearance to  undergo the above procedure.  His surgeon listed above has asked for this clearance to undergo anesthesia. Pt has had this condition for approximately almost a year .   Overall pt is of good health and has not  had any perioperative complications with previous surgeries.      Very healthy and very active   Preop Questions 3/30/2023   1. Have you ever had a heart attack or stroke? No   2. Have you ever had surgery on your heart or blood vessels, such as a stent placement, a coronary artery bypass, or surgery on an artery in your head, neck, heart, or legs? No   3. Do you have chest pain with activity? No   4. Do you have a history of  heart failure? No   5. Do you currently have a cold, bronchitis or symptoms of other infection? No   6. Do you have a cough, shortness of breath, or wheezing? No   7. Do you or anyone in your family have previous history of blood clots? No   8. Do you or does anyone in your family have a serious bleeding problem such as prolonged bleeding following surgeries or cuts? No   9. Have you ever had problems with anemia or been told to take iron pills? No   10. Have you had any abnormal blood loss such as black, tarry or bloody stools, or abnormal vaginal bleeding? No   11. Have you ever had a blood transfusion? No   12. Are you willing to have a blood transfusion if it is medically needed before, during, or after your surgery? Yes   13. Have you or any of your relatives ever had problems with anesthesia? No   14. Do you have sleep apnea, excessive snoring or daytime drowsiness? No   15. Do you have any artifical heart valves or other implanted medical devices like a pacemaker, defibrillator, or continuous glucose monitor? No   16. Do you have artificial joints? No   17. Are you allergic to latex? No   18. Is there any chance that you may be pregnant? No       Health Care Directive:  Patient does not have a Health Care Directive or Living Will: Discussed advance care planning with patient;  however, patient declined at this time.    Preoperative Review of :   reviewed - no record of controlled substances prescribed.      Status of Chronic Conditions:  See problem list for active medical problems.  Problems all longstanding and stable, except as noted/documented.  See ROS for pertinent symptoms related to these conditions.      Review of Systems  Constitutional, neuro, ENT, endocrine, pulmonary, cardiac, gastrointestinal, genitourinary, musculoskeletal, integument and psychiatric systems are negative, except as otherwise noted.    Patient Active Problem List    Diagnosis Date Noted     Perimenopausal 01/08/2020     Priority: Medium     Chondromalacia of patella, left 12/18/2018     Priority: Medium     Chondromalacia of patella, right 12/18/2018     Priority: Medium     Subluxation of left patella, initial encounter 12/18/2018     Priority: Medium     Subluxation of right patella, initial encounter 12/18/2018     Priority: Medium     Stenosis, cervix 08/23/2017     Priority: Medium     Requires dilation with insertion of IUD       Nut allergy 07/14/2017     Priority: Medium     Breast cancer screening, high risk patient 09/15/2015     Priority: Medium     High risk HPV infection 06/12/2015     Priority: Medium     Not 16 or 18       Family history of breast cancer in sister 05/21/2014     Priority: Medium     MRI due every other year 2017 next       Depression with anxiety 05/21/2014     Priority: Medium     Annual physical exam 05/08/2013     Priority: Medium     History of abnormal Pap smear 05/08/2013     Priority: Medium     HGSIL LEEP       Family planning 05/08/2013     Priority: Medium     Mirena 5/1/12       Dysthymia 05/08/2013     Priority: Medium     Carcinoma in situ of cervix uteri 05/19/2009     Priority: Medium      Past Medical History:   Diagnosis Date     Carcinoma in situ of cervix uteri 05/19/2009     Dysthymic disorder 08/08/2001     FH: breast cancer in first degree relative  when <50 years old     sister Age 41     Other acne 01/10/2002     Panic disorder without agoraphobia 01/03/2011     Papanicolaou smear of cervix with high grade squamous intraepithelial lesion (HGSIL) 04/27/2009     Unspecified sinusitis (chronic) 06/05/2003     Past Surgical History:   Procedure Laterality Date     ------------OTHER-------------  2009    LEEP     ------------OTHER-------------  2005    IUD     BIOPSY BREAST      breast lump     BUNIONECTOMY  2010     COLONOSCOPY N/A 10/4/2018    Repeat in 2028//Procedure: COLONOSCOPY;  COLONSCOPY with Polypectomy;  Surgeon: Jese Rizzo MD;  Location: [facility]     IR CONSULTATION FOR IR EXAM  1/10/2020     IR TRIGGER POINT INJ 1 OR 2 MUSCLES  1/29/2020     IR TRIGGER POINT INJ 1 OR 2 MUSCLES  9/21/2020     IR TRIGGER POINT INJ 1 OR 2 MUSCLES  2/24/2021     IR TRIGGER POINT INJ 1 OR 2 MUSCLES  11/24/2021     Current Outpatient Medications   Medication Sig Dispense Refill     EPINEPHrine (EPIPEN/ADRENACLICK/OR ANY BX GENERIC EQUIV) 0.3 MG/0.3ML injection 2-pack INJECT 0.3 ML (0.3 MG) INTO THE MUSCLE AS NEEDED FOR ANAPHYLAXIS 2 each 3     ibuprofen (ADVIL/MOTRIN) 600 MG tablet TAKE 1 TABLET BY MOUTH EVERY 6 HOURS AS NEEDED FOR PAIN       levonorgestrel (MIRENA) 20 MCG/24HR IUD 1 each (20 mcg) by Intrauterine route continuous       nystatin-triamcinolone (MYCOLOG II) 319055-0.1 UNIT/GM-% external cream Apply small amt to area nightly 100 g 0     PARoxetine (PAXIL) 10 MG tablet Take 1 tablet by mouth once daily 90 tablet 3     albuterol (PROAIR HFA/PROVENTIL HFA/VENTOLIN HFA) 108 (90 Base) MCG/ACT inhaler Inhale 2 puffs into the lungs every 6 hours as needed for shortness of breath / dyspnea or wheezing (Patient not taking: Reported on 3/30/2023) 18 g 0       Allergies   Allergen Reactions     Nuts Other (See Comments)     Nut flavor - throat tightens        Social History     Tobacco Use     Smoking status: Never     Smokeless tobacco: Never   Substance Use Topics  "    Alcohol use: No       History   Drug Use No         Objective     /70 (BP Location: Left arm, Patient Position: Chair, Cuff Size: Adult Regular)   Pulse 78   Temp 98  F (36.7  C) (Tympanic)   Resp 16   Ht 1.6 m (5' 3\")   Wt 52.2 kg (115 lb)   SpO2 98%   BMI 20.37 kg/m      Physical Exam    GENERAL APPEARANCE: healthy, alert and no distress     EYES: EOMI, PERRL     HENT: ear canals and TM's normal and nose and mouth without ulcers or lesions     NECK: no adenopathy, no asymmetry, masses, or scars and thyroid normal to palpation     RESP: lungs clear to auscultation - no rales, rhonchi or wheezes     BREAST: normal without masses, tenderness or nipple discharge and no palpable axillary masses or adenopathy     CV: regular rates and rhythm, normal S1 S2, no S3 or S4 and no murmur, click or rub     ABDOMEN:  soft, nontender, no HSM or masses and bowel sounds normal     MS: extremities normal- no gross deformities noted, no evidence of inflammation in joints, FROM in all extremities.     SKIN: no suspicious lesions or rashes     NEURO: Normal strength and tone, sensory exam grossly normal, mentation intact and speech normal     PSYCH: mentation appears normal. and affect normal/bright     LYMPHATICS: No cervical adenopathy    Recent Labs   Lab Test 05/25/22  0736   HGB 14.8         POTASSIUM 3.9   CR 0.86        Diagnostics:  Recent Results (from the past 24 hour(s))   Basic metabolic panel    Collection Time: 03/30/23 11:08 AM   Result Value Ref Range    Sodium 141 136 - 145 mmol/L    Potassium 4.1 3.4 - 5.3 mmol/L    Chloride 104 98 - 107 mmol/L    Carbon Dioxide (CO2) 27 22 - 29 mmol/L    Anion Gap 10 7 - 15 mmol/L    Urea Nitrogen 16.4 6.0 - 20.0 mg/dL    Creatinine 0.78 0.51 - 0.95 mg/dL    Calcium 9.9 8.6 - 10.0 mg/dL    Glucose 115 (H) 70 - 99 mg/dL    GFR Estimate 89 >60 mL/min/1.73m2   CBC with platelets and differential    Collection Time: 03/30/23 11:08 AM   Result Value Ref " Range    WBC Count 4.3 4.0 - 11.0 10e3/uL    RBC Count 4.90 3.80 - 5.20 10e6/uL    Hemoglobin 15.3 11.7 - 15.7 g/dL    Hematocrit 43.7 35.0 - 47.0 %    MCV 89 78 - 100 fL    MCH 31.2 26.5 - 33.0 pg    MCHC 35.0 31.5 - 36.5 g/dL    RDW 12.2 10.0 - 15.0 %    Platelet Count 242 150 - 450 10e3/uL    % Neutrophils 53 %    % Lymphocytes 33 %    % Monocytes 12 %    % Eosinophils 1 %    % Basophils 1 %    % Immature Granulocytes 0 %    NRBCs per 100 WBC 0 <1 /100    Absolute Neutrophils 2.3 1.6 - 8.3 10e3/uL    Absolute Lymphocytes 1.4 0.8 - 5.3 10e3/uL    Absolute Monocytes 0.5 0.0 - 1.3 10e3/uL    Absolute Eosinophils 0.1 0.0 - 0.7 10e3/uL    Absolute Basophils 0.0 0.0 - 0.2 10e3/uL    Absolute Immature Granulocytes 0.0 <=0.4 10e3/uL    Absolute NRBCs 0.0 10e3/uL      EKG: appears normal, NSR, poor R wave progression - though has some similar EKGs// not q waves , normal axis, normal intervals, no acute ST/T changes c/w ischemia, no LVH by voltage criteria, unchanged from previous tracings    Revised Cardiac Risk Index (RCRI):  The patient has the following serious cardiovascular risks for perioperative complications:   - No serious cardiac risks = 0 points     RCRI Interpretation: 0 points: Class I (very low risk - 0.4% complication rate)           Signed Electronically by: Carlos Hart MD  Copy of this evaluation report is provided to requesting physician.

## 2023-04-03 ENCOUNTER — HOSPITAL ENCOUNTER (OUTPATIENT)
Dept: BONE DENSITY | Facility: HOSPITAL | Age: 53
Discharge: HOME | End: 2023-04-03
Attending: FAMILY MEDICINE | Admitting: FAMILY MEDICINE
Payer: COMMERCIAL

## 2023-04-03 DIAGNOSIS — Z78.0 MENOPAUSE: ICD-10-CM

## 2023-04-03 DIAGNOSIS — M85.89 OSTEOPENIA OF MULTIPLE SITES: Primary | ICD-10-CM

## 2023-04-03 PROCEDURE — 77080 DXA BONE DENSITY AXIAL: CPT

## 2023-04-03 RX ORDER — ALENDRONATE SODIUM 35 MG/1
35 TABLET ORAL
Qty: 12 TABLET | Refills: 3 | Status: SHIPPED | OUTPATIENT
Start: 2023-04-03 | End: 2024-03-13

## 2023-04-07 ENCOUNTER — ANESTHESIA EVENT (OUTPATIENT)
Dept: SURGERY | Facility: OTHER | Age: 53
End: 2023-04-07
Payer: COMMERCIAL

## 2023-04-10 ENCOUNTER — HOSPITAL ENCOUNTER (OUTPATIENT)
Facility: OTHER | Age: 53
Discharge: HOME | End: 2023-04-10
Attending: ORTHOPAEDIC SURGERY | Admitting: ORTHOPAEDIC SURGERY
Payer: COMMERCIAL

## 2023-04-10 ENCOUNTER — ANESTHESIA (OUTPATIENT)
Dept: SURGERY | Facility: OTHER | Age: 53
End: 2023-04-10
Payer: COMMERCIAL

## 2023-04-10 VITALS
HEART RATE: 75 BPM | RESPIRATION RATE: 14 BRPM | DIASTOLIC BLOOD PRESSURE: 72 MMHG | BODY MASS INDEX: 20.38 KG/M2 | WEIGHT: 115 LBS | OXYGEN SATURATION: 96 % | TEMPERATURE: 97 F | SYSTOLIC BLOOD PRESSURE: 114 MMHG | HEIGHT: 63 IN

## 2023-04-10 DIAGNOSIS — M67.819 TENDINOSIS OF ROTATOR CUFF: Primary | ICD-10-CM

## 2023-04-10 PROCEDURE — 29824 SHO ARTHRS SRG DSTL CLAVICLC: CPT | Mod: RT | Performed by: ORTHOPAEDIC SURGERY

## 2023-04-10 PROCEDURE — 360N000077 HC SURGERY LEVEL 4, PER MIN: Performed by: ORTHOPAEDIC SURGERY

## 2023-04-10 PROCEDURE — 710N000010 HC RECOVERY PHASE 1, LEVEL 2, PER MIN: Performed by: ORTHOPAEDIC SURGERY

## 2023-04-10 PROCEDURE — 250N000011 HC RX IP 250 OP 636: Performed by: NURSE ANESTHETIST, CERTIFIED REGISTERED

## 2023-04-10 PROCEDURE — 64415 NJX AA&/STRD BRCH PLXS IMG: CPT | Mod: RT | Performed by: NURSE ANESTHETIST, CERTIFIED REGISTERED

## 2023-04-10 PROCEDURE — 29826 SHO ARTHRS SRG DECOMPRESSION: CPT | Mod: RT | Performed by: ORTHOPAEDIC SURGERY

## 2023-04-10 PROCEDURE — 710N000012 HC RECOVERY PHASE 2, PER MINUTE: Performed by: ORTHOPAEDIC SURGERY

## 2023-04-10 PROCEDURE — 29828 SHO ARTHRS SRG BICP TENODSIS: CPT | Performed by: NURSE ANESTHETIST, CERTIFIED REGISTERED

## 2023-04-10 PROCEDURE — 370N000017 HC ANESTHESIA TECHNICAL FEE, PER MIN: Performed by: ORTHOPAEDIC SURGERY

## 2023-04-10 PROCEDURE — 258N000003 HC RX IP 258 OP 636: Performed by: NURSE ANESTHETIST, CERTIFIED REGISTERED

## 2023-04-10 PROCEDURE — 29828 SHO ARTHRS SRG BICP TENODSIS: CPT | Mod: RT | Performed by: ORTHOPAEDIC SURGERY

## 2023-04-10 PROCEDURE — 250N000009 HC RX 250: Performed by: NURSE ANESTHETIST, CERTIFIED REGISTERED

## 2023-04-10 PROCEDURE — 258N000001 HC RX 258: Performed by: ORTHOPAEDIC SURGERY

## 2023-04-10 PROCEDURE — 250N000011 HC RX IP 250 OP 636: Performed by: ORTHOPAEDIC SURGERY

## 2023-04-10 PROCEDURE — 272N000001 HC OR GENERAL SUPPLY STERILE: Performed by: ORTHOPAEDIC SURGERY

## 2023-04-10 PROCEDURE — 999N000141 HC STATISTIC PRE-PROCEDURE NURSING ASSESSMENT: Performed by: ORTHOPAEDIC SURGERY

## 2023-04-10 PROCEDURE — 250N000013 HC RX MED GY IP 250 OP 250 PS 637: Performed by: ORTHOPAEDIC SURGERY

## 2023-04-10 RX ORDER — HYDROCODONE BITARTRATE AND ACETAMINOPHEN 5; 325 MG/1; MG/1
1 TABLET ORAL
Status: COMPLETED | OUTPATIENT
Start: 2023-04-10 | End: 2023-04-10

## 2023-04-10 RX ORDER — ONDANSETRON 4 MG/1
4 TABLET, ORALLY DISINTEGRATING ORAL EVERY 30 MIN PRN
Status: DISCONTINUED | OUTPATIENT
Start: 2023-04-10 | End: 2023-04-10 | Stop reason: HOSPADM

## 2023-04-10 RX ORDER — CEFAZOLIN SODIUM/WATER 2 G/20 ML
2 SYRINGE (ML) INTRAVENOUS SEE ADMIN INSTRUCTIONS
Status: DISCONTINUED | OUTPATIENT
Start: 2023-04-10 | End: 2023-04-10 | Stop reason: HOSPADM

## 2023-04-10 RX ORDER — FENTANYL CITRATE 50 UG/ML
INJECTION, SOLUTION INTRAMUSCULAR; INTRAVENOUS PRN
Status: DISCONTINUED | OUTPATIENT
Start: 2023-04-10 | End: 2023-04-10

## 2023-04-10 RX ORDER — BUPIVACAINE HYDROCHLORIDE 5 MG/ML
INJECTION, SOLUTION EPIDURAL; INTRACAUDAL PRN
Status: DISCONTINUED | OUTPATIENT
Start: 2023-04-10 | End: 2023-04-10

## 2023-04-10 RX ORDER — IBUPROFEN 200 MG
600 TABLET ORAL
Status: DISCONTINUED | OUTPATIENT
Start: 2023-04-10 | End: 2023-04-10 | Stop reason: HOSPADM

## 2023-04-10 RX ORDER — FENTANYL CITRATE 50 UG/ML
50 INJECTION, SOLUTION INTRAMUSCULAR; INTRAVENOUS EVERY 5 MIN PRN
Status: DISCONTINUED | OUTPATIENT
Start: 2023-04-10 | End: 2023-04-10 | Stop reason: HOSPADM

## 2023-04-10 RX ORDER — IBUPROFEN 600 MG/1
600 TABLET, FILM COATED ORAL EVERY 6 HOURS PRN
Qty: 30 TABLET | Refills: 0 | Status: SHIPPED | OUTPATIENT
Start: 2023-04-10

## 2023-04-10 RX ORDER — OXYCODONE HYDROCHLORIDE 5 MG/1
10 TABLET ORAL
Status: DISCONTINUED | OUTPATIENT
Start: 2023-04-10 | End: 2023-04-10 | Stop reason: HOSPADM

## 2023-04-10 RX ORDER — HYDROMORPHONE HYDROCHLORIDE 1 MG/ML
0.4 INJECTION, SOLUTION INTRAMUSCULAR; INTRAVENOUS; SUBCUTANEOUS EVERY 5 MIN PRN
Status: DISCONTINUED | OUTPATIENT
Start: 2023-04-10 | End: 2023-04-10 | Stop reason: HOSPADM

## 2023-04-10 RX ORDER — DEXAMETHASONE SODIUM PHOSPHATE 4 MG/ML
INJECTION, SOLUTION INTRA-ARTICULAR; INTRALESIONAL; INTRAMUSCULAR; INTRAVENOUS; SOFT TISSUE PRN
Status: DISCONTINUED | OUTPATIENT
Start: 2023-04-10 | End: 2023-04-10

## 2023-04-10 RX ORDER — LIDOCAINE HYDROCHLORIDE 20 MG/ML
INJECTION, SOLUTION INFILTRATION; PERINEURAL PRN
Status: DISCONTINUED | OUTPATIENT
Start: 2023-04-10 | End: 2023-04-10

## 2023-04-10 RX ORDER — HYDROCODONE BITARTRATE AND ACETAMINOPHEN 5; 325 MG/1; MG/1
1-2 TABLET ORAL EVERY 4 HOURS PRN
Qty: 40 TABLET | Refills: 0 | Status: SHIPPED | OUTPATIENT
Start: 2023-04-10 | End: 2023-10-12

## 2023-04-10 RX ORDER — ONDANSETRON 2 MG/ML
4 INJECTION INTRAMUSCULAR; INTRAVENOUS EVERY 30 MIN PRN
Status: DISCONTINUED | OUTPATIENT
Start: 2023-04-10 | End: 2023-04-10 | Stop reason: HOSPADM

## 2023-04-10 RX ORDER — ONDANSETRON 2 MG/ML
INJECTION INTRAMUSCULAR; INTRAVENOUS PRN
Status: DISCONTINUED | OUTPATIENT
Start: 2023-04-10 | End: 2023-04-10

## 2023-04-10 RX ORDER — SODIUM CHLORIDE, SODIUM LACTATE, POTASSIUM CHLORIDE, CALCIUM CHLORIDE 600; 310; 30; 20 MG/100ML; MG/100ML; MG/100ML; MG/100ML
INJECTION, SOLUTION INTRAVENOUS CONTINUOUS
Status: DISCONTINUED | OUTPATIENT
Start: 2023-04-10 | End: 2023-04-10 | Stop reason: HOSPADM

## 2023-04-10 RX ORDER — NALOXONE HYDROCHLORIDE 0.4 MG/ML
0.2 INJECTION, SOLUTION INTRAMUSCULAR; INTRAVENOUS; SUBCUTANEOUS
Status: DISCONTINUED | OUTPATIENT
Start: 2023-04-10 | End: 2023-04-10 | Stop reason: HOSPADM

## 2023-04-10 RX ORDER — NALOXONE HYDROCHLORIDE 0.4 MG/ML
0.4 INJECTION, SOLUTION INTRAMUSCULAR; INTRAVENOUS; SUBCUTANEOUS
Status: DISCONTINUED | OUTPATIENT
Start: 2023-04-10 | End: 2023-04-10 | Stop reason: HOSPADM

## 2023-04-10 RX ORDER — CEFAZOLIN SODIUM/WATER 2 G/20 ML
2 SYRINGE (ML) INTRAVENOUS
Status: DISCONTINUED | OUTPATIENT
Start: 2023-04-10 | End: 2023-04-10 | Stop reason: HOSPADM

## 2023-04-10 RX ORDER — KETOROLAC TROMETHAMINE 30 MG/ML
INJECTION, SOLUTION INTRAMUSCULAR; INTRAVENOUS PRN
Status: DISCONTINUED | OUTPATIENT
Start: 2023-04-10 | End: 2023-04-10

## 2023-04-10 RX ORDER — KETAMINE HYDROCHLORIDE 10 MG/ML
INJECTION INTRAMUSCULAR; INTRAVENOUS PRN
Status: DISCONTINUED | OUTPATIENT
Start: 2023-04-10 | End: 2023-04-10

## 2023-04-10 RX ORDER — LIDOCAINE 40 MG/G
CREAM TOPICAL
Status: DISCONTINUED | OUTPATIENT
Start: 2023-04-10 | End: 2023-04-10 | Stop reason: HOSPADM

## 2023-04-10 RX ORDER — HYDROMORPHONE HYDROCHLORIDE 1 MG/ML
0.2 INJECTION, SOLUTION INTRAMUSCULAR; INTRAVENOUS; SUBCUTANEOUS EVERY 5 MIN PRN
Status: DISCONTINUED | OUTPATIENT
Start: 2023-04-10 | End: 2023-04-10 | Stop reason: HOSPADM

## 2023-04-10 RX ORDER — DEXAMETHASONE SODIUM PHOSPHATE 10 MG/ML
INJECTION, SOLUTION INTRAMUSCULAR; INTRAVENOUS PRN
Status: DISCONTINUED | OUTPATIENT
Start: 2023-04-10 | End: 2023-04-10

## 2023-04-10 RX ORDER — FENTANYL CITRATE 50 UG/ML
25 INJECTION, SOLUTION INTRAMUSCULAR; INTRAVENOUS EVERY 5 MIN PRN
Status: DISCONTINUED | OUTPATIENT
Start: 2023-04-10 | End: 2023-04-10 | Stop reason: HOSPADM

## 2023-04-10 RX ORDER — OXYCODONE HYDROCHLORIDE 5 MG/1
5 TABLET ORAL
Status: DISCONTINUED | OUTPATIENT
Start: 2023-04-10 | End: 2023-04-10 | Stop reason: HOSPADM

## 2023-04-10 RX ORDER — PROPOFOL 10 MG/ML
INJECTION, EMULSION INTRAVENOUS PRN
Status: DISCONTINUED | OUTPATIENT
Start: 2023-04-10 | End: 2023-04-10

## 2023-04-10 RX ORDER — PROPOFOL 10 MG/ML
INJECTION, EMULSION INTRAVENOUS CONTINUOUS PRN
Status: DISCONTINUED | OUTPATIENT
Start: 2023-04-10 | End: 2023-04-10

## 2023-04-10 RX ORDER — EPHEDRINE SULFATE 50 MG/ML
INJECTION, SOLUTION INTRAMUSCULAR; INTRAVENOUS; SUBCUTANEOUS PRN
Status: DISCONTINUED | OUTPATIENT
Start: 2023-04-10 | End: 2023-04-10

## 2023-04-10 RX ADMIN — PROPOFOL 30 MG: 10 INJECTION, EMULSION INTRAVENOUS at 11:08

## 2023-04-10 RX ADMIN — FENTANYL CITRATE 25 MCG: 50 INJECTION, SOLUTION INTRAMUSCULAR; INTRAVENOUS at 11:04

## 2023-04-10 RX ADMIN — Medication 10 MG: at 11:07

## 2023-04-10 RX ADMIN — FENTANYL CITRATE 25 MCG: 50 INJECTION, SOLUTION INTRAMUSCULAR; INTRAVENOUS at 11:20

## 2023-04-10 RX ADMIN — HYDROCODONE BITARTRATE AND ACETAMINOPHEN 1 TABLET: 5; 325 TABLET ORAL at 12:45

## 2023-04-10 RX ADMIN — Medication 2 G: at 09:49

## 2023-04-10 RX ADMIN — PHENYLEPHRINE HYDROCHLORIDE 100 MCG: 10 INJECTION INTRAVENOUS at 10:30

## 2023-04-10 RX ADMIN — MIDAZOLAM 2 MG: 1 INJECTION INTRAMUSCULAR; INTRAVENOUS at 09:20

## 2023-04-10 RX ADMIN — Medication 5 MG: at 10:39

## 2023-04-10 RX ADMIN — BUPIVACAINE HYDROCHLORIDE 20 ML: 5 INJECTION, SOLUTION EPIDURAL; INTRACAUDAL; PERINEURAL at 09:29

## 2023-04-10 RX ADMIN — Medication 5 MG: at 10:36

## 2023-04-10 RX ADMIN — PHENYLEPHRINE HYDROCHLORIDE 100 MCG: 10 INJECTION INTRAVENOUS at 10:21

## 2023-04-10 RX ADMIN — LIDOCAINE HYDROCHLORIDE 40 MG: 20 INJECTION, SOLUTION INFILTRATION; PERINEURAL at 10:07

## 2023-04-10 RX ADMIN — DEXAMETHASONE SODIUM PHOSPHATE 5 MG: 10 INJECTION, SOLUTION INTRAMUSCULAR; INTRAVENOUS at 09:29

## 2023-04-10 RX ADMIN — KETOROLAC TROMETHAMINE 30 MG: 30 INJECTION, SOLUTION INTRAMUSCULAR at 11:09

## 2023-04-10 RX ADMIN — PROPOFOL 150 MG: 10 INJECTION, EMULSION INTRAVENOUS at 10:07

## 2023-04-10 RX ADMIN — DEXAMETHASONE SODIUM PHOSPHATE 4 MG: 4 INJECTION, SOLUTION INTRA-ARTICULAR; INTRALESIONAL; INTRAMUSCULAR; INTRAVENOUS; SOFT TISSUE at 10:27

## 2023-04-10 RX ADMIN — ONDANSETRON HYDROCHLORIDE 4 MG: 2 SOLUTION INTRAMUSCULAR; INTRAVENOUS at 10:27

## 2023-04-10 RX ADMIN — PROPOFOL 225 MCG/KG/MIN: 10 INJECTION, EMULSION INTRAVENOUS at 10:08

## 2023-04-10 RX ADMIN — SODIUM CHLORIDE, POTASSIUM CHLORIDE, SODIUM LACTATE AND CALCIUM CHLORIDE 100 ML/HR: 600; 310; 30; 20 INJECTION, SOLUTION INTRAVENOUS at 09:03

## 2023-04-10 ASSESSMENT — ACTIVITIES OF DAILY LIVING (ADL)
ADLS_ACUITY_SCORE: 35

## 2023-04-10 NOTE — DISCHARGE INSTRUCTIONS
[facility]  Adult Discharge Orders & Instructions      For 24 hours after surgery:  Get plenty of rest.  A responsible adult must stay with you for at least 24 hours after you leave the hospital.   You may feel lightheaded.  IF so, sit for a few minutes before standing.  Have someone help you get up.   You may have a slight fever. Call the doctor if your fever is over 101 F (38.3 C) (taken under the tongue) or lasts longer than 24 hours.  You may have a dry mouth, a sore throat, muscle aches or trouble sleeping.  These should go away after 24 hours.  Do not make important or legal decisions.  6.   Do not drive or use heavy equipment.  If you have weakness or tingling, don't drive or use heavy equipment until this feeling goes away.                                                                                                                                                                         To contact a doctor, call    [phone]______________    Surgeon Contact Information  Orthopedic [facility] Therapy/Occupational Therapy Order faxed to patients preferred therapist *** and copy sent home with patient for reference.  If you have questions or concerns related to your procedure please contact your surgeon through [facility] at [phone].

## 2023-04-10 NOTE — OR NURSING
PACU Respiratory Event Documentation     1) Episodes of Apnea greater than or equal to 10 seconds: no    2) Bradypnea - less than 8 breaths per minute:no    3) Pain score on 0 to 10 scale: denies    4) Pain-sedation mismatch (yes or no): no    5) Repeated 02 desaturation less than 90% (yes or no): no    Anesthesia notified? (yes or no): no    Any of the above events occuring repeatedly in separate 30 minute intervals may be considered recurrent PACU respiratory events.    Betzy Read RN

## 2023-04-10 NOTE — OP NOTE
Procedure Date: 04/10/2023    PREOPERATIVE DIAGNOSES:   1.  Right shoulder rotator cuff tendinitis.  2.  AC joint arthritis.  3.  Type 2 SLAP tear.    POSTOPERATIVE DIAGNOSES:   1.  Right shoulder rotator cuff tendinitis.  2.  AC joint arthritis.  3.  Type 2 SLAP tear.    PROCEDURE PERFORMED:  Right shoulder arthroscopy with subacromial decompression, distal clavicle excision and a biceps tenodesis.    SURGEON:  Ye Russo MD    ASSISTANT:  Jazmin Salcido PA-C    ANESTHESIA:  General.    INDICATIONS FOR PROCEDURE:  Kacy Valdez is a 53-year-old female with right shoulder pain.  She had an MRI that showed that she had rotator cuff tendinitis, as well as a possible type 2 SLAP tear, as well as acromioclavicular arthritis.  She can no longer tolerate her shoulder in this fashion and wished to have this definitively treated.  All of the risks and benefits of surgical intervention were discussed with her, and she wished to proceed.    DESCRIPTION OF PROCEDURE:  The patient was taken to the operating room.  After adequate induction of anesthesia, she was positioned, prepped and draped in the usual sterile fashion on the operative table.  A universal protocol timeout was initiated.  Once all in the room were in agreement, an incision was made in the posterior shoulder, at the posterior portal.  Scope, trocar and cannula were advanced in the glenohumeral joint.  Trocar was swapped for the camera.  The glenoid cartilage showed some grade 1-2 wear.  The humeral cartilage showed some grade 1-2 wear.  The superior glenoid labrum was obviously torn and was quite destabilized.  An anterior portal was created via needle localization and this was probed and found to have a type 2 SLAP tear that extended from approximately the 3 o'clock position and the 9 o'clock position.  The biceps appeared to be thickened and tendinotic as well.  The rotator cuff was then inspected and found to be intact on the articular surface.  A biceps  tenodesis was undertaken utilizing my standard technique and the biceps was released off the superior glenoid labrum.  The ends of the suture were tagged outside the shoulder for later tying in the subacromial space.  The camera was then removed from the glenohumeral joint and placed in the subacromial space.  Once in the subacromial space, an anterolateral portal was created via created via needle localization and a complete bursectomy was performed in the subacromial space.  Attention was turned to the biceps tenodesis.  Sutures were then brought out the anterior portal and subsequently tied over the rotator interval with the arm in external rotation to prevent internal rotation contracture.  Attention was turned to the undersurface of the acromion.  The coracoacromial ligament was released off the undersurface of the acromion. This exposed a large broad base and desufflated off the anterior acromion, which was subsequently burred down until flat with a 4 mm barrel bur.  Adequate resection was documented with the camera.  Camera was then removed from the posterior portal, placed in anterolateral portal, freeing the AC joint, which was denuded of soft tissue both inferiorly and anteriorly utilizing the radiofrequency ablator.  The 4 mm barrel bur was then used to bur down the distal clavicle approximately 5 mm.  Adequate resection was documented with the camera.  This concluded the arthroscopy.  4-0 nylon was used in a buried figure-of-eight fashion to close the skin of all 3 portals and the patient was taken in stable condition to postanesthesia care unit.  She will be in a standard shoulder protocol with biceps precautions.    Ye Russo MD        D: 04/10/2023   T: 04/10/2023   MT: SOL    Name:     CARLTON PEPE  MRN:      [id number]        Account:        [id number]   :      1970           Procedure Date: 04/10/2023     Document: [id number]

## 2023-04-10 NOTE — ANESTHESIA PROCEDURE NOTES
"Brachial plexus Procedure Note    Pre-Procedure   Staff -        CRNA: Chance Lopez APRN CRNA       Performed By: CRNA       Location: OR       Procedure Start/Stop Times: 4/10/2023 9:20 AM and 4/10/2023 9:30 AM       Pre-Anesthestic Checklist: patient identified, IV checked, site marked, risks and benefits discussed, informed consent, monitors and equipment checked, pre-op evaluation, at physician/surgeon's request and post-op pain management  Timeout:       Correct Patient: Yes        Correct Procedure: Yes        Correct Site: Yes        Correct Position: Yes        Correct Laterality: Yes        Site Marked: Yes  Procedure Documentation  Procedure: Brachial plexus       Diagnosis: POST-OP PAIN CONTROL       Laterality: right       Patient Position: supine       Patient Prep/Sterile Barriers: sterile gloves, mask       Skin prep: Chloraprep (interscalene approach).       Needle Type: insulated       Needle Gauge: 22.        Needle Length (Inches): 3.13        Ultrasound guided       1. Ultrasound was used to identify targeted nerve, plexus, vascular marker, or fascial plane and place a needle adjacent to it in real-time.       2. Ultrasound was used to visualize the spread of anesthetic in close proximity to the above referenced structure.       3. A permanent image is entered into the patient's record.       4. The visualized anatomic structures appeared normal.       5. There were no apparent abnormal pathologic findings.    Assessment/Narrative         The placement was negative for: blood aspirated, painful injection and site bleeding       Paresthesias: No.       Bolus given via needle..        Secured via.        Insertion/Infusion Method: Single Shot       Complications: none    Medication(s) Administered   Medication Administration Time: 4/10/2023 9:20 AM      FOR [facility] ([facility]) ONLY:   Pain Team Contact information: please page the Pain Team Via [employer]. Search \"Pain\". During daytime hours, " please page the attending first. At night please page the resident first.

## 2023-04-10 NOTE — ANESTHESIA POSTPROCEDURE EVALUATION
Patient: Kacy Valdez    Procedure: Procedure(s):  Arthroscopy Shoulder Rotator Cuff Evaulation, Subacromial  Decompression, Distal Clavcicle Excision, & Bicep Tenodesis Repair       Anesthesia Type:  General    Note:  Disposition: Outpatient   Postop Pain Control: Uneventful            Sign Out: Well controlled pain   PONV: No   Neuro/Psych: Uneventful            Sign Out: Acceptable/Baseline neuro status   Airway/Respiratory: Uneventful            Sign Out: Acceptable/Baseline resp. status   CV/Hemodynamics: Uneventful            Sign Out: Acceptable CV status; No obvious hypovolemia; No obvious fluid overload   Other NRE: NONE   DID A NON-ROUTINE EVENT OCCUR? No           Last vitals:  Vitals Value Taken Time   /66 04/10/23 1145   Temp 97.8  F (36.6  C) 04/10/23 1140   Pulse 73 04/10/23 1145   Resp 7 04/10/23 1146   SpO2 95 % 04/10/23 1146   Vitals shown include unvalidated device data.    Electronically Signed By: DANIEL Carvalho CRNA  April 10, 2023  1:00 PM

## 2023-04-10 NOTE — ANESTHESIA CARE TRANSFER NOTE
Patient: Kacy Valedz    Procedure: Procedure(s):  Arthroscopy Shoulder Rotator Cuff Evaulation, Subacromial  Decompression, Distal Clavcicle Excision, & Bicep Tenodesis Repair       Diagnosis: Shoulder impingement, right [M25.811]  Diagnosis Additional Information: No value filed.    Anesthesia Type:   General     Note:    Oropharynx: oropharynx clear of all foreign objects and spontaneously breathing  Level of Consciousness: drowsy  Oxygen Supplementation: face mask  Level of Supplemental Oxygen (L/min / FiO2): 6  Independent Airway: airway patency satisfactory and stable  Dentition: dentition unchanged  Vital Signs Stable: post-procedure vital signs reviewed and stable  Report to RN Given: handoff report given  Patient transferred to: PACU    Handoff Report: Identifed the Patient, Identified the Reponsible Provider, Reviewed the pertinent medical history, Discussed the surgical course, Reviewed Intra-OP anesthesia mangement and issues during anesthesia, Set expectations for post-procedure period and Allowed opportunity for questions and acknowledgement of understanding      Vitals:  Vitals Value Taken Time   BP     Temp     Pulse     Resp     SpO2         Electronically Signed By: DANIEL MALONEY CRNA  April 10, 2023  11:31 AM

## 2023-04-10 NOTE — OR NURSING
Kacy has been discharged to home at 1324 via wheelchair accompanied by BREANNA Danielle.    Written discharge instructions were provided to Kacy and .  Prescriptions were escribed.  Patient states their pain is 2/10, and denies any nausea or dizziness upon discharge.    Patient and adult caring for them verbalize understanding of discharge instructions including no driving until tomorrow and no longer taking narcotic pain medications - no operating mechanical equipment and no making any important decisions.They understand reason for discharge, and necessary follow-up appointments.

## 2023-04-10 NOTE — BRIEF OP NOTE
[facility]    Brief Operative Note    Pre-operative diagnosis: Shoulder impingement, right [M25.811]  Post-operative diagnosis Same as pre-operative diagnosis    Procedure: Procedure(s):  Arthroscopy Shoulder Rotator Cuff Evaulation, Subacromial  Decompression, Distal Clavcicle Excision, & Bicep Tenodesis Repair  Surgeon: Surgeon(s) and Role:     * Ye Russo MD - Primary     * Jazmin Salcido PA-C - Assisting  Anesthesia: General   Estimated Blood Loss: Minimal    Drains: None  Specimens: * No specimens in log *  Findings:   None.  Complications: None.  Implants: * No implants in log *

## 2023-04-10 NOTE — ANESTHESIA PREPROCEDURE EVALUATION
Anesthesia Pre-Procedure Evaluation    Patient: Kacy Valdez   MRN: [id number] : 1970        Procedure : Procedure(s):  Arthroscopy Shoulder Rotator Cuff Repair, Subacromial  Decompression, Distal Clavcicle Excision, & Possible Bicep Tenodesis Repair          Past Medical History:   Diagnosis Date     Carcinoma in situ of cervix uteri 2009     Dysthymic disorder 2001     FH: breast cancer in first degree relative when <50 years old     sister Age 41// BRCA negative     Other acne 01/10/2002     Panic disorder without agoraphobia 2011     Papanicolaou smear of cervix with high grade squamous intraepithelial lesion (HGSIL) 2009     Unspecified sinusitis (chronic) 2003      Past Surgical History:   Procedure Laterality Date     ------------OTHER-------------      LEEP     ------------OTHER-------------      IUD     BIOPSY BREAST      breast lump     BUNIONECTOMY       COLONOSCOPY N/A 10/4/2018    Repeat in Procedure: COLONOSCOPY;  COLONSCOPY with Polypectomy;  Surgeon: Jese Rizzo MD;  Location: [facility]     IR CONSULTATION FOR IR EXAM  1/10/2020     IR TRIGGER POINT INJ 1 OR 2 MUSCLES  2020     IR TRIGGER POINT INJ 1 OR 2 MUSCLES  2020     IR TRIGGER POINT INJ 1 OR 2 MUSCLES  2021     IR TRIGGER POINT INJ 1 OR 2 MUSCLES  2021      Allergies   Allergen Reactions     Nuts Other (See Comments)     Nut flavor - throat tightens      Social History     Tobacco Use     Smoking status: Never     Smokeless tobacco: Never   Vaping Use     Vaping status: Never Used   Substance Use Topics     Alcohol use: No      Wt Readings from Last 1 Encounters:   04/10/23 52.2 kg (115 lb)        Anesthesia Evaluation   Pt has had prior anesthetic.     No history of anesthetic complications       ROS/MED HX  ENT/Pulmonary:  - neg pulmonary ROS     Neurologic:  - neg neurologic ROS     Cardiovascular:       METS/Exercise Tolerance: >4 METS    Hematologic:  - neg  hematologic  ROS     Musculoskeletal: Comment: Right shoulder pain  (+) arthritis,     GI/Hepatic:  - neg GI/hepatic ROS     Renal/Genitourinary:  - neg Renal ROS     Endo:  - neg endo ROS     Psychiatric/Substance Use:     (+) psychiatric history anxiety and depression     Infectious Disease:  - neg infectious disease ROS     Malignancy:   (+) Malignancy, History of Other.Other CA cervical Remission status post.    Other:  - neg other ROS          Physical Exam    Airway        Mallampati: II   TM distance: > 3 FB   Neck ROM: full   Mouth opening: > 3 cm    Respiratory Devices and Support         Dental    unable to assess    (+) Completely normal teeth      Cardiovascular   cardiovascular exam normal       Rhythm and rate: regular and normal     Pulmonary   pulmonary exam normal        breath sounds clear to auscultation           OUTSIDE LABS:  CBC:   Lab Results   Component Value Date    WBC 4.3 03/30/2023    WBC 5.1 05/25/2022    HGB 15.3 03/30/2023    HGB 14.8 05/25/2022    HCT 43.7 03/30/2023    HCT 44.3 05/25/2022     03/30/2023     05/25/2022     BMP:   Lab Results   Component Value Date     03/30/2023     05/25/2022    POTASSIUM 4.1 03/30/2023    POTASSIUM 3.9 05/25/2022    CHLORIDE 104 03/30/2023    CHLORIDE 110 (H) 05/25/2022    CO2 27 03/30/2023    CO2 27 05/25/2022    BUN 16.4 03/30/2023    BUN 16 05/25/2022    CR 0.78 03/30/2023    CR 0.86 05/25/2022     (H) 03/30/2023     (H) 05/25/2022     COAGS: No results found for: PTT, INR, FIBR  POC:   Lab Results   Component Value Date    HCG Negative 10/04/2018     HEPATIC:   Lab Results   Component Value Date    ALBUMIN 4.3 01/09/2020    PROTTOTAL 7.7 01/09/2020    ALT 21 01/09/2020    AST 16 01/09/2020    ALKPHOS 94 01/09/2020    BILITOTAL 0.4 01/09/2020     OTHER:   Lab Results   Component Value Date    A1C 4.9 06/05/2015    LOREN 9.9 03/30/2023    MAG 2.1 08/28/2015    LIPASE 126 08/28/2015    TSH 2.62 01/09/2020     CRP <2.9 08/28/2015    SED 5 08/28/2015       Anesthesia Plan    ASA Status:  2   NPO Status:  NPO Appropriate    Anesthesia Type: General.     - Airway: LMA   Induction: Intravenous, Propofol.   Maintenance: Balanced.        Consents    Anesthesia Plan(s) and associated risks, benefits, and realistic alternatives discussed. Questions answered and patient/representative(s) expressed understanding.     - Discussed: Risks, Benefits and Alternatives for BOTH SEDATION and the PROCEDURE were discussed     - Discussed with:  Patient      - Extended Intubation/Ventilatory Support Discussed: No.      - Patient is DNR/DNI Status: No    Use of blood products discussed: No .     Postoperative Care    Pain management: Peripheral nerve block (Single Shot).   PONV prophylaxis: Ondansetron (or other 5HT-3), Dexamethasone or Solumedrol     Comments:                DANIEL Carvalho CRNA

## 2023-04-10 NOTE — ANESTHESIA PROCEDURE NOTES
Airway       Patient location during procedure: OR       Procedure Start/Stop Times: 4/10/2023 10:09 AM and 4/10/2023 10:09 AM  Staff -        CRNA: Chance Lopez APRN CRNA       Performed By: CRNAIndications and Patient Condition       Indications for airway management: michael-procedural       Induction type:intravenous       Mask difficulty assessment: 1 - vent by mask    Final Airway Details       Final airway type: supraglottic airway    Supraglottic Airway Details        Type: LMA       Brand: I-Gel       LMA size: 3    Post intubation assessment        Placement verified by: capnometry, equal breath sounds and chest rise        Number of attempts at approach: 1       Secured with: silk tape       Ease of procedure: easy       Dentition: Intact and Unchanged    Medication(s) Administered   Medication Administration Time: 4/10/2023 10:09 AM

## 2023-04-17 ENCOUNTER — OFFICE VISIT (OUTPATIENT)
Dept: ORTHOPEDICS | Facility: OTHER | Age: 53
End: 2023-04-17
Attending: ORTHOPAEDIC SURGERY
Payer: COMMERCIAL

## 2023-04-17 DIAGNOSIS — M25.511 RIGHT SHOULDER PAIN, UNSPECIFIED CHRONICITY: Primary | ICD-10-CM

## 2023-04-17 PROCEDURE — 99024 POSTOP FOLLOW-UP VISIT: CPT | Performed by: ORTHOPAEDIC SURGERY

## 2023-04-17 NOTE — PROGRESS NOTES
Visit Date: 2023    HISTORY OF PRESENT ILLNESS:  She is one week status post right shoulder arthroscopy with subacromial decompression, distal clavicle excision and biceps tenodesis.  She is doing really well at this point.  No complaints with her shoulder other than she is having significant amount of pain.  Starts physical therapy this coming week.  Her sutures were removed today.  Steri-Strips were applied.  Her shoulder is completely benign.  She is neuro and vascularly intact.  Did not put through a range of motion.  Biceps contour is normal.     PLAN:  We are going to see her back in approximately 4-5 weeks.  She is going to start physical therapy and continue that in the interim.    Ye Russo MD        D: 2023   T: 2023   MT: BETTY    Name:     CRALTON PEPE  MRN:      [id number]        Account:    [id number]   :      1970           Visit Date: 2023     Document: [id number]

## 2023-04-17 NOTE — PROGRESS NOTES
Patient is here for follow up on her right shoulder scope.  Roxana Renteria LPN .....................4/17/2023 11:06 AM

## 2023-05-01 ENCOUNTER — MYC MEDICAL ADVICE (OUTPATIENT)
Dept: FAMILY MEDICINE | Facility: OTHER | Age: 53
End: 2023-05-01

## 2023-05-01 DIAGNOSIS — F41.0 PANIC DISORDER WITHOUT AGORAPHOBIA: ICD-10-CM

## 2023-05-01 RX ORDER — PAROXETINE 10 MG/1
10 TABLET, FILM COATED ORAL DAILY
Qty: 90 TABLET | Refills: 3 | Status: SHIPPED | OUTPATIENT
Start: 2023-05-01 | End: 2024-04-22

## 2023-05-01 NOTE — TELEPHONE ENCOUNTER
paxil      Last Written Prescription Date:  4/27/2022  Last Fill Quantity: 90,   # refills: 3  Last Office Visit: 3/30/2023  Future Office visit:       Routing refill request to provider for review/approval because:

## 2023-05-10 ENCOUNTER — TRANSFERRED RECORDS (OUTPATIENT)
Dept: HEALTH INFORMATION MANAGEMENT | Facility: CLINIC | Age: 53
End: 2023-05-10

## 2023-06-06 ENCOUNTER — TELEPHONE (OUTPATIENT)
Dept: MAMMOGRAPHY | Facility: OTHER | Age: 53
End: 2023-06-06

## 2023-06-06 ENCOUNTER — ANCILLARY PROCEDURE (OUTPATIENT)
Dept: MAMMOGRAPHY | Facility: OTHER | Age: 53
End: 2023-06-06
Attending: FAMILY MEDICINE
Payer: COMMERCIAL

## 2023-06-06 DIAGNOSIS — Z12.39 BREAST CANCER SCREENING, HIGH RISK PATIENT: ICD-10-CM

## 2023-06-06 DIAGNOSIS — Z80.3 FAMILY HISTORY OF BREAST CANCER IN SISTER: ICD-10-CM

## 2023-06-06 PROCEDURE — 77067 SCR MAMMO BI INCL CAD: CPT | Mod: TC | Performed by: RADIOLOGY

## 2023-06-06 PROCEDURE — 77063 BREAST TOMOSYNTHESIS BI: CPT | Mod: TC | Performed by: RADIOLOGY

## 2023-06-13 ENCOUNTER — TELEPHONE (OUTPATIENT)
Dept: FAMILY MEDICINE | Facility: OTHER | Age: 53
End: 2023-06-13

## 2023-06-13 NOTE — TELEPHONE ENCOUNTER
If provider approves of this referral, please have one placed in the chart. We will need to know place of service, dates of service and diagnosis. Thank you.

## 2023-06-13 NOTE — TELEPHONE ENCOUNTER
Patient sent [facility] requesting an Insurance Referral for Sugery & f/u Jackie't  Returned [facility] message that request will be pended to Ins. Referral specialist

## 2023-06-27 ENCOUNTER — MYC MEDICAL ADVICE (OUTPATIENT)
Dept: FAMILY MEDICINE | Facility: OTHER | Age: 53
End: 2023-06-27

## 2023-06-28 ENCOUNTER — TRANSFERRED RECORDS (OUTPATIENT)
Dept: HEALTH INFORMATION MANAGEMENT | Facility: CLINIC | Age: 53
End: 2023-06-28
Payer: COMMERCIAL

## 2023-06-28 NOTE — TELEPHONE ENCOUNTER
The referral for [facility] was done for the 04/10/23 surgery. The auth# is [id number].

## 2023-07-26 ENCOUNTER — TRANSFERRED RECORDS (OUTPATIENT)
Dept: HEALTH INFORMATION MANAGEMENT | Facility: CLINIC | Age: 53
End: 2023-07-26
Payer: COMMERCIAL

## 2023-08-23 ENCOUNTER — TRANSFERRED RECORDS (OUTPATIENT)
Dept: HEALTH INFORMATION MANAGEMENT | Facility: HOSPITAL | Age: 53
End: 2023-08-23

## 2023-08-27 ENCOUNTER — HEALTH MAINTENANCE LETTER (OUTPATIENT)
Age: 53
End: 2023-08-27

## 2023-09-27 ENCOUNTER — TRANSFERRED RECORDS (OUTPATIENT)
Dept: HEALTH INFORMATION MANAGEMENT | Facility: CLINIC | Age: 53
End: 2023-09-27
Payer: COMMERCIAL

## 2023-10-12 ENCOUNTER — LAB (OUTPATIENT)
Dept: LAB | Facility: OTHER | Age: 53
End: 2023-10-12
Payer: COMMERCIAL

## 2023-10-12 ENCOUNTER — OFFICE VISIT (OUTPATIENT)
Dept: FAMILY MEDICINE | Facility: OTHER | Age: 53
End: 2023-10-12
Attending: FAMILY MEDICINE
Payer: COMMERCIAL

## 2023-10-12 VITALS
BODY MASS INDEX: 20.25 KG/M2 | HEIGHT: 63 IN | OXYGEN SATURATION: 99 % | WEIGHT: 114.3 LBS | TEMPERATURE: 98.3 F | DIASTOLIC BLOOD PRESSURE: 70 MMHG | SYSTOLIC BLOOD PRESSURE: 112 MMHG | HEART RATE: 64 BPM

## 2023-10-12 DIAGNOSIS — Z78.0 MENOPAUSE: ICD-10-CM

## 2023-10-12 DIAGNOSIS — Z12.39 BREAST CANCER SCREENING, HIGH RISK PATIENT: ICD-10-CM

## 2023-10-12 DIAGNOSIS — Z23 ENCOUNTER FOR IMMUNIZATION: ICD-10-CM

## 2023-10-12 DIAGNOSIS — M79.672 BILATERAL FOOT PAIN: ICD-10-CM

## 2023-10-12 DIAGNOSIS — Z80.3 FAMILY HISTORY OF BREAST CANCER IN SISTER: ICD-10-CM

## 2023-10-12 DIAGNOSIS — Z00.00 WELL WOMAN EXAM WITHOUT GYNECOLOGICAL EXAM: Primary | ICD-10-CM

## 2023-10-12 DIAGNOSIS — F41.8 DEPRESSION WITH ANXIETY: ICD-10-CM

## 2023-10-12 DIAGNOSIS — Z30.432 ENCOUNTER FOR IUD REMOVAL: ICD-10-CM

## 2023-10-12 DIAGNOSIS — M79.671 BILATERAL FOOT PAIN: ICD-10-CM

## 2023-10-12 DIAGNOSIS — B97.7 HIGH RISK HPV INFECTION: ICD-10-CM

## 2023-10-12 DIAGNOSIS — M85.89 OSTEOPENIA OF MULTIPLE SITES: ICD-10-CM

## 2023-10-12 DIAGNOSIS — Z00.00 WELL WOMAN EXAM WITHOUT GYNECOLOGICAL EXAM: ICD-10-CM

## 2023-10-12 LAB
ALBUMIN SERPL BCG-MCNC: 4.8 G/DL (ref 3.5–5.2)
ALP SERPL-CCNC: 73 U/L (ref 35–104)
ALT SERPL W P-5'-P-CCNC: 11 U/L (ref 0–50)
ANION GAP SERPL CALCULATED.3IONS-SCNC: 8 MMOL/L (ref 7–15)
AST SERPL W P-5'-P-CCNC: 23 U/L (ref 0–45)
BASO+EOS+MONOS # BLD AUTO: ABNORMAL 10*3/UL
BASO+EOS+MONOS NFR BLD AUTO: ABNORMAL %
BASOPHILS # BLD AUTO: 0 10E3/UL (ref 0–0.2)
BASOPHILS NFR BLD AUTO: 1 %
BILIRUB SERPL-MCNC: 0.2 MG/DL
BUN SERPL-MCNC: 10.5 MG/DL (ref 6–20)
CALCIUM SERPL-MCNC: 9.7 MG/DL (ref 8.6–10)
CHLORIDE SERPL-SCNC: 106 MMOL/L (ref 98–107)
CREAT SERPL-MCNC: 0.75 MG/DL (ref 0.51–0.95)
DEPRECATED HCO3 PLAS-SCNC: 28 MMOL/L (ref 22–29)
EGFRCR SERPLBLD CKD-EPI 2021: >90 ML/MIN/1.73M2
EOSINOPHIL # BLD AUTO: 0.1 10E3/UL (ref 0–0.7)
EOSINOPHIL NFR BLD AUTO: 2 %
ERYTHROCYTE [DISTWIDTH] IN BLOOD BY AUTOMATED COUNT: 12.3 % (ref 10–15)
GLUCOSE SERPL-MCNC: 90 MG/DL (ref 70–99)
HCT VFR BLD AUTO: 43.4 % (ref 35–47)
HGB BLD-MCNC: 14.3 G/DL (ref 11.7–15.7)
IMM GRANULOCYTES # BLD: 0 10E3/UL
IMM GRANULOCYTES NFR BLD: 0 %
LYMPHOCYTES # BLD AUTO: 1.3 10E3/UL (ref 0.8–5.3)
LYMPHOCYTES NFR BLD AUTO: 34 %
MCH RBC QN AUTO: 30.6 PG (ref 26.5–33)
MCHC RBC AUTO-ENTMCNC: 32.9 G/DL (ref 31.5–36.5)
MCV RBC AUTO: 93 FL (ref 78–100)
MONOCYTES # BLD AUTO: 0.4 10E3/UL (ref 0–1.3)
MONOCYTES NFR BLD AUTO: 11 %
NEUTROPHILS # BLD AUTO: 2 10E3/UL (ref 1.6–8.3)
NEUTROPHILS NFR BLD AUTO: 52 %
NRBC # BLD AUTO: 0 10E3/UL
NRBC BLD AUTO-RTO: 0 /100
PLATELET # BLD AUTO: 255 10E3/UL (ref 150–450)
POTASSIUM SERPL-SCNC: 4.3 MMOL/L (ref 3.4–5.3)
PROT SERPL-MCNC: 7.5 G/DL (ref 6.4–8.3)
RBC # BLD AUTO: 4.67 10E6/UL (ref 3.8–5.2)
SODIUM SERPL-SCNC: 142 MMOL/L (ref 135–145)
TSH SERPL DL<=0.005 MIU/L-ACNC: 3.18 UIU/ML (ref 0.3–4.2)
WBC # BLD AUTO: 3.8 10E3/UL (ref 4–11)

## 2023-10-12 PROCEDURE — 80050 GENERAL HEALTH PANEL: CPT

## 2023-10-12 PROCEDURE — 99213 OFFICE O/P EST LOW 20 MIN: CPT | Mod: 25 | Performed by: FAMILY MEDICINE

## 2023-10-12 PROCEDURE — 36415 COLL VENOUS BLD VENIPUNCTURE: CPT

## 2023-10-12 PROCEDURE — 58301 REMOVE INTRAUTERINE DEVICE: CPT | Performed by: FAMILY MEDICINE

## 2023-10-12 PROCEDURE — 99396 PREV VISIT EST AGE 40-64: CPT | Mod: 25 | Performed by: FAMILY MEDICINE

## 2023-10-12 PROCEDURE — 90471 IMMUNIZATION ADMIN: CPT | Performed by: FAMILY MEDICINE

## 2023-10-12 PROCEDURE — 90715 TDAP VACCINE 7 YRS/> IM: CPT | Performed by: FAMILY MEDICINE

## 2023-10-12 ASSESSMENT — ENCOUNTER SYMPTOMS
HEARTBURN: 0
DYSURIA: 0
HEMATURIA: 0
FEVER: 0
SORE THROAT: 0
BREAST MASS: 0
ARTHRALGIAS: 1
DIZZINESS: 0
COUGH: 0
HEADACHES: 0
CHILLS: 0
EYE PAIN: 0
HEMATOCHEZIA: 0
MYALGIAS: 0
PARESTHESIAS: 0
ABDOMINAL PAIN: 0
FREQUENCY: 0
SHORTNESS OF BREATH: 0
NERVOUS/ANXIOUS: 0
PALPITATIONS: 0
WEAKNESS: 0
JOINT SWELLING: 0
CONSTIPATION: 0
DIARRHEA: 0
NAUSEA: 0

## 2023-10-12 ASSESSMENT — PATIENT HEALTH QUESTIONNAIRE - PHQ9
5. POOR APPETITE OR OVEREATING: NOT AT ALL
10. IF YOU CHECKED OFF ANY PROBLEMS, HOW DIFFICULT HAVE THESE PROBLEMS MADE IT FOR YOU TO DO YOUR WORK, TAKE CARE OF THINGS AT HOME, OR GET ALONG WITH OTHER PEOPLE: NOT DIFFICULT AT ALL
SUM OF ALL RESPONSES TO PHQ QUESTIONS 1-9: 0
SUM OF ALL RESPONSES TO PHQ QUESTIONS 1-9: 0

## 2023-10-12 ASSESSMENT — PAIN SCALES - GENERAL: PAINLEVEL: NO PAIN (0)

## 2023-10-12 ASSESSMENT — ANXIETY QUESTIONNAIRES
GAD7 TOTAL SCORE: 0
GAD7 TOTAL SCORE: 0
1. FEELING NERVOUS, ANXIOUS, OR ON EDGE: NOT AT ALL
6. BECOMING EASILY ANNOYED OR IRRITABLE: NOT AT ALL
IF YOU CHECKED OFF ANY PROBLEMS ON THIS QUESTIONNAIRE, HOW DIFFICULT HAVE THESE PROBLEMS MADE IT FOR YOU TO DO YOUR WORK, TAKE CARE OF THINGS AT HOME, OR GET ALONG WITH OTHER PEOPLE: NOT DIFFICULT AT ALL
7. FEELING AFRAID AS IF SOMETHING AWFUL MIGHT HAPPEN: NOT AT ALL
3. WORRYING TOO MUCH ABOUT DIFFERENT THINGS: NOT AT ALL
2. NOT BEING ABLE TO STOP OR CONTROL WORRYING: NOT AT ALL
5. BEING SO RESTLESS THAT IT IS HARD TO SIT STILL: NOT AT ALL

## 2023-10-12 NOTE — PROGRESS NOTES
SUBJECTIVE:   CC: Kacy is an 53 year old who presents for preventive health visit.       10/12/2023    10:52 AM   Additional Questions   Roomed by Meir Siu   Accompanied by Self         10/12/2023    10:52 AM   Patient Reported Additional Medications   Patient reports taking the following new medications none       Healthy Habits:     Getting at least 3 servings of Calcium per day:  Yes    Bi-annual eye exam:  Yes    Dental care twice a year:  Yes    Sleep apnea or symptoms of sleep apnea:  None    Diet:  Regular (no restrictions)    Frequency of exercise:  6-7 days/week    Duration of exercise:  30-45 minutes    Taking medications regularly:  Yes    Medication side effects:  None    Additional concerns today:  Yes      Today's PHQ-9 Score:       10/12/2023    10:56 AM   PHQ-9 SCORE   PHQ-9 Total Score MyChart 0   PHQ-9 Total Score 0   Family history of breast cancer UTD on mammogram     Breast cancer screening, high risk patient as above     Menopause on vitD cancer and fosamax . Has IUD in - has      High risk HPV infection pap normal in past     Osteopenia of multiple sites as above     Encounter for immunization no flu or covid. Will take tetanus     Depression and Anxiety Follow-Up  How are you doing with your depression since your last visit? No change  How are you doing with your anxiety since your last visit?  No change  Are you having other symptoms that might be associated with depression or anxiety? No  Have you had a significant life event? No   Do you have any concerns with your use of alcohol or other drugs? No  On paxil for years- wants or needs long term    Joint Pain  Onset: Years  Description:   Location: Ball on the right  foot and on her left big toe   Character: Dull ache   Intensity: moderate  Progression of Symptoms: worse  Accompanying Signs & Symptoms:  Other symptoms: States it looks like the joint is enlarging on the left big toe   History:   Previous similar pain: no      Precipitating factors:   Trauma or overuse: YES  Alleviating factors:  Improved by: ice and Ibuprofen    Therapies Tried and outcome: Ice and Ibuprofen seems to help       Has seen DR Any Olivares in past for bunion surgery .   Having other issues.   Pt is a runner     Social History     Tobacco Use    Smoking status: Never    Smokeless tobacco: Never   Vaping Use    Vaping Use: Never used   Substance Use Topics    Alcohol use: No    Drug use: No         5/25/2022     7:00 AM 3/30/2023     1:53 PM 10/12/2023    10:56 AM   PHQ   PHQ-9 Total Score 0 0 0   Q9: Thoughts of better off dead/self-harm past 2 weeks Not at all Not at all Not at all         8/18/2020     3:00 PM 5/25/2022     7:00 AM 10/12/2023    11:09 AM   REGINA-7 SCORE   Total Score 0 0 0         10/12/2023    10:56 AM   Last PHQ-9   1.  Little interest or pleasure in doing things 0   2.  Feeling down, depressed, or hopeless 0   3.  Trouble falling or staying asleep, or sleeping too much 0   4.  Feeling tired or having little energy 0   5.  Poor appetite or overeating 0   6.  Feeling bad about yourself 0   7.  Trouble concentrating 0   8.  Moving slowly or restless 0   Q9: Thoughts of better off dead/self-harm past 2 weeks 0   PHQ-9 Total Score 0         10/12/2023    11:09 AM   REGINA-7    1. Feeling nervous, anxious, or on edge 0   2. Not being able to stop or control worrying 0   3. Worrying too much about different things 0   4. Trouble relaxing 0   5. Being so restless that it is hard to sit still 0   6. Becoming easily annoyed or irritable 0   7. Feeling afraid, as if something awful might happen 0   REGINA-7 Total Score 0   If you checked any problems, how difficult have they made it for you to do your work, take care of things at home, or get along with other people? Not difficult at all       Suicide Assessment Five-step Evaluation and Treatment (SAFE-T)                    Social History     Tobacco Use    Smoking status: Never    Smokeless tobacco: Never    Substance Use Topics    Alcohol use: No             10/12/2023    10:59 AM   Alcohol Use   Prescreen: >3 drinks/day or >7 drinks/week? No     Reviewed orders with patient.  Reviewed health maintenance and updated orders accordingly -   Labs reviewed in EPIC    Breast Cancer Screening:    FHS-7:       4/21/2022     8:40 AM 5/25/2022     7:54 AM 10/12/2023    11:01 AM   Breast CA Risk Assessment (FHS-7)   Did any of your first-degree relatives have breast or ovarian cancer? Yes Yes Yes   Did any of your relatives have bilateral breast cancer?  No No   Did any man in your family have breast cancer?  No No   Did any woman in your family have breast and ovarian cancer?  No No   Did any woman in your family have breast cancer before age 50 y?  Yes Yes   Do you have 2 or more relatives with breast and/or ovarian cancer?  Yes Yes   Do you have 2 or more relatives with breast and/or bowel cancer?  No No       Mammogram Screening: Recommended mammography every 1-2 years with patient discussion and risk factor consideration  Pertinent mammograms are reviewed under the imaging tab.    History of abnormal Pap smear: NO - age 30- 65 PAP every 3 years recommended      Latest Ref Rng & Units 5/25/2022     8:22 AM 1/8/2020    11:04 AM 9/14/2018     8:30 AM   PAP / HPV   PAP  Negative for Intraepithelial Lesion or Malignancy (NILM)      PAP (Historical)   NIL  NIL    HPV 16 DNA Negative Negative  Negative  Negative    HPV 18 DNA Negative Negative  Negative  Negative    Other HR HPV Negative Negative  Negative  Negative      Reviewed and updated as needed this visit by clinical staff   Tobacco  Allergies  Meds              Reviewed and updated as needed this visit by Provider                 Past Medical History:   Diagnosis Date    Carcinoma in situ of cervix uteri 05/19/2009    Dysthymic disorder 08/08/2001    FH: breast cancer in first degree relative when <50 years old     sister Age 41// BRCA negative    Other acne  01/10/2002    Panic disorder without agoraphobia 01/03/2011    Papanicolaou smear of cervix with high grade squamous intraepithelial lesion (HGSIL) 04/27/2009    Unspecified sinusitis (chronic) 06/05/2003      Past Surgical History:   Procedure Laterality Date    ------------OTHER-------------  2009    LEEP    ------------OTHER-------------  2005    IUD    ARTHROSCOPY SHLDR ROTATOR CUFF REPAIR, SUBACR DECOMP, DIST CLAVCL RESECTION, BICEP TENODESIS REPAIR Right 4/10/2023    Procedure: Arthroscopy Shoulder Rotator Cuff Evaulation, Subacromial  Decompression, Distal Clavcicle Excision, & Bicep Tenodesis Repair;  Surgeon: Ye Russo MD;  Location: [facility]    BIOPSY BREAST      breast lump    BUNIONECTOMY  2010    COLONOSCOPY N/A 10/4/2018    Repeat in 2028//Procedure: COLONOSCOPY;  COLONSCOPY with Polypectomy;  Surgeon: Jese Rizzo MD;  Location: [facility]    IR CONSULTATION FOR IR EXAM  1/10/2020    IR TRIGGER POINT INJ 1 OR 2 MUSCLES  1/29/2020    IR TRIGGER POINT INJ 1 OR 2 MUSCLES  9/21/2020    IR TRIGGER POINT INJ 1 OR 2 MUSCLES  2/24/2021    IR TRIGGER POINT INJ 1 OR 2 MUSCLES  11/24/2021       Review of Systems   Constitutional:  Negative for chills and fever.   HENT:  Negative for congestion, ear pain, hearing loss and sore throat.    Eyes:  Negative for pain and visual disturbance.   Respiratory:  Negative for cough and shortness of breath.    Cardiovascular:  Negative for chest pain, palpitations and peripheral edema.   Gastrointestinal:  Negative for abdominal pain, constipation, diarrhea, heartburn, hematochezia and nausea.   Breasts:  Negative for tenderness, breast mass and discharge.   Genitourinary:  Negative for dysuria, frequency, genital sores, hematuria, pelvic pain, urgency, vaginal bleeding and vaginal discharge.   Musculoskeletal:  Positive for arthralgias. Negative for joint swelling and myalgias.   Skin:  Negative for rash.   Neurological:  Negative for dizziness, weakness, headaches and  "paresthesias.   Psychiatric/Behavioral:  Negative for mood changes. The patient is not nervous/anxious.      CONSTITUTIONAL: NEGATIVE for fever, chills, change in weight  INTEGUMENTARY/SKIN: NEGATIVE for worrisome rashes, moles or lesions  EYES: NEGATIVE for vision changes or irritation  ENT: NEGATIVE for ear, mouth and throat problems  RESP: NEGATIVE for significant cough or SOB  BREAST: NEGATIVE for masses, tenderness or discharge  CV: NEGATIVE for chest pain, palpitations or peripheral edema  GI: NEGATIVE for nausea, abdominal pain, heartburn, or change in bowel habits  : NEGATIVE for unusual urinary or vaginal symptoms. No vaginal bleeding.  MUSCULOSKELETAL: NEGATIVE for significant arthralgias or myalgia  NEURO: NEGATIVE for weakness, dizziness or paresthesias  PSYCHIATRIC: NEGATIVE for changes in mood or affect      OBJECTIVE:   /70 (BP Location: Right arm, Patient Position: Sitting, Cuff Size: Adult Regular)   Pulse 64   Temp 98.3  F (36.8  C) (Tympanic)   Ht 1.593 m (5' 2.72\")   Wt 51.8 kg (114 lb 4.8 oz)   SpO2 99%   BMI 20.43 kg/m    Physical Exam  GENERAL: healthy, alert and no distress  EYES: Eyes grossly normal to inspection, PERRL and conjunctivae and sclerae normal  HENT: ear canals and TM's normal, nose and mouth without ulcers or lesions  NECK: no adenopathy, no asymmetry, masses, or scars and thyroid normal to palpation  RESP: lungs clear to auscultation - no rales, rhonchi or wheezes  BREAST: normal without masses, tenderness or nipple discharge and no palpable axillary masses or adenopathy  CV: regular rate and rhythm, normal S1 S2, no S3 or S4, no murmur, click or rub, no peripheral edema and peripheral pulses strong  ABDOMEN: soft, nontender, no hepatosplenomegaly, no masses and bowel sounds normal   (female): normal female external genitalia, normal urethral meatus, vaginal mucosa pink, moist, well rugated, and normal cervix/adnexa/uterus without masses or discharge  MS: no " gross musculoskeletal defects noted, no edema  SKIN: no suspicious lesions or rashes  NEURO: Normal strength and tone, mentation intact and speech normal  PSYCH: mentation appears normal, affect normal/bright    Results for orders placed or performed in visit on 10/12/23   TSH     Status: Normal   Result Value Ref Range    TSH 3.18 0.30 - 4.20 uIU/mL   Comprehensive metabolic panel     Status: Normal   Result Value Ref Range    Sodium 142 135 - 145 mmol/L    Potassium 4.3 3.4 - 5.3 mmol/L    Carbon Dioxide (CO2) 28 22 - 29 mmol/L    Anion Gap 8 7 - 15 mmol/L    Urea Nitrogen 10.5 6.0 - 20.0 mg/dL    Creatinine 0.75 0.51 - 0.95 mg/dL    GFR Estimate >90 >60 mL/min/1.73m2    Calcium 9.7 8.6 - 10.0 mg/dL    Chloride 106 98 - 107 mmol/L    Glucose 90 70 - 99 mg/dL    Alkaline Phosphatase 73 35 - 104 U/L    AST 23 0 - 45 U/L    ALT 11 0 - 50 U/L    Protein Total 7.5 6.4 - 8.3 g/dL    Albumin 4.8 3.5 - 5.2 g/dL    Bilirubin Total 0.2 <=1.2 mg/dL   CBC with platelets and differential     Status: Abnormal   Result Value Ref Range    WBC Count 3.8 (L) 4.0 - 11.0 10e3/uL    RBC Count 4.67 3.80 - 5.20 10e6/uL    Hemoglobin 14.3 11.7 - 15.7 g/dL    Hematocrit 43.4 35.0 - 47.0 %    MCV 93 78 - 100 fL    MCH 30.6 26.5 - 33.0 pg    MCHC 32.9 31.5 - 36.5 g/dL    RDW 12.3 10.0 - 15.0 %    Platelet Count 255 150 - 450 10e3/uL    % Neutrophils 52 %    % Lymphocytes 34 %    % Monocytes 11 %    Mids % (Monos, Eos, Basos)      % Eosinophils 2 %    % Basophils 1 %    % Immature Granulocytes 0 %    NRBCs per 100 WBC 0 <1 /100    Absolute Neutrophils 2.0 1.6 - 8.3 10e3/uL    Absolute Lymphocytes 1.3 0.8 - 5.3 10e3/uL    Absolute Monocytes 0.4 0.0 - 1.3 10e3/uL    Mids Abs (Monos, Eos, Basos)      Absolute Eosinophils 0.1 0.0 - 0.7 10e3/uL    Absolute Basophils 0.0 0.0 - 0.2 10e3/uL    Absolute Immature Granulocytes 0.0 <=0.4 10e3/uL    Absolute NRBCs 0.0 10e3/uL   CBC with Platelets & Differential     Status: Abnormal    Narrative    The  following orders were created for panel order CBC with Platelets & Differential.  Procedure                               Abnormality         Status                     ---------                               -----------         ------                     CBC with platelets and d...[[id number]]  Abnormal            Final result                 Please view results for these tests on the individual orders.         ASSESSMENT/PLAN:   (Z00.00) Well woman exam without gynecological exam  (primary encounter diagnosis)  Comment: doing real well.   Plan: CBC with Platelets & Differential,         Comprehensive metabolic panel, TSH        See in a year     (Z80.3) Family history of breast cancer in sister  Comment: mammogram UTD  Plan: CBC with Platelets & Differential,         Comprehensive metabolic panel, TSH            (Z12.39) Breast cancer screening, high risk patient  Comment: as above   Plan: CBC with Platelets & Differential,         Comprehensive metabolic panel, TSH            (Z78.0) Menopause  Comment: has clinical menopause - on vitamin d/ca   Plan: CBC with Platelets & Differential,         Comprehensive metabolic panel, TSH        Sx tolerable - does not need IUD    (B97.7) High risk HPV infection  Comment: no s/s of recurrance- vaginal exam normal  Plan: CBC with Platelets & Differential,         Comprehensive metabolic panel, TSH            (F41.8) Depression with anxiety  Comment: doing real well. Selective serotonin reuptake inhibitor for lifetime   Plan: CBC with Platelets & Differential,         Comprehensive metabolic panel, TSH            (M85.89) Osteopenia of multiple sites  Comment: on treatment .   Plan: CBC with Platelets & Differential,         Comprehensive metabolic panel, TSH        DEXA in 2 yrs    (Z23) Encounter for immunization  Comment: discussed   Plan: tetanus given    (M79.671,  M79.672) Bilateral foot pain  Comment: looked at feet - has several extra bony growth.   Plan: Orthopedic   Referral        Will refer     (Z30.291) Encounter for IUD removal  Comment: discussed that is has . Verbal consent to removed   Plan: REMOVE INTRAUTERINE DEVICE        Removal done but was quite adhered to uterus     Patient has been advised of split billing requirements and indicates understanding: Yes      COUNSELING:  Reviewed preventive health counseling, as reflected in patient instructions       Regular exercise       Healthy diet/nutrition        She reports that she has never smoked. She has never used smokeless tobacco.        Carlos Hart MD  [facility] - [place]  Answers submitted by the patient for this visit:  Patient Health Questionnaire (Submitted on 10/12/2023)  If you checked off any problems, how difficult have these problems made it for you to do your work, take care of things at home, or get along with other people?: Not difficult at all  PHQ9 TOTAL SCORE: 0

## 2023-10-31 ENCOUNTER — OFFICE VISIT (OUTPATIENT)
Dept: FAMILY MEDICINE | Facility: OTHER | Age: 53
End: 2023-10-31
Attending: NURSE PRACTITIONER
Payer: COMMERCIAL

## 2023-10-31 ENCOUNTER — TELEPHONE (OUTPATIENT)
Dept: PODIATRY | Facility: OTHER | Age: 53
End: 2023-10-31

## 2023-10-31 ENCOUNTER — ANCILLARY PROCEDURE (OUTPATIENT)
Dept: GENERAL RADIOLOGY | Facility: OTHER | Age: 53
End: 2023-10-31
Attending: NURSE PRACTITIONER
Payer: COMMERCIAL

## 2023-10-31 VITALS
HEIGHT: 63 IN | OXYGEN SATURATION: 98 % | TEMPERATURE: 96.7 F | HEART RATE: 59 BPM | DIASTOLIC BLOOD PRESSURE: 78 MMHG | SYSTOLIC BLOOD PRESSURE: 122 MMHG | BODY MASS INDEX: 21.19 KG/M2 | RESPIRATION RATE: 16 BRPM | WEIGHT: 119.6 LBS

## 2023-10-31 DIAGNOSIS — M79.674 PAIN OF TOE OF RIGHT FOOT: ICD-10-CM

## 2023-10-31 DIAGNOSIS — M79.674 PAIN OF TOE OF RIGHT FOOT: Primary | ICD-10-CM

## 2023-10-31 DIAGNOSIS — S92.404A CLOSED NONDISPLACED FRACTURE OF PHALANX OF RIGHT GREAT TOE, UNSPECIFIED PHALANX, INITIAL ENCOUNTER: ICD-10-CM

## 2023-10-31 PROCEDURE — 99213 OFFICE O/P EST LOW 20 MIN: CPT | Performed by: NURSE PRACTITIONER

## 2023-10-31 PROCEDURE — 73660 X-RAY EXAM OF TOE(S): CPT | Mod: TC | Performed by: RADIOLOGY

## 2023-10-31 ASSESSMENT — PAIN SCALES - GENERAL: PAINLEVEL: MODERATE PAIN (4)

## 2023-10-31 NOTE — PROGRESS NOTES
"  Assessment & Plan     Pain of toe of right foot  Closed nondisplaced fracture of phalanx of right great toe, unspecified phalanx, initial encounter  Patient with questionable right toe fracture. Will cintia tape and place in hard soled shoe. Will also send to podiatry for further management.         Janett Red NP  [facility] - ELI Woodruff is a 53 year old, presenting for the following health issues:  Musculoskeletal Problem (Injured Right Great toe)      HPI     Concern - Right great toe injury  Onset: 10/24/2023  Description: Jammed big toe while kicking a ball  Intensity: moderate  Progression of Symptoms:  worsening  Accompanying Signs & Symptoms: Swelling, bruising, throbbing pain  Previous history of similar problem: no  Precipitating factors:        Worsened by: all activity  Alleviating factors:        Improved by: Ibuprofen, elevation  Therapies tried and outcome: Ibuprofen, elevation helps        Review of Systems   Constitutional, HEENT, cardiovascular, pulmonary, gi and gu systems are negative, except as otherwise noted.      Objective    /78   Pulse 59   Temp (!) 96.7  F (35.9  C) (Tympanic)   Resp 16   Ht 1.593 m (5' 2.72\")   Wt 54.3 kg (119 lb 9.6 oz)   LMP  (LMP Unknown)   SpO2 98%   BMI 21.38 kg/m    Body mass index is 21.38 kg/m .  Physical Exam   GENERAL: healthy, alert and no distress  NEURO: Normal strength and tone, mentation intact and speech normal  PSYCH: mentation appears normal, affect normal/bright  RIGHT GREAT TOE: Skin intact. No erythema. Very mild swelling over entire toe with mild ecchymosis on distal portion. Generalized pain with palpation over entire toe. Able to flex and extend toe with moderate pain. Brisk capillary refill. Normal sensation.     PROCEDURE:  XR TOE RIGHT G/E 2 VIEWS     HISTORY: jammed great toe; Pain of toe of right foot     COMPARISON:  None.     TECHNIQUE:  2 views of the right toes were obtained.   "   FINDINGS:  Advanced arthritic changes are seen at the first  metatarsal-phalangeal joint. The remainder of the metatarsophalangeal  and the interphalangeal joints are normal. There is a questionable  nondisplaced fracture of the terminal phalanx of the great toe  medially. Follow-up x-ray in one weeks time is recommended.                                                                      IMPRESSION: Questionable nondisplaced fracture terminal phalanx of  great toe.     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

## 2023-10-31 NOTE — TELEPHONE ENCOUNTER
received a referral for pt to see podiatry related to a right toe fracture from [facility].  called pt but pt did not answer.  was able to LVM with a request to return call. Awaiting call back at this time.     Brianna Sanchez RN

## 2023-11-01 NOTE — TELEPHONE ENCOUNTER
Second attempt made to get a hold of pt to set up appointment with podiatry. No answer LVM with request to return call.     Brianna Sanchez RN

## 2023-11-02 NOTE — TELEPHONE ENCOUNTER
Third attempt to reach pt to set up appointment with podiatry. Pt did not answer phone. LVM and will send out a letter for pt to call and set up an appointment if needed.     Brianna Sanchez RN on 11/2/2023 at 8:57 AM

## 2023-11-13 ENCOUNTER — OFFICE VISIT (OUTPATIENT)
Dept: PODIATRY | Facility: OTHER | Age: 53
End: 2023-11-13
Attending: PODIATRIST
Payer: COMMERCIAL

## 2023-11-13 ENCOUNTER — ANCILLARY PROCEDURE (OUTPATIENT)
Dept: GENERAL RADIOLOGY | Facility: OTHER | Age: 53
End: 2023-11-13
Attending: PODIATRIST
Payer: COMMERCIAL

## 2023-11-13 VITALS
SYSTOLIC BLOOD PRESSURE: 128 MMHG | OXYGEN SATURATION: 99 % | DIASTOLIC BLOOD PRESSURE: 85 MMHG | HEART RATE: 65 BPM | TEMPERATURE: 98.6 F

## 2023-11-13 DIAGNOSIS — S90.211A: Primary | ICD-10-CM

## 2023-11-13 DIAGNOSIS — S90.211A: ICD-10-CM

## 2023-11-13 DIAGNOSIS — M77.41 METATARSALGIA OF RIGHT FOOT: ICD-10-CM

## 2023-11-13 PROCEDURE — 11730 AVULSION NAIL PLATE SIMPLE 1: CPT | Mod: T5 | Performed by: PODIATRIST

## 2023-11-13 PROCEDURE — 73630 X-RAY EXAM OF FOOT: CPT | Mod: TC | Performed by: RADIOLOGY

## 2023-11-13 PROCEDURE — 99203 OFFICE O/P NEW LOW 30 MIN: CPT | Mod: 25 | Performed by: PODIATRIST

## 2023-11-13 ASSESSMENT — PAIN SCALES - GENERAL: PAINLEVEL: MODERATE PAIN (4)

## 2023-11-13 NOTE — PATIENT INSTRUCTIONS
Nail procedure care:  -Start epsom salt soaks tomorrow. Soak the foot 1-2 times a day for 20 minutes.  -Apply an antibiotic cream, gauze and a bandaid over the toe for the first week after the procedure.  -After one week, continue the epsom salt soaks, but switch to a betadine dressing. Apply a small amount of betadine on gauze or dab the betadine over the toe with gauze and apply another dry gauze over the toe followed by a band aid or tape.  -Do not apply a band aid directly over the nail procedure site without gauze or it will trap too much moisture beneath the band aid.  Note: Continue the epsom salt soaks and dressings every day until the wound is fully healed. You should not see drainage on the bandage for at least two days in a row. Call the clinic if the wound is still moderately draining two weeks after the procedure.    Keep the toe covered at all times until it is completely healed.  -You may develop a black scab over the nail bed--let this fall off on its own and don't pick at it.  -The toe may drain for 2-3 weeks. It is normal for it to have a clear drainage.    Watching for signs of infection:  If the toe has a thick, white pus coming from the procedure site or if the the toe becomes red, swollen, painful, or you begin to feel sick (fever/chills/nausea/vomitting), return to the podiatry clinic immediately or to the Emergency Department room if after hours.      Podiatry can be reached directly at [phone]. Leave a voicemail if there is not an answer.

## 2023-11-13 NOTE — PROGRESS NOTES
Chief complaint: Patient presents with:  Musculoskeletal Problem: Right foot pain      History of Present Illness: This 53 year old female is seen at the request of No ref. provider found for evaluation and suggestions of management of RIGHT great toe pain. She kicked a soccer ball three weeks ago and it was instantly painful. The pain did not improve, so she came to the clinic two weeks ago. She was told she may  have a toe fracture. The is painful under the toenaikl.     She was given a walking shoe that she wears at times. She wears tennis shoes at home and the toe feels okay in her tennis shoes.    Additionally, the joint space space on the RIGHT second MTPJ has been painful for a long time. She says this pain has been ongoing for over a year and the pain is sometimes deep and and on the dorsal foot. The pain has been getting worse and is painful when she is walking and when she is running.    Historically, patient had a bilateral bunion surgery. She has not had pain from the bunions or great toe joints since they were surgically corrected.    No further pedal complaints today.       /85 (BP Location: Left arm, Patient Position: Sitting, Cuff Size: Adult Regular)   Pulse 65   Temp 98.6  F (37  C) (Tympanic)   LMP  (LMP Unknown)   SpO2 99%     Patient Active Problem List   Diagnosis    Annual physical exam    History of abnormal Pap smear    Family planning    Dysthymia    Family history of breast cancer in sister    Depression with anxiety    High risk HPV infection    Breast cancer screening, high risk patient    Nut allergy    Stenosis, cervix    Chondromalacia of patella, left    Chondromalacia of patella, right    Subluxation of left patella, initial encounter    Subluxation of right patella, initial encounter    Carcinoma in situ of cervix uteri    Perimenopausal    Osteopenia of multiple sites       Past Surgical History:   Procedure Laterality Date    ------------OTHER-------------  2009    [facility]     ------------OTHER-------------  2005    IUD    ARTHROSCOPY SHLDR ROTATOR CUFF REPAIR, SUBACR DECOMP, DIST CLAVCL RESECTION, BICEP TENODESIS REPAIR Right 4/10/2023    Procedure: Arthroscopy Shoulder Rotator Cuff Evaulation, Subacromial  Decompression, Distal Clavcicle Excision, & Bicep Tenodesis Repair;  Surgeon: Ye Russo MD;  Location: [facility] OR    BIOPSY BREAST      breast lump    BUNIONECTOMY  2010    COLONOSCOPY N/A 10/4/2018    Repeat in 2028//Procedure: COLONOSCOPY;  COLONSCOPY with Polypectomy;  Surgeon: Jese Rizzo MD;  Location: [facility] OR    IR CONSULTATION FOR IR EXAM  1/10/2020    IR TRIGGER POINT INJ 1 OR 2 MUSCLES  1/29/2020    IR TRIGGER POINT INJ 1 OR 2 MUSCLES  9/21/2020    IR TRIGGER POINT INJ 1 OR 2 MUSCLES  2/24/2021    IR TRIGGER POINT INJ 1 OR 2 MUSCLES  11/24/2021       Current Outpatient Medications   Medication    alendronate (FOSAMAX) 35 MG tablet    EPINEPHrine (EPIPEN/ADRENACLICK/OR ANY BX GENERIC EQUIV) 0.3 MG/0.3ML injection 2-pack    ibuprofen (ADVIL/MOTRIN) 600 MG tablet    ibuprofen (ADVIL/MOTRIN) 600 MG tablet    levonorgestrel (MIRENA) 20 MCG/24HR IUD    nystatin-triamcinolone (MYCOLOG II) 099115-4.1 UNIT/GM-% external cream    PARoxetine (PAXIL) 10 MG tablet     No current facility-administered medications for this visit.          Allergies   Allergen Reactions    Nuts Other (See Comments)     Nut flavor - throat tightens       Family History   Problem Relation Age of Onset    Cancer Sister 48        breast    Breast Cancer Paternal Grandmother 71       Social History     Socioeconomic History    Marital status:    Tobacco Use    Smoking status: Never    Smokeless tobacco: Never   Vaping Use    Vaping Use: Never used   Substance and Sexual Activity    Alcohol use: No    Drug use: No    Sexual activity: Yes     Partners: Male     Birth control/protection: I.U.D.   Other Topics Concern    Parent/sibling w/ CABG, MI or angioplasty before 65F 55M? No     Social  Determinants of Health     Financial Resource Strain: Low Risk  (10/12/2023)    Financial Resource Strain     Within the past 12 months, have you or your family members you live with been unable to get utilities (heat, electricity) when it was really needed?: No   Food Insecurity: Low Risk  (10/12/2023)    Food Insecurity     Within the past 12 months, did you worry that your food would run out before you got money to buy more?: No     Within the past 12 months, did the food you bought just not last and you didn t have money to get more?: No   Transportation Needs: Low Risk  (10/12/2023)    Transportation Needs     Within the past 12 months, has lack of transportation kept you from medical appointments, getting your medicines, non-medical meetings or appointments, work, or from getting things that you need?: No   Interpersonal Safety: Low Risk  (10/12/2023)    Interpersonal Safety     Do you feel physically and emotionally safe where you currently live?: Yes     Within the past 12 months, have you been hit, slapped, kicked or otherwise physically hurt by someone?: No     Within the past 12 months, have you been humiliated or emotionally abused in other ways by your partner or ex-partner?: No   Housing Stability: Low Risk  (10/12/2023)    Housing Stability     Do you have housing? : Yes     Are you worried about losing your housing?: No       ROS: 10 point ROS neg other than the symptoms noted above in the HPI.  EXAM  Constitutional: healthy, alert, and no distress    Psychiatric: mentation appears normal and affect normal/bright    VASCULAR:  -Dorsalis pedis pulse +2/4 b/l  -Posterior tibial pulse +2/4 b/l  -Capillary refill time < 3 seconds to b/l hallux  NEURO:  -Light touch sensation intact to b/l plantar forefoot  DERM:  -Skin temperature, texture and turgor WNL b/l  -RIGHT hallux toenail with ecchymosis and loose from the proximal 1/3 of the nail bed  MSK:  -Pain on palpation to the RIGHT plantar 2nd metatarsal  head and the plantar MTPJ  -Pain on palpation to the dorsal nail plate (pain increases toward the proximal toenail border)  -Mild tenderness on palpation to the medial and lateral hallux IPJ  -Muscle strength of ankles +5/5 for dorsiflexion, plantarflexion, ABDUction and ADDuction b/l    RIGHT TOE RADIOGRAPHS 10/31/2023  IMPRESSION: Questionable nondisplaced fracture terminal phalanx of great toe.  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  DARIO JONES MD   ============================================================    ASSESSMENT:  (S90.211A) Traumatic subungual ecchymosis of right great toe  (primary encounter diagnosis)    (M77.41) Metatarsalgia of right foot      PLAN:  -Patient evaluated and examined. Treatment options discussed with no educational barriers noted.    Traumatic ecchymosis of toenail on RIGHT hallux:  -Discussed nail procedure options and etiologies and treatments for traumatic ecchymosis. The toenail appears loose with a subungual hematoma at the proximal 1/3 of the toenail. The trauma of kicking the soccer ball while barefooted may have traumatized the proximal toenail. The underlying nail bed can be punctured by the toenail and can create a laceration of the underlying nail bed. The laceration can be superficial or is can be deep. Her toenail is still painful with palpation three weeks after the injury. She may continue to monitor the toe, but if there is an underlying nail laceration, there is increased risk of infection (especially of the bone if it is a deep laceration). Additionally, it is unknown how long the toenail will continue to cause pain if there is trauma beneath the toenail. Whether or not the toenail is removed, it is not certain if the new toenail growing back will grow back as a regular toenail (it may grow back more thick or  "discolored). Removing the toenail may relieve the pressure and reduce the pain on the dorsal nail bed. She understands the risks and benefits and has decided to proceed with the following:    -Toenail avulsion of right hallux toenail: Written and verbal consent obtained after reviewing risks and benefits of the procedure. Patient understands that although phenol is used in attempt to prevent regrowth of the ingrown toenail, the nail can still grow back. There is also a risk of post procedure infection. A severe foot infection could lead to a proximal foot or leg amputation or loss of life, so the patient is advised to return to podiatry or the ED immediately if the patient notices any SOI. The patient is in agreement with this plan and wishes to proceed with the procedure. A time-out was performed to identify the correct patient, limb, digit and procedure.    An alcohol prep pad was applied to  to the base of the right hallux. The digit was injected with 6 mL of 1:1 of 2% Lidocaine plain and 0.5% marcaine plain in a ring block fashion at the base of the toe(s). Adequate local anesthesia was obtained. A ring tournicot was applied to the digit and a chloroprep was applied to the hallux. A freer was used to loosen the nail from the underlying nail bed. An English Anvil and a hemostat were then used to remove the nail in total. The nail bed was laceration at the proximal 1/3 of the nail bed through the subcutaneous tissue layer, but did not probe to bone. The digit was rinsed thoroughly with alcohol. The tournicot was removed from the toe and there was a prompt hyperemic response to the hallux. The wound was then dressed with an Silvadene, gauze and 1\" coban. The patient was educated on after procedure care including daily epsom salt soaks starting tomorrow followed by dressing of the toe with an antibiotic cream and a bandaid until the wound site on the toe stops draining (2-3 weeks). Provided education on how to look " for signs of infection (redness, swelling, pain, purulence, fever, chills, nausea, vomiting) and the patient was instructed to return to the clinic or Emergency Department immediately if there are any signs of infection.    -Repeated RIGHT foot radiographs ton 11/13/2023 -- no obvious fracture noted today. Patient will be called with results. She is still advised to wear more rigid tennis shoes for the next four weeks if her toe pain does not improve after the nail avulsion site heals. Patient will be called with the results.    ------------------------------------------------    Metatarsalgia:  -Discussed potential causes and treatment options for metatarsalgia. Pain along the metatarsals can be caused from a number of factors, but is commonly caused by an imbalance of the biomechanics. This can include but is not limited to a flat foot, high arched foot, tendon imbalance, gastrocnemius equinus, and compensation for pain in other areas of the foot. In this case, patient has an elongated second metatarsal in comparison to the first metatarsal head. This is partially due to the reduction in the joint space at the 1st MTPJ. She says her second toe has always been a longer toe, so she may have already had a longer metatarsal originally.  ---Conservative treatment options consist of wider shoe gear and orthotics +/- padding/splinting to correct the biomechanics of the foot. Consistently wearing supportive shoe gear can also decrease this pain (stability shoe gear involves wearing a stability shoe that bends at the toe, but has a solid midfoot shank and heel contour).   ---Surgical treatment options may be considered if conservative treatment does not improve the pain. Surgery should only be considered if the pain is limiting every day activities of the patient. Surgery may include a Weil osteotomy. Discussed post-op recovery time as well as risks and benefits of the procedure(s).  ---Patient understands the treatment  options. At this time, the patient wishes to pursue a metatarsal pad with a CMO at this time.  -This is an acute, uncomplicated illness/injury with OTC treatment options reviewed.    -Patient in agreement with the above treatment plan and all of patient's questions were answered.      Return to clinic three months to evaluate RIGHT plantar second metatarsal head pain -- earlier if hallux causes pain or discomfort        Erica Franz DPM, CHANTAL

## 2023-12-07 ENCOUNTER — OFFICE VISIT (OUTPATIENT)
Dept: FAMILY MEDICINE | Facility: OTHER | Age: 53
End: 2023-12-07
Attending: FAMILY MEDICINE
Payer: OTHER MISCELLANEOUS

## 2023-12-07 ENCOUNTER — NURSE TRIAGE (OUTPATIENT)
Dept: FAMILY MEDICINE | Facility: OTHER | Age: 53
End: 2023-12-07

## 2023-12-07 ENCOUNTER — HOSPITAL ENCOUNTER (OUTPATIENT)
Dept: CT IMAGING | Facility: HOSPITAL | Age: 53
Discharge: HOME | End: 2023-12-07
Attending: FAMILY MEDICINE
Payer: OTHER MISCELLANEOUS

## 2023-12-07 VITALS
HEART RATE: 95 BPM | BODY MASS INDEX: 22.14 KG/M2 | TEMPERATURE: 98.7 F | OXYGEN SATURATION: 98 % | SYSTOLIC BLOOD PRESSURE: 120 MMHG | HEIGHT: 62 IN | WEIGHT: 120.3 LBS | DIASTOLIC BLOOD PRESSURE: 60 MMHG

## 2023-12-07 DIAGNOSIS — S09.93XA FACIAL TRAUMA, INITIAL ENCOUNTER: ICD-10-CM

## 2023-12-07 DIAGNOSIS — T14.8XXA CONTUSION OF BONE: ICD-10-CM

## 2023-12-07 DIAGNOSIS — S00.12XA PERIORBITAL ECCHYMOSIS OF LEFT EYE, INITIAL ENCOUNTER: ICD-10-CM

## 2023-12-07 DIAGNOSIS — S06.0X0A CONCUSSION WITHOUT LOSS OF CONSCIOUSNESS, INITIAL ENCOUNTER: ICD-10-CM

## 2023-12-07 DIAGNOSIS — S09.93XA FACIAL TRAUMA, INITIAL ENCOUNTER: Primary | ICD-10-CM

## 2023-12-07 PROCEDURE — 99213 OFFICE O/P EST LOW 20 MIN: CPT | Performed by: FAMILY MEDICINE

## 2023-12-07 PROCEDURE — 70486 CT MAXILLOFACIAL W/O DYE: CPT

## 2023-12-07 ASSESSMENT — PAIN SCALES - GENERAL: PAINLEVEL: MILD PAIN (3)

## 2023-12-07 NOTE — PROGRESS NOTES
Occupational Visit     SUBJECTIVE:  Kacy Valdez, 53 year old, female is seen for new evaluation and treatment of occupational injury. Date of injury is 12/01/2023.    Linked Episodes   Type: Episode: Status: Noted: Resolved: Last update: Updated by:   WORK COMP Face injury Active 12/7/2023 12/7/2023  9:41 AM Karla Bella LPN      Comments:           Duration: 12/01/2023  Description (location/character/radiation): removing a monitor and it hit her in the face. Eye pain, pressure, headaches, eye is a little blurry.  Intensity:  mild  Accompanying signs and symptoms: Eye pain, pressure, headaches, eye is a little blurry.  History (similar episodes/previous evaluation): None  Precipitating or alleviating factors: ibuprofen  Therapies tried and outcome: Ice pack first day of injury    Removing spring like bracing on desk when stapped back and hit on side of left eye  It pushed her back - not fall  Did cause significant swelling and bruising with mild breakdown of skin / abrasion   Pt felt in shock and possible mild concussion sx for rest of the day / half a day   Ice and rest right now-- decided to come in due to still fair amt of pain and tenderness   Feels like lateral peripheral  vision is fuzzy or blurred   Globe of eye tender laterally but no injection /redness of globe  No double vision / no issues with ROM of eyes          Allergies   Allergen Reactions    Nuts Other (See Comments)     Nut flavor - throat tightens         Review of Systems:  Constitutional, HEENT, cardiovascular, pulmonary, gi and gu systems are negative, except as otherwise noted.      OBJECTIVE:  Vitals:    12/07/23 0946   BP: 120/60   Pulse: 95   Temp: 98.7  F (37.1  C)   SpO2: 98%               Exam:  GENERAL:: healthy, alert and no distress  EYES: Eyes grossly normal to inspection, extraocular movements - intact, and PERRL-- full ROM / no defects on ROM  Face -- very tender over inferior to lateral orbit /zygomatic arch - no step  offs. Bruise and very mild bruising and abrasion noted   HENT: ear canals- normal; TMs- normal; Nose- normal; Mouth- no ulcers, no lesions  SKIN: no suspicious lesions, no rashes- as above     Results for orders placed or performed during the hospital encounter of 12/07/23   CT Facial Bones without Contrast     Status: None    Narrative    EXAM: CT FACIAL BONES WITHOUT CONTRAST 12/7/2023 11:43 AM    PROVIDED HISTORY: Facial trauma, initial encounter    COMPARISON: None    TECHNIQUE:   Imaging protocol: Multiplanar CT images of the facial bones without  intravenous contrast.   Acquisition: This CT exam was performed using one or more the  following dose reduction techniques: automated exposure control,  adjustment of the mA and/or kV according to patient size, and/or  iterative reconstruction technique.    FINDINGS:  There is no evident fracture of the facial bones. The cribriform plate  appears intact. Alignment of the facial bones appears normal.     There is no significant soft tissue swelling of the face. Normal  retromaxillary and pterygopalatine fat. There is no hematoma, soft  tissue mass or gas visualized within the orbits. The visualized  portions of the paranasal sinuses are clear. Moderate left, and mild  right, degenerative changes of the temporomandibular joints.      Impression    IMPRESSION:  No acute abnormality of the facial bones.    JOVAN HARP MD         SYSTEM ID:  [id number]       ASSESSMENT/PLAN:    ICD-10-CM    1. Facial trauma, initial encounter  S09.93XA CT Facial Bones without Contrast      2. Concussion without loss of consciousness, initial encounter  S06.0X0A       3. Periorbital ecchymosis of left eye, initial encounter  S00.12XA       4. Contusion of bone  T14.8XXA       Discussed,.   No work restrictions needed  Discussed in length conservative measures of OTC medications for pain, Ice/Heat, elevation and the concept of R.I.C.E.. Continue behavioral changes and proper body mechanics to  allow for healing. Follow up as directed.   I do not feel needs to see Eye doc  Time will heal- ice /heat   Follow-up in 6 weeks to hope to sign off.   Symptomatic treatment was discussed along when patient should call and/or come back into the clinic or go to ER/Urgent care. All questions answered.

## 2023-12-07 NOTE — TELEPHONE ENCOUNTER
Disposition: See in office today     Patient called clinic facial injury. Patient reports spring computer monitor hit her in the left side of the face. Patient has a bruise and a small cut on the left eye. Patient states injury occurred 12/01/23. Patient reports headache and eye pain. Patient denies any nausea or dizziness. Patient denies any one sided weakness or numbness or slurred speech. Per protocol patient scheduled to see PCP today.     Reason for Disposition   Large swelling or bruise (> 2 inches or 5 cm)    Additional Information   Negative: Major bleeding (actively dripping or spurting) that can't be stopped   Negative: Bullet, knife or other serious penetrating wound   Negative: Difficulty breathing or choking   Negative: Knocked out (unconscious) > 1 minute   Negative: Difficult to awaken or acting confused (e.g., disoriented, slurred speech)   Negative: Severe neck pain   Negative: Sounds like a life-threatening emergency to the triager   Negative: Head or forehead injury is main concern   Negative: Neck injury is main concern   Negative: Eye or orbit injury is main concern   Negative: Ear injury is main concern   Negative: Mouth injury is main concern   Negative: Nose injury is main concern   Negative: Teeth or tooth injury is main concern   Negative: Wound looks infected   Negative: Bleeding won't stop after 10 minutes of direct pressure (using correct technique)   Negative: Skin is split open or gaping (or length > 1/4 inch or 6 mm)   Negative: Crooked face or smile   Negative: Looks like a broken bone (e.g., cheekbone is flat on one side)   Negative: Can't fully open or close the mouth   Negative: Neck pain   Negative: Injury caused by high speed (e.g., MVA), great height (e.g., fall > 10 feet or 3 meters), or severe blow from hard object (e.g., golf club or baseball bat)   Negative: Knocked out (unconscious) < 1 minute and now fine   Negative: Closing the mouth and biting down does not feel  "normal   Negative: Double vision or unable to look upward   Negative: Numbness of the face (i.e., claims loss of sensation in part of face)   Negative: Sounds like a serious injury to the triager   Negative: Taking Coumadin (warfarin) or other strong blood thinner, or known bleeding disorder (e.g., thrombocytopenia)    Answer Assessment - Initial Assessment Questions  1. MECHANISM: \"How did the injury happen?\"       Metal spring computer monitor hit patient in the face  2. ONSET: \"When did the injury happen?\" (Minutes or hours ago)      Last Friday 12/01  3. LOCATION: \"What part of the face is injured?\"      Left side of face  4. APPEARANCE of INJURY: \"What does the face look like?\"      Bruised eye and small laceration on left side of eye  5. BLEEDING: \"Is it bleeding now?\" If Yes, ask: \"Is it difficult to stop?\"      no  6. PAIN: \"Is there pain?\" If Yes, ask: \"How bad is the pain?\"  (e.g., Scale 1-10; or mild, moderate, severe)      Headaches and tender around the eye  7. SIZE: For cuts, bruises, or swelling, ask: \"How large is it?\" (e.g., inches or centimeters)       Cut is 1/2 inches, bruising around left eye  8. TETANUS: For any breaks in the skin, ask: \"When was the last tetanus booster?\"      Doesn't know  9. OTHER SYMPTOMS: \"Do you have any other symptoms?\" (e.g., neck pain, headache, loss of consciousness)      headaches  10. PREGNANCY: \"Is there any chance you are pregnant?\" \"When was your last menstrual period?\"        no    Protocols used: Face Injury-A-[facility]    "

## 2024-01-18 ENCOUNTER — OFFICE VISIT (OUTPATIENT)
Dept: FAMILY MEDICINE | Facility: OTHER | Age: 54
End: 2024-01-18
Attending: FAMILY MEDICINE
Payer: OTHER MISCELLANEOUS

## 2024-01-18 VITALS
DIASTOLIC BLOOD PRESSURE: 84 MMHG | WEIGHT: 122.13 LBS | HEART RATE: 80 BPM | OXYGEN SATURATION: 98 % | TEMPERATURE: 97.2 F | SYSTOLIC BLOOD PRESSURE: 122 MMHG | BODY MASS INDEX: 22.34 KG/M2

## 2024-01-18 DIAGNOSIS — S06.0X0A CONCUSSION WITHOUT LOSS OF CONSCIOUSNESS, INITIAL ENCOUNTER: ICD-10-CM

## 2024-01-18 DIAGNOSIS — S00.12XA PERIORBITAL ECCHYMOSIS OF LEFT EYE, INITIAL ENCOUNTER: ICD-10-CM

## 2024-01-18 DIAGNOSIS — T14.8XXA CONTUSION OF BONE: ICD-10-CM

## 2024-01-18 DIAGNOSIS — S09.93XA FACIAL TRAUMA, INITIAL ENCOUNTER: Primary | ICD-10-CM

## 2024-01-18 PROCEDURE — 99213 OFFICE O/P EST LOW 20 MIN: CPT | Performed by: FAMILY MEDICINE

## 2024-01-18 ASSESSMENT — PAIN SCALES - GENERAL: PAINLEVEL: NO PAIN (0)

## 2024-01-18 NOTE — PROGRESS NOTES
Subjective   Kacy is a 53 year old, presenting for the following health issues:  Work Comp        1/18/2024     8:23 AM   Additional Questions   Roomed by Enedina Boyd   Accompanied by None         1/18/2024     8:23 AM   Patient Reported Additional Medications   Patient reports taking the following new medications None     HPI       Occupational Visit     SUBJECTIVE:  Kacy Valdez, 53 year old, female is seen for follow up of occupational injury. Date of injury is 12/01/2023  .    Linked Episodes   Type: Episode: Status: Noted: Resolved: Last update: Updated by:   WORK COMP Face injury Active 12/7/2023 1/18/2024  8:24 AM Karla Bella LPN      Comments:       Left side of the face     Duration: 12/01/2023  Description (location/character/radiation): work comp injury to left side of the face near the eye   Accompanying signs and symptoms: tenderness near the left eye   History (similar episodes/previous evaluation): None  Precipitating or alleviating factors: None  Therapies tried and outcome: None  All improving  Still mild facial tenderness  HA resolved in 2 weeks after event  No visual issues          Allergies   Allergen Reactions    Nuts Other (See Comments)     Nut flavor - throat tightens         Review of Systems:  CONSTITUTIONAL:NEGATIVE for fever, chills, change in weight      OBJECTIVE:  Vitals:    01/18/24 0826   BP: 122/84   Pulse: 80   Temp: 97.2  F (36.2  C)   SpO2: 98%               Exam:  GENERAL:: healthy, alert and no distress  EYES: Eyes grossly normal to inspection, extraocular movements - intact, and PERRL  Face- minimal tender over lateral left periorbital region     Labs: No results found for this or any previous visit (from the past 24 hour(s)).      ASSESSMENT/PLAN    ICD-10-CM    1. Facial trauma, initial encounter  S09.93XA       2. Periorbital ecchymosis of left eye, initial encounter  S00.12XA       3. Contusion of bone  T14.8XXA       4. Concussion without loss of  consciousness, initial encounter  S06.0X0A       Improving nicely   No reason for further follow-up  Pt is at [facility] .

## 2024-01-29 ENCOUNTER — OFFICE VISIT (OUTPATIENT)
Dept: FAMILY MEDICINE | Facility: OTHER | Age: 54
End: 2024-01-29
Attending: FAMILY MEDICINE
Payer: COMMERCIAL

## 2024-01-29 ENCOUNTER — TELEPHONE (OUTPATIENT)
Dept: FAMILY MEDICINE | Facility: OTHER | Age: 54
End: 2024-01-29

## 2024-01-29 VITALS
TEMPERATURE: 98.1 F | DIASTOLIC BLOOD PRESSURE: 70 MMHG | HEIGHT: 62 IN | BODY MASS INDEX: 22.14 KG/M2 | SYSTOLIC BLOOD PRESSURE: 120 MMHG | OXYGEN SATURATION: 98 % | WEIGHT: 120.3 LBS | HEART RATE: 89 BPM

## 2024-01-29 DIAGNOSIS — S39.012A STRAIN OF LUMBAR REGION, INITIAL ENCOUNTER: Primary | ICD-10-CM

## 2024-01-29 PROCEDURE — 99213 OFFICE O/P EST LOW 20 MIN: CPT | Performed by: FAMILY MEDICINE

## 2024-01-29 RX ORDER — OXYCODONE HYDROCHLORIDE 5 MG/1
5 TABLET ORAL EVERY 4 HOURS PRN
Qty: 18 TABLET | Refills: 0 | Status: SHIPPED | OUTPATIENT
Start: 2024-01-29 | End: 2024-02-01

## 2024-01-29 RX ORDER — TIZANIDINE 2 MG/1
TABLET ORAL
Qty: 40 TABLET | Refills: 0 | Status: SHIPPED | OUTPATIENT
Start: 2024-01-29

## 2024-01-29 ASSESSMENT — PAIN SCALES - GENERAL: PAINLEVEL: MODERATE PAIN (5)

## 2024-01-29 NOTE — TELEPHONE ENCOUNTER
8:16 AM    Reason for Call: OVERBOOK    Patient is having the following symptoms: lower back pain for 2 days.     The patient is requesting an appointment for today with Dr Hart.    Was an appointment offered for this call? No  If yes : Appointment type              Date    Preferred method for responding to this message: Telephone Call  What is your phone number ? [phone]     If we cannot reach you directly, may we leave a detailed response at the number you provided? Yes    Can this message wait until your PCP/provider returns, if unavailable today? Karen Osuna

## 2024-01-29 NOTE — PROGRESS NOTES
Assessment & Plan     Strain of lumbar region, initial encounter  Good alignment - just pain and some spasming. Will treat with adding below. Pain meds given since flying to [place]. R/B of pain  meds and muscle relaxer discussed. Discussed in length conservative measures of OTC medications for pain, Ice/Heat, elevation and the concept of R.I.C.E.. Continue behavioral changes and proper body mechanics to allow for healing. Follow up as directed.   Discussed behavioral changes and proper body mechanics needed to help control patient's back pain.   - tiZANidine (ZANAFLEX) 2 MG tablet; 1-2 tabs orally three times a day  - oxyCODONE (ROXICODONE) 5 MG tablet; Take 1 tablet (5 mg) by mouth every 4 hours as needed for severe pain  - Physical Therapy Referral; Future                No follow-ups on file.    Benito Woodruff is a 53 year old, presenting for the following health issues:  Back Pain        1/29/2024     2:09 PM   Additional Questions   Roomed by Karla MCGINNIS LPN   Accompanied by self     HPI     Pain History:  When did you first notice your pain? 2 days   Have you seen anyone else for your pain? No  How has your pain affected your ability to work? 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 Back Pain  Onset/Duration: Last night around dinner  Description:   Location of pain: low back bilateral  Character of pain: cramping and constant  Pain radiation: none  New numbness or weakness in legs, not attributed to pain: no   Intensity: At its worst 8/10  Progression of Symptoms: worsening  History:   Specific cause: none  Pain interferes with job: YES- Patient has a hard time sitting at desk due to pain and location.   History of back problems: no prior back problems  Any previous MRI or X-rays: None  Sees a specialist for back pain: No  Alleviating factors:   Improved by: acetaminophen (Tylenol), heat, and NSAIDs    Precipitating  "factors:  Worsened by: Sitting and moving.   Therapies tried and outcome: acetaminophen (Tylenol), heat, and NSAIDs- mild relief      Happened yesterday in kitchen - reached and felt spasm or tightening   Pain and tight across low back   Nothing down legs   Having constant tight with acute spasm   Did not sleep   Took alternate tylenol and motrin   Heat.         Leaving for [place] - [facility] graduates   Objective    /70 (BP Location: Left arm, Patient Position: Sitting, Cuff Size: Adult Regular)   Pulse 89   Temp 98.1  F (36.7  C) (Tympanic)   Ht 1.575 m (5' 2\")   Wt 54.6 kg (120 lb 4.8 oz)   SpO2 98%   BMI 22.00 kg/m    Body mass index is 22 kg/m .  Physical Exam   GENERAL: alert and no distress  MS: no gross musculoskeletal defects noted, no edema  NEURO: Normal strength and tone, mentation intact and speech normal  BACK: no CVA tenderness, low back bilateral  paralumbar tenderness            Signed Electronically by: Carlos Hart MD    "

## 2024-02-13 ENCOUNTER — OFFICE VISIT (OUTPATIENT)
Dept: PODIATRY | Facility: OTHER | Age: 54
End: 2024-02-13
Attending: PODIATRIST
Payer: COMMERCIAL

## 2024-02-13 VITALS
TEMPERATURE: 97.1 F | HEART RATE: 66 BPM | DIASTOLIC BLOOD PRESSURE: 80 MMHG | SYSTOLIC BLOOD PRESSURE: 119 MMHG | OXYGEN SATURATION: 98 %

## 2024-02-13 DIAGNOSIS — M77.41 METATARSALGIA OF RIGHT FOOT: Primary | ICD-10-CM

## 2024-02-13 PROCEDURE — 99214 OFFICE O/P EST MOD 30 MIN: CPT | Performed by: PODIATRIST

## 2024-02-13 ASSESSMENT — PAIN SCALES - GENERAL: PAINLEVEL: NO PAIN (0)

## 2024-02-13 NOTE — PROGRESS NOTES
Chief complaint: Patient presents with:  Musculoskeletal Problem: Right foot pain follow up      History of Present Illness: This 53 year old female is seen fr follow-up management of RIGHT second MTPJ pain. The pain feels about the same as it did at her previous appointment. She is mostly wearing tennis shoes. Pain is still improved when wearing tennis shoes. She received inserts (CMOs) through the orthotist, Iram Salinas around January, 2024. She says they are okay and she is slowing adjusting to them. She is not yet wearing them full time. She has had an increase in RIGHT 1st MTPJ pain as well, but not as badly as the plantar 2nd MTPJ and metatarsal head pain. She is looking for treatment options.    She also had a toenail avulsion of the RIGHT hallux toenail on 11/13/2023.    No further pedal complaints today.       History of RIGHT foot pain: Patient kicked a soccer ball at the end of October, 2023, and the toe was instantly painful. The pain did not improve, so she came to the clinic two weeks ago. She was told she may have a toe fracture.  She was given a walking shoe that she wore at times. She wore tennis shoes at home and the toe felt better when wearing tennis shoes.    Additionally, the joint space space on the RIGHT second MTPJ had  been painful for a long time. She says this pain had been ongoing for over a year (since the fall of 2022) and the pain was sometimes deep and and on the dorsal foot. The pain had been getting worse and was painful when she was walking and when she was running.    Historically, patient had a bilateral bunion surgery.         /80 (BP Location: Left arm, Patient Position: Sitting, Cuff Size: Adult Regular)   Pulse 66   Temp 97.1  F (36.2  C) (Tympanic)   SpO2 98%     Patient Active Problem List   Diagnosis    Annual physical exam    History of abnormal Pap smear    Family planning    Dysthymia    Family history of breast cancer in sister    Depression with anxiety     High risk HPV infection    Breast cancer screening, high risk patient    Nut allergy    Stenosis, cervix    Chondromalacia of patella, left    Chondromalacia of patella, right    Subluxation of left patella, initial encounter    Subluxation of right patella, initial encounter    Carcinoma in situ of cervix uteri    Perimenopausal    Osteopenia of multiple sites       Past Surgical History:   Procedure Laterality Date    ------------OTHER-------------  2009    LEEP    ------------OTHER-------------  2005    IUD    ARTHROSCOPY SHLDR ROTATOR CUFF REPAIR, SUBACR DECOMP, DIST CLAVCL RESECTION, BICEP TENODESIS REPAIR Right 4/10/2023    Procedure: Arthroscopy Shoulder Rotator Cuff Evaulation, Subacromial  Decompression, Distal Clavcicle Excision, & Bicep Tenodesis Repair;  Surgeon: Ye Russo MD;  Location: [facility] OR    BIOPSY BREAST      breast lump    BUNIONECTOMY  2010    COLONOSCOPY N/A 10/4/2018    Repeat in 2028//Procedure: COLONOSCOPY;  COLONSCOPY with Polypectomy;  Surgeon: Jese Rizzo MD;  Location: [facility] OR    IR CONSULTATION FOR IR EXAM  1/10/2020    IR TRIGGER POINT INJ 1 OR 2 MUSCLES  1/29/2020    IR TRIGGER POINT INJ 1 OR 2 MUSCLES  9/21/2020    IR TRIGGER POINT INJ 1 OR 2 MUSCLES  2/24/2021    IR TRIGGER POINT INJ 1 OR 2 MUSCLES  11/24/2021       Current Outpatient Medications   Medication    alendronate (FOSAMAX) 35 MG tablet    PARoxetine (PAXIL) 10 MG tablet    EPINEPHrine (EPIPEN/ADRENACLICK/OR ANY BX GENERIC EQUIV) 0.3 MG/0.3ML injection 2-pack    ibuprofen (ADVIL/MOTRIN) 600 MG tablet    ibuprofen (ADVIL/MOTRIN) 600 MG tablet    tiZANidine (ZANAFLEX) 2 MG tablet     No current facility-administered medications for this visit.          Allergies   Allergen Reactions    Nuts Other (See Comments)     Nut flavor - throat tightens       Family History   Problem Relation Age of Onset    Cancer Sister 48        breast    Breast Cancer Paternal Grandmother 71       Social History     Socioeconomic History     Marital status:    Tobacco Use    Smoking status: Never    Smokeless tobacco: Never   Vaping Use    Vaping Use: Never used   Substance and Sexual Activity    Alcohol use: No    Drug use: No    Sexual activity: Yes     Partners: Male     Birth control/protection: I.U.D.   Other Topics Concern    Parent/sibling w/ CABG, MI or angioplasty before 65F 55M? No     Social Determinants of Health     Financial Resource Strain: Low Risk  (10/12/2023)    Financial Resource Strain     Within the past 12 months, have you or your family members you live with been unable to get utilities (heat, electricity) when it was really needed?: No   Food Insecurity: Low Risk  (10/12/2023)    Food Insecurity     Within the past 12 months, did you worry that your food would run out before you got money to buy more?: No     Within the past 12 months, did the food you bought just not last and you didn t have money to get more?: No   Transportation Needs: Low Risk  (10/12/2023)    Transportation Needs     Within the past 12 months, has lack of transportation kept you from medical appointments, getting your medicines, non-medical meetings or appointments, work, or from getting things that you need?: No   Interpersonal Safety: Low Risk  (10/12/2023)    Interpersonal Safety     Do you feel physically and emotionally safe where you currently live?: Yes     Within the past 12 months, have you been hit, slapped, kicked or otherwise physically hurt by someone?: No     Within the past 12 months, have you been humiliated or emotionally abused in other ways by your partner or ex-partner?: No   Housing Stability: Low Risk  (10/12/2023)    Housing Stability     Do you have housing? : Yes     Are you worried about losing your housing?: No       ROS: 10 point ROS neg other than the symptoms noted above in the HPI.  EXAM  Constitutional: healthy, alert, and no distress    Psychiatric: mentation appears normal and affect  normal/bright    VASCULAR:  -Dorsalis pedis pulse +2/4 b/l  -Posterior tibial pulse +2/4 b/l  -Capillary refill time < 3 seconds to b/l hallux  NEURO:  -Light touch sensation intact to b/l plantar forefoot  DERM:  -Skin temperature, texture and turgor WNL b/l  -RIGHT hallux toenail has moderate dystrophy and thickening with mild discoloration (mild yellow tint)  MSK:  -Pain on palpation to the RIGHT plantar 2nd metatarsal head and the plantar 2nd MTPJ  -Mild Pain on palpation to the RIGHT dorsal 1st MTPJ  -Muscle strength of ankles +5/5 for dorsiflexion, plantarflexion, ABDUction and ADDuction b/l      RIGHT FOOT RADIOGRAPHS 11/13/2023  IMPRESSION: No definite fracture on the current study.  DARIO JONES MD   ============================================================    ASSESSMENT:  (S90.211A) Traumatic subungual ecchymosis of right great toe  (primary encounter diagnosis)    (M77.41) Metatarsalgia of right foot        PLAN:  -Patient evaluated and examined. Treatment options discussed with no educational barriers noted.    -Reviewed RIGHT foot radiographs from 11/13/2023 -- there is an elongated second metatarsal head and DJD of the 1st MTPJ.  ------------------------------------------------    Metatarsalgia and hallux rigidus pain:  -Discussed potential causes and treatment options for metatarsalgia. Pain along the metatarsals can be caused from a number of factors, but is commonly caused by an imbalance of the biomechanics. This can include but is not limited to a flat foot, high arched foot, tendon imbalance, gastrocnemius equinus, and compensation for pain in other areas of the foot. In this case, patient has an elongated second metatarsal in comparison to the first metatarsal head. This is partially due to the reduction in the joint space at the 1st MTPJ as well as a history of having had a bunion surgery. She also has moderate DJD of the 1st MTPJ.   ---Conservative treatment options consist of wider  shoe gear and orthotics +/- padding/splinting to correct the biomechanics of the foot. Consistently wearing supportive shoe gear can also decrease this pain (stability shoe gear involves wearing a stability shoe that bends at the toe, but has a solid midfoot shank and heel contour).  She received CMOs through the orthotist, Iram Salinas in January, 2024. She is advised to slowly increase her time in the orthotics until she can comfortable wear them all day. If her pain does not improve, then she is advised to call Iram to have modifications made to the orthotics.     ---Surgical treatment options may be considered if conservative treatment does not improve the pain. Surgery should only be considered if the pain is limiting every day activities of the patient. Surgery may include a Weil osteotomy of the second and third metatarsal and a 1st MTPJ fusion. Discussed post-op recovery time as well as risks and benefits of the procedure(s). Patient would like to try conservative treatment options before proceeding with surgical treatment options.    -Stability Shoe Gear: This involves wearing a solid tennis shoe that bends at the toe, but has a solid midfoot portion of the shoe that does not bend or twist in half, and a rigid heel contour.   -----Sumner, Asics, and New Balance are a few brands that have several types of stability tennis shoes. However, these brands also carry lightweight shoes that do not meet the above criteria, so look for a stability tennis shoe.  -----Sumner tend to have a wider toe box if you have difficulty finding wide enough shoes for your feet.   -----Any brand of can be worn as long as it meets the above three criteria.  ---Orthotics: Call the orthotist, Iram Salinas and schedule an appointment for modifications if the RIGHT foot pain is not improving.    -Icing: Ice the painful area of the foot minimally once a day for ten minutes per foot (can be a frozen water bottle if pain is on the bottom  surface of the foot). Ice after any extended amount of time on your feet.  -Consider supportive sandals for around the house (such as Newport, Vionics, Birkenstocks, Keens, Merrells, or Oofos sandals)  ---Look for a silicone toe sleeve (size large) for the LEFT great toe    Total time spent preparing to see the patient, review of chart, obtaining history and physical examination, review of x-rays,treatment options, education, discussion with patient and documenting in Epic / EMR was 30 minutes. All time involved was spent on the day of service for the patient (the same day as the patient's appointment).    -Patient in agreement with the above treatment plan and all of patient's questions were answered.      Return to clinic two months to evaluate RIGHT plantar second metatarsal head pain -- earlier if hallux causes pain or discomfort        Erica Franz DPM

## 2024-02-13 NOTE — PATIENT INSTRUCTIONS
-Stability Shoe Gear: This involves wearing a solid tennis shoe that bends at the toe, but has a solid midfoot portion of the shoe that does not bend or twist in half, and a rigid heel contour.   -----Sumner, Asics, and New Balance are a few brands that have several types of stability tennis shoes. However, these brands also carry lightweight shoes that do not meet the above criteria, so look for a stability tennis shoe.  -----Sumner tend to have a wider toe box if you have difficulty finding wide enough shoes for your feet.   -----Any brand of can be worn as long as it meets the above three criteria.  ---Orthotics: Call the orthotist, Iram Salinas and schedule an appointment for modifications if the RIGHT foot pain is not improving.    -Icing: Ice the painful area of the foot minimally once a day for ten minutes per foot (can be a frozen water bottle if pain is on the bottom surface of the foot). Ice after any extended amount of time on your feet.  -Consider supportive sandals for around the house (such as [place], Vionics, Birkenstocks, Keens, Merrells, or Oofos sandals)  ---Look for a silicone toe sleeve (size large) for the LEFT great toe

## 2024-03-13 ENCOUNTER — OFFICE VISIT (OUTPATIENT)
Dept: FAMILY MEDICINE | Facility: OTHER | Age: 54
End: 2024-03-13
Attending: NURSE PRACTITIONER
Payer: COMMERCIAL

## 2024-03-13 VITALS
SYSTOLIC BLOOD PRESSURE: 126 MMHG | WEIGHT: 119.5 LBS | DIASTOLIC BLOOD PRESSURE: 82 MMHG | HEIGHT: 62 IN | TEMPERATURE: 98.7 F | RESPIRATION RATE: 16 BRPM | HEART RATE: 81 BPM | BODY MASS INDEX: 21.99 KG/M2 | OXYGEN SATURATION: 98 %

## 2024-03-13 DIAGNOSIS — Z91.018 NUT ALLERGY: ICD-10-CM

## 2024-03-13 DIAGNOSIS — Z80.3 FAMILY HISTORY OF BREAST CANCER IN SISTER: ICD-10-CM

## 2024-03-13 DIAGNOSIS — M85.89 OSTEOPENIA OF MULTIPLE SITES: ICD-10-CM

## 2024-03-13 DIAGNOSIS — N64.4 BREAST PAIN, LEFT: Primary | ICD-10-CM

## 2024-03-13 PROCEDURE — 99213 OFFICE O/P EST LOW 20 MIN: CPT | Performed by: NURSE PRACTITIONER

## 2024-03-13 RX ORDER — EPINEPHRINE 0.3 MG/.3ML
INJECTION SUBCUTANEOUS
Qty: 2 EACH | Refills: 3 | Status: SHIPPED | OUTPATIENT
Start: 2024-03-13

## 2024-03-13 RX ORDER — ALENDRONATE SODIUM 35 MG/1
35 TABLET ORAL
Qty: 12 TABLET | Refills: 3 | Status: SHIPPED | OUTPATIENT
Start: 2024-03-13

## 2024-03-13 ASSESSMENT — PAIN SCALES - GENERAL: PAINLEVEL: MILD PAIN (3)

## 2024-03-13 NOTE — PROGRESS NOTES
Assessment & Plan     Osteopenia of multiple sites  Refilled   Dexa was completed in 2023 not due yet   - alendronate (FOSAMAX) 35 MG tablet; Take 1 tablet (35 mg) by mouth every 7 days    Nut allergy  Refilled   - EPINEPHrine (ANY BX GENERIC EQUIV) 0.3 MG/0.3ML injection 2-pack; INJECT 0.3 ML (0.3 MG) INTO THE MUSCLE AS NEEDED FOR ANAPHYLAXIS    Breast pain, left  Family history of breast cancer in sister  Left lateral breast more fibrous.  With family history and recent change with discomfort to left lateral breast plan for mammogram and US   - MA Diagnostic Digital Bilateral  - US Breast Left Limited 1-3 Quadrants; Future    Ordering of each unique test          See Patient Instructions    No follow-ups on file.    Benito Woodruff is a 54 year old, presenting for the following health issues:  Breast Pain        3/13/2024     1:23 PM   Additional Questions   Roomed by Luis Fernando Kellogg LPN   Accompanied by Self         3/13/2024     1:23 PM   Patient Reported Additional Medications   Patient reports taking the following new medications None     HPI     Breast Concern  Onset/Duration: 01/31/2024  Description:   Location: Left side, outer and lower area  Pain or tenderness: YES, ongoing pain 4-6 weeks   Redness: No  Intensity: mild to moderate  Progression of Symptoms: same and constant  Accompanying Signs & Symptoms:  Any lumps in axillary region: No  Movable: N/A  Nipple discharge: None  Changes in the skin or nipple: None  On Hormone therapy: No  Does it change with menstrual cycle: N/A  Previous history of similar problem: No  First degree relative with breast cancer: a positive family history for breast cancer in her sister.  Precipitating factors:           Worsened by: Activity, Tighter Bras  Alleviating factors:            Improved by: Heat helps  Therapies tried and outcome: None  No LMP recorded. (Menstrual status: Irregular Periods).  Denies any weight gain or change in work out routine       Review of  "Systems  CONSTITUTIONAL: NEGATIVE for fever, chills, change in weight  ENT/MOUTH: NEGATIVE for ear, mouth and throat problems  RESP: NEGATIVE for significant cough or SOB  BREAST: left breast discomfort from axilla to the lateral left breast to midline.  No specific tender spot and denies any lumps, bumps or masses   CV: NEGATIVE for chest pain, palpitations or peripheral edema      Objective    /82   Pulse 81   Temp 98.7  F (37.1  C) (Tympanic)   Resp 16   Ht 1.575 m (5' 2\")   Wt 54.2 kg (119 lb 8 oz)   SpO2 98%   BMI 21.86 kg/m    Body mass index is 21.86 kg/m .  Physical Exam   GENERAL: alert and no distress  RESP: lungs clear to auscultation - no rales, rhonchi or wheezes  BREAST: Right breast:normal without masses, tenderness or nipple discharge, no palpable axillary masses or adenopathy, and Left breast mild swelling into axilla and fibrous changes noted to left lateral breast    CV: regular rate and rhythm, normal S1 S2, no S3 or S4, no murmur, click or rub, no peripheral edema      Mammogram and US pending         Signed Electronically by: DANIEL Leyva CNP    "

## 2024-03-20 ENCOUNTER — HOSPITAL ENCOUNTER (OUTPATIENT)
Dept: MAMMOGRAPHY | Facility: OTHER | Age: 54
Discharge: HOME | End: 2024-03-20
Attending: NURSE PRACTITIONER
Payer: COMMERCIAL

## 2024-03-20 ENCOUNTER — HOSPITAL ENCOUNTER (OUTPATIENT)
Dept: ULTRASOUND IMAGING | Facility: HOSPITAL | Age: 54
Discharge: HOME | End: 2024-03-20
Attending: NURSE PRACTITIONER
Payer: COMMERCIAL

## 2024-03-20 DIAGNOSIS — Z80.3 FAMILY HISTORY OF BREAST CANCER: ICD-10-CM

## 2024-03-20 DIAGNOSIS — N64.4 BREAST PAIN, LEFT: ICD-10-CM

## 2024-03-20 DIAGNOSIS — Z80.3 FAMILY HISTORY OF BREAST CANCER IN SISTER: ICD-10-CM

## 2024-03-20 PROCEDURE — 77066 DX MAMMO INCL CAD BI: CPT | Mod: TC | Performed by: RADIOLOGY

## 2024-03-20 PROCEDURE — 76642 ULTRASOUND BREAST LIMITED: CPT | Mod: LT

## 2024-03-20 PROCEDURE — G0279 TOMOSYNTHESIS, MAMMO: HCPCS | Mod: TC | Performed by: RADIOLOGY

## 2024-04-11 ENCOUNTER — OFFICE VISIT (OUTPATIENT)
Dept: PODIATRY | Facility: OTHER | Age: 54
End: 2024-04-11
Attending: PODIATRIST
Payer: COMMERCIAL

## 2024-04-11 VITALS
SYSTOLIC BLOOD PRESSURE: 133 MMHG | TEMPERATURE: 97.3 F | DIASTOLIC BLOOD PRESSURE: 86 MMHG | HEART RATE: 69 BPM | OXYGEN SATURATION: 99 %

## 2024-04-11 DIAGNOSIS — L60.3 ONYCHODYSTROPHY: ICD-10-CM

## 2024-04-11 DIAGNOSIS — M77.41 METATARSALGIA OF RIGHT FOOT: Primary | ICD-10-CM

## 2024-04-11 PROCEDURE — 99213 OFFICE O/P EST LOW 20 MIN: CPT | Performed by: PODIATRIST

## 2024-04-11 ASSESSMENT — PAIN SCALES - GENERAL: PAINLEVEL: MILD PAIN (3)

## 2024-04-11 NOTE — PROGRESS NOTES
Chief complaint: Patient presents with:  Musculoskeletal Problem: 2 month follow up foot pain      History of Present Illness: This 54 year old female is seen fr follow-up management of RIGHT second MTPJ pain. She has been wearing her orthotic more in her shoes around her house. She has not tried a sandal for summer. Her pain has improved since her last appointment. It took a transition to get used to the orthotics. The orthotics have also mildly increased the pain in her 1st MTPJ.    The RIGHT hallux medial toenail also started to cause some discomfort a week ago. She also had a toenail avulsion of the RIGHT hallux toenail on 11/13/2023.     No further pedal complaints today.         History of RIGHT foot pain: Patient kicked a soccer ball at the end of October, 2023, and the toe was instantly painful. The pain did not improve, so she came to the clinic two weeks ago. She was told she may have a toe fracture.  She was given a walking shoe that she wore at times. She wore tennis shoes at home and the toe felt better when wearing tennis shoes.    Additionally, the joint space space on the RIGHT second MTPJ had  been painful for a long time. She says this pain had been ongoing for over a year (since the fall of 2022) and the pain was sometimes deep and and on the dorsal foot. The pain had been getting worse and was painful when she was walking and when she was running.    Historically, patient had a bilateral bunion surgery.         /86 (BP Location: Left arm, Patient Position: Sitting, Cuff Size: Adult Regular)   Pulse 69   Temp 97.3  F (36.3  C) (Tympanic)   SpO2 99%     Patient Active Problem List   Diagnosis    Annual physical exam    History of abnormal Pap smear    Family planning    Dysthymia    Family history of breast cancer in sister    Depression with anxiety    High risk HPV infection    Breast cancer screening, high risk patient    Nut allergy    Stenosis, cervix    Chondromalacia of patella,  left    Chondromalacia of patella, right    Subluxation of left patella, initial encounter    Subluxation of right patella, initial encounter    Carcinoma in situ of cervix uteri    Perimenopausal    Osteopenia of multiple sites       Past Surgical History:   Procedure Laterality Date    ------------OTHER-------------  2009    LEEP    ------------OTHER-------------  2005    IUD    ARTHROSCOPY SHLDR ROTATOR CUFF REPAIR, SUBACR DECOMP, DIST CLAVCL RESECTION, BICEP TENODESIS REPAIR Right 04/10/2023    Procedure: Arthroscopy Shoulder Rotator Cuff Evaulation, Subacromial  Decompression, Distal Clavcicle Excision, & Bicep Tenodesis Repair;  Surgeon: Ye Russo MD;  Location: [facility] OR    BIOPSY BREAST Left 2012    breast lump/benign    BUNIONECTOMY  2010    COLONOSCOPY N/A 10/04/2018    Repeat in 2028//Procedure: COLONOSCOPY;  COLONSCOPY with Polypectomy;  Surgeon: Jese Rizzo MD;  Location: [facility] OR    IR CONSULTATION FOR IR EXAM  01/10/2020    IR TRIGGER POINT INJ 1 OR 2 MUSCLES  01/29/2020    IR TRIGGER POINT INJ 1 OR 2 MUSCLES  09/21/2020    IR TRIGGER POINT INJ 1 OR 2 MUSCLES  02/24/2021    IR TRIGGER POINT INJ 1 OR 2 MUSCLES  11/24/2021       Current Outpatient Medications   Medication Sig Dispense Refill    alendronate (FOSAMAX) 35 MG tablet Take 1 tablet (35 mg) by mouth every 7 days 12 tablet 3    EPINEPHrine (ANY BX GENERIC EQUIV) 0.3 MG/0.3ML injection 2-pack INJECT 0.3 ML (0.3 MG) INTO THE MUSCLE AS NEEDED FOR ANAPHYLAXIS 2 each 3    PARoxetine (PAXIL) 10 MG tablet Take 1 tablet (10 mg) by mouth daily 90 tablet 3    ibuprofen (ADVIL/MOTRIN) 600 MG tablet Take 1 tablet (600 mg) by mouth every 6 hours as needed for other (mild and/or inflammatory pain) (Patient not taking: Reported on 12/7/2023) 30 tablet 0    ibuprofen (ADVIL/MOTRIN) 600 MG tablet  (Patient not taking: Reported on 12/7/2023)      tiZANidine (ZANAFLEX) 2 MG tablet 1-2 tabs orally three times a day (Patient not taking: Reported on 2/13/2024)  40 tablet 0     No current facility-administered medications for this visit.          Allergies   Allergen Reactions    Nuts Other (See Comments)     Nut flavor - throat tightens       Family History   Problem Relation Age of Onset    Breast Cancer Sister 45        1 breast    Cancer Sister 48        breast    Breast Cancer Paternal Grandmother 71        1 breast       Social History     Socioeconomic History    Marital status:    Tobacco Use    Smoking status: Never    Smokeless tobacco: Never   Vaping Use    Vaping Use: Never used   Substance and Sexual Activity    Alcohol use: No    Drug use: No    Sexual activity: Yes     Partners: Male     Birth control/protection: I.U.D.   Other Topics Concern    Parent/sibling w/ CABG, MI or angioplasty before 65F 55M? No     Social Determinants of Health     Financial Resource Strain: Low Risk  (10/12/2023)    Financial Resource Strain     Within the past 12 months, have you or your family members you live with been unable to get utilities (heat, electricity) when it was really needed?: No   Food Insecurity: Low Risk  (10/12/2023)    Food Insecurity     Within the past 12 months, did you worry that your food would run out before you got money to buy more?: No     Within the past 12 months, did the food you bought just not last and you didn t have money to get more?: No   Transportation Needs: Low Risk  (10/12/2023)    Transportation Needs     Within the past 12 months, has lack of transportation kept you from medical appointments, getting your medicines, non-medical meetings or appointments, work, or from getting things that you need?: No   Interpersonal Safety: Low Risk  (10/12/2023)    Interpersonal Safety     Do you feel physically and emotionally safe where you currently live?: Yes     Within the past 12 months, have you been hit, slapped, kicked or otherwise physically hurt by someone?: No     Within the past 12 months, have you been humiliated or emotionally  abused in other ways by your partner or ex-partner?: No   Housing Stability: Low Risk  (10/12/2023)    Housing Stability     Do you have housing? : Yes     Are you worried about losing your housing?: No       ROS: 10 point ROS neg other than the symptoms noted above in the HPI.  EXAM  Constitutional: healthy, alert, and no distress    Psychiatric: mentation appears normal and affect normal/bright    VASCULAR:  -Dorsalis pedis pulse +2/4 b/l  -Posterior tibial pulse +2/4 b/l  -Capillary refill time < 3 seconds to b/l hallux  NEURO:  -Light touch sensation intact to b/l plantar forefoot  DERM:  -Skin temperature, texture and turgor WNL b/l  -RIGHT hallux toenail has increased discoloration, dystrophy and thickening with mild discoloration (mild yellow tint)  ---Increased incurvation of the distal lateral toenail border and mild incurvation to the entire distal toenail  ---No open wounds and no drainage  ---No severe erythema, no ascending erythema, no calor, no purulence, no malodor, no other signs of infection.   MSK:  -Mild tenderness on palpation to the RIGHT plantar 2nd metatarsal head and the plantar 2nd MTPJ  -Mild tenderness on palpation to the RIGHT dorsal 1st MTPJ  -Muscle strength of ankles +5/5 for dorsiflexion, plantarflexion, ABDUction and ADDuction b/l      RIGHT FOOT RADIOGRAPHS 11/13/2023  IMPRESSION: No definite fracture on the current study.  DARIO JONES MD   ============================================================    ASSESSMENT:  (M77.41) Metatarsalgia of right foot  (primary encounter diagnosis)    (L60.3) Onychodystrophy        PLAN:  -Patient evaluated and examined. Treatment options discussed with no educational barriers noted.    -Reviewed RIGHT foot radiographs from 11/13/2023 -- there is an elongated second metatarsal head and DJD of the 1st MTPJ.  ------------------------------------------------    Metatarsalgia and hallux rigidus pain:  -Discussed potential causes and treatment  options for metatarsalgia. She may have her orthotics adjusted. Also look for supportive sandals such as an [employer] sandal that has cushion for when she is not wearing her orthotics. The orthotics can also be modified to decrease the 1st MTPJ pain. If 2nd MTPJ pain persists, her 2nd metatarsal head is 10mm longer than her 1st metatarsal head which is also contributing to her pain. She  may consider a Weil osteotomy +/- plantar plate repair if there is injury to the plantar plate. This would be a six week recovery without walking but would correct part of the deformity causing the pain.    Ingrown toenail(s):  -Discussed nail procedure options and etiologies and treatments for ingrown toenails. Conservatively, patient could opt for a slant back today and keep monitoring the toe since there are no SOI. Discussed risks and benefits and healing course of a nail border avulsion vs. Matrixectomy including post procedure infection or a non-healing wound, both of which could lead to a life threatening infection or amputation of the foot or leg or a proximal amputation. She tried a toenail avulsion and the nail is now incurvating distally. She may try a foot soak and gently lifting the distal lateral toenail border and wedging cotton beneath the nail border if it is uncomfortable. She may also apply an antibiotic ointment and a bandaid on the toenail border to reduce the pressure and pain. She would like to try this first because she does not want a toenail procedure today. There are no open wounds or signs of infection, so she will call if this pain increases. Call if she is concerned about an infection.    -This is an acute, uncomplicated illness/injury with OTC treatment options reviewed.    -Patient in agreement with the above treatment plan and all of patient's questions were answered.      Return to clinic as needed if RIGHT hallux toenail or RIGHT foot pain increases or fails to improve        Erica Franz DPM

## 2024-04-21 DIAGNOSIS — F41.0 PANIC DISORDER WITHOUT AGORAPHOBIA: ICD-10-CM

## 2024-04-22 RX ORDER — PAROXETINE 10 MG/1
10 TABLET, FILM COATED ORAL DAILY
Qty: 90 TABLET | Refills: 3 | Status: SHIPPED | OUTPATIENT
Start: 2024-04-22

## 2024-04-22 NOTE — TELEPHONE ENCOUNTER
Paxil      Last Written Prescription Date:  5/1/23  Last Fill Quantity: 90,   # refills: 3  Last Office Visit: 3/13/24  Future Office visit:       Routing refill request to provider for review/approval because:

## 2024-07-08 ENCOUNTER — MYC MEDICAL ADVICE (OUTPATIENT)
Dept: FAMILY MEDICINE | Facility: OTHER | Age: 54
End: 2024-07-08

## 2024-07-08 DIAGNOSIS — G89.29 CHRONIC SHOULDER PAIN, UNSPECIFIED LATERALITY: Primary | ICD-10-CM

## 2024-07-08 DIAGNOSIS — M25.519 CHRONIC SHOULDER PAIN, UNSPECIFIED LATERALITY: Primary | ICD-10-CM

## 2024-07-10 ENCOUNTER — TRANSFERRED RECORDS (OUTPATIENT)
Dept: HEALTH INFORMATION MANAGEMENT | Facility: CLINIC | Age: 54
End: 2024-07-10

## 2024-12-22 ENCOUNTER — HEALTH MAINTENANCE LETTER (OUTPATIENT)
Age: 54
End: 2024-12-22

## 2025-04-10 ENCOUNTER — TELEPHONE (OUTPATIENT)
Dept: MAMMOGRAPHY | Facility: OTHER | Age: 55
End: 2025-04-10

## 2025-04-10 ENCOUNTER — ANCILLARY PROCEDURE (OUTPATIENT)
Dept: MAMMOGRAPHY | Facility: OTHER | Age: 55
End: 2025-04-10
Payer: COMMERCIAL

## 2025-04-10 DIAGNOSIS — Z12.31 VISIT FOR SCREENING MAMMOGRAM: ICD-10-CM

## 2025-04-10 PROCEDURE — 77067 SCR MAMMO BI INCL CAD: CPT | Performed by: RADIOLOGY

## 2025-04-10 PROCEDURE — 77063 BREAST TOMOSYNTHESIS BI: CPT | Performed by: RADIOLOGY

## 2025-04-11 ENCOUNTER — LAB (OUTPATIENT)
Dept: LAB | Facility: OTHER | Age: 55
End: 2025-04-11
Attending: FAMILY MEDICINE
Payer: COMMERCIAL

## 2025-04-11 DIAGNOSIS — E55.9 HYPOVITAMINOSIS D: ICD-10-CM

## 2025-04-11 DIAGNOSIS — Z13.220 SCREENING FOR HYPERLIPIDEMIA: ICD-10-CM

## 2025-04-11 DIAGNOSIS — Z11.59 ENCOUNTER FOR SCREENING FOR OTHER VIRAL DISEASES: ICD-10-CM

## 2025-04-11 DIAGNOSIS — M85.89 OSTEOPENIA OF MULTIPLE SITES: ICD-10-CM

## 2025-04-11 PROBLEM — Z12.4 CERVICAL CANCER SCREENING: Status: ACTIVE | Noted: 2025-04-11

## 2025-04-11 PROBLEM — N89.8 VAGINAL DISCHARGE: Status: ACTIVE | Noted: 2025-04-11

## 2025-04-11 PROBLEM — Z23 NEED FOR VACCINATION: Status: ACTIVE | Noted: 2025-04-11

## 2025-04-11 PROBLEM — F41.0 PANIC DISORDER WITHOUT AGORAPHOBIA: Status: ACTIVE | Noted: 2025-04-11

## 2025-04-11 PROBLEM — R79.89 LOW VITAMIN B12 LEVEL: Status: ACTIVE | Noted: 2025-04-11

## 2025-04-11 PROBLEM — N95.2 VAGINAL ATROPHY: Status: ACTIVE | Noted: 2025-04-11

## 2025-04-11 LAB
ALBUMIN SERPL BCG-MCNC: 4.6 G/DL (ref 3.5–5.2)
ALP SERPL-CCNC: 70 U/L (ref 40–150)
ALT SERPL W P-5'-P-CCNC: 13 U/L (ref 0–50)
ANION GAP SERPL CALCULATED.3IONS-SCNC: 8 MMOL/L (ref 7–15)
AST SERPL W P-5'-P-CCNC: 25 U/L (ref 0–45)
BILIRUB SERPL-MCNC: 0.2 MG/DL
BUN SERPL-MCNC: 17.3 MG/DL (ref 6–20)
CALCIUM SERPL-MCNC: 9.4 MG/DL (ref 8.8–10.4)
CHLORIDE SERPL-SCNC: 104 MMOL/L (ref 98–107)
CHOLEST SERPL-MCNC: 188 MG/DL
CREAT SERPL-MCNC: 0.77 MG/DL (ref 0.51–0.95)
EGFRCR SERPLBLD CKD-EPI 2021: 89 ML/MIN/1.73M2
FASTING STATUS PATIENT QL REPORTED: NO
FASTING STATUS PATIENT QL REPORTED: NO
GLUCOSE SERPL-MCNC: 80 MG/DL (ref 70–99)
HCO3 SERPL-SCNC: 29 MMOL/L (ref 22–29)
HCV AB SERPL QL IA: NONREACTIVE
HDLC SERPL-MCNC: 83 MG/DL
HIV 1+2 AB+HIV1 P24 AG SERPL QL IA: NONREACTIVE
LDLC SERPL CALC-MCNC: 95 MG/DL
NONHDLC SERPL-MCNC: 105 MG/DL
POTASSIUM SERPL-SCNC: 4.4 MMOL/L (ref 3.4–5.3)
PROT SERPL-MCNC: 7.3 G/DL (ref 6.4–8.3)
SODIUM SERPL-SCNC: 141 MMOL/L (ref 135–145)
TRIGL SERPL-MCNC: 51 MG/DL
VIT B12 SERPL-MCNC: 1340 PG/ML (ref 232–1245)
VIT D+METAB SERPL-MCNC: 55 NG/ML (ref 20–50)

## 2025-04-11 PROCEDURE — 36415 COLL VENOUS BLD VENIPUNCTURE: CPT

## 2025-04-11 PROCEDURE — 82306 VITAMIN D 25 HYDROXY: CPT

## 2025-04-11 PROCEDURE — 82607 VITAMIN B-12: CPT

## 2025-04-11 PROCEDURE — 80061 LIPID PANEL: CPT

## 2025-04-11 PROCEDURE — 86803 HEPATITIS C AB TEST: CPT

## 2025-04-11 PROCEDURE — 80053 COMPREHEN METABOLIC PANEL: CPT

## 2025-04-11 PROCEDURE — 87389 HIV-1 AG W/HIV-1&-2 AB AG IA: CPT

## 2025-05-07 ENCOUNTER — RESULTS FOLLOW-UP (OUTPATIENT)
Dept: FAMILY MEDICINE | Facility: OTHER | Age: 55
End: 2025-05-07

## 2025-05-07 ENCOUNTER — HOSPITAL ENCOUNTER (OUTPATIENT)
Dept: BONE DENSITY | Facility: HOSPITAL | Age: 55
Discharge: HOME | End: 2025-05-07
Attending: FAMILY MEDICINE
Payer: COMMERCIAL

## 2025-05-07 DIAGNOSIS — M85.89 OSTEOPENIA OF MULTIPLE SITES: ICD-10-CM

## 2025-05-07 PROCEDURE — 77080 DXA BONE DENSITY AXIAL: CPT

## 2025-05-07 PROCEDURE — 77080 DXA BONE DENSITY AXIAL: CPT | Mod: 26 | Performed by: RADIOLOGY

## (undated) DEVICE — DRAPE U-SHAPED ORTHOARTS 60X70" 89331

## (undated) DEVICE — GLOVE PROTEXIS POWDER FREE SMT 8.5 2D72PT85X

## (undated) DEVICE — IRRIGATION-H2O 1000ML

## (undated) DEVICE — ABLATOR ARTHREX APOLLO RF MP90 ASPIRATING 90DEG AR-9811

## (undated) DEVICE — TUBING ARTHROSCOPY PUMP ARTHREX AR-6410

## (undated) DEVICE — PACK SHOULDER ARTHROSCOPY SOP15SAFCA

## (undated) DEVICE — ARTHROSCOPIC CANNULA TWIST-IN PURPLE 7MMX7CM AR-6570

## (undated) DEVICE — CONNECTOR-ERBEFLO 2 PORT

## (undated) DEVICE — BUR ARTHREX COOLCUT OVAL 8 FLUTE 4.0MMX13CM AR-8400OBE

## (undated) DEVICE — GLOVE BIOGEL PI SZ 8.5 40885

## (undated) DEVICE — BUR ARTHREX COOLCUT DISSECTOR 4.0MMX13CM AR-8400DS

## (undated) DEVICE — DRSG GAUZE 4X4" TRAY 6939

## (undated) DEVICE — DRAPE STERI U 1015

## (undated) DEVICE — FORCEP-COLON BIOPSY LARGE W/NEEDLE 240CM

## (undated) DEVICE — TUBING-SUCTION 20FT

## (undated) DEVICE — SENSOR-OXISENSOR II ADULT

## (undated) DEVICE — SU ETHILON 3-0 PS-2 18" 1669H

## (undated) DEVICE — KIT SHOULDER POSITIONING BEACH CHAIR ARM HOLDER AR-1644

## (undated) DEVICE — SOL WATER 1500ML

## (undated) DEVICE — PREP CHLORAPREP 26ML TINTED ORANGE  260815

## (undated) DEVICE — TUBING SET ARTHREX DUALWAVE OUTFLOW AR-6430

## (undated) DEVICE — DRAPE STERI TOWEL LG 1010

## (undated) DEVICE — DRAPE ARTHROSCOPY SHOULDER BEACHCHAIR 29369

## (undated) DEVICE — SUCTION MANIFOLD NEPTUNE 2 SYS 4 PORT 0702-020-000

## (undated) DEVICE — SLEEVE COMPRESSION SCD KNEE MED 74022

## (undated) DEVICE — NDL SPINAL 18GA 3.5" 405184

## (undated) DEVICE — DRSG ABDOMINAL PAD UNSTERILE 5X9" 9190

## (undated) DEVICE — CANISTER-SUCTION 2000CC

## (undated) DEVICE — SU FIBERWIRE #2 FIBERSTICK 50"  AR-7209

## (undated) RX ORDER — EPHEDRINE SULFATE 50 MG/ML
INJECTION, SOLUTION INTRAMUSCULAR; INTRAVENOUS; SUBCUTANEOUS
Status: DISPENSED
Start: 2023-04-10

## (undated) RX ORDER — HYDROCODONE BITARTRATE AND ACETAMINOPHEN 5; 325 MG/1; MG/1
TABLET ORAL
Status: DISPENSED
Start: 2023-04-10

## (undated) RX ORDER — FENTANYL CITRATE-0.9 % NACL/PF 10 MCG/ML
PLASTIC BAG, INJECTION (ML) INTRAVENOUS
Status: DISPENSED
Start: 2023-04-10

## (undated) RX ORDER — PROPOFOL 10 MG/ML
INJECTION, EMULSION INTRAVENOUS
Status: DISPENSED
Start: 2018-10-04

## (undated) RX ORDER — BUPIVACAINE HYDROCHLORIDE 5 MG/ML
INJECTION, SOLUTION EPIDURAL; INTRACAUDAL
Status: DISPENSED
Start: 2023-04-10

## (undated) RX ORDER — EPINEPHRINE 1 MG/ML
INJECTION INTRAMUSCULAR; INTRAVENOUS; SUBCUTANEOUS
Status: DISPENSED
Start: 2023-04-10

## (undated) RX ORDER — LIDOCAINE HYDROCHLORIDE 20 MG/ML
INJECTION, SOLUTION EPIDURAL; INFILTRATION; INTRACAUDAL; PERINEURAL
Status: DISPENSED
Start: 2018-10-04

## (undated) RX ORDER — PROPOFOL 10 MG/ML
INJECTION, EMULSION INTRAVENOUS
Status: DISPENSED
Start: 2023-04-10

## (undated) RX ORDER — DEXAMETHASONE SODIUM PHOSPHATE 10 MG/ML
INJECTION, SOLUTION INTRAMUSCULAR; INTRAVENOUS
Status: DISPENSED
Start: 2023-04-10

## (undated) RX ORDER — KETOROLAC TROMETHAMINE 30 MG/ML
INJECTION, SOLUTION INTRAMUSCULAR; INTRAVENOUS
Status: DISPENSED
Start: 2023-04-10

## (undated) RX ORDER — CEFAZOLIN SODIUM/WATER 2 G/20 ML
SYRINGE (ML) INTRAVENOUS
Status: DISPENSED
Start: 2023-04-10

## (undated) RX ORDER — DEXAMETHASONE SODIUM PHOSPHATE 4 MG/ML
INJECTION, SOLUTION INTRA-ARTICULAR; INTRALESIONAL; INTRAMUSCULAR; INTRAVENOUS; SOFT TISSUE
Status: DISPENSED
Start: 2023-04-10

## (undated) RX ORDER — FENTANYL CITRATE 50 UG/ML
INJECTION, SOLUTION INTRAMUSCULAR; INTRAVENOUS
Status: DISPENSED
Start: 2023-04-10

## (undated) RX ORDER — ONDANSETRON 2 MG/ML
INJECTION INTRAMUSCULAR; INTRAVENOUS
Status: DISPENSED
Start: 2023-04-10